# Patient Record
Sex: MALE | Race: WHITE
[De-identification: names, ages, dates, MRNs, and addresses within clinical notes are randomized per-mention and may not be internally consistent; named-entity substitution may affect disease eponyms.]

---

## 2018-10-30 ENCOUNTER — RECORD ABSTRACTING (OUTPATIENT)
Age: 68
End: 2018-10-30

## 2018-10-30 DIAGNOSIS — Z83.3 FAMILY HISTORY OF DIABETES MELLITUS: ICD-10-CM

## 2018-10-30 DIAGNOSIS — Z78.9 OTHER SPECIFIED HEALTH STATUS: ICD-10-CM

## 2018-10-30 DIAGNOSIS — Z87.19 PERSONAL HISTORY OF OTHER DISEASES OF THE DIGESTIVE SYSTEM: ICD-10-CM

## 2018-10-30 DIAGNOSIS — Z80.42 FAMILY HISTORY OF MALIGNANT NEOPLASM OF PROSTATE: ICD-10-CM

## 2018-10-30 DIAGNOSIS — Z82.3 FAMILY HISTORY OF STROKE: ICD-10-CM

## 2018-10-30 DIAGNOSIS — Z82.49 FAMILY HISTORY OF ISCHEMIC HEART DISEASE AND OTHER DISEASES OF THE CIRCULATORY SYSTEM: ICD-10-CM

## 2018-12-03 ENCOUNTER — RX RENEWAL (OUTPATIENT)
Age: 68
End: 2018-12-03

## 2018-12-03 ENCOUNTER — APPOINTMENT (OUTPATIENT)
Dept: INTERNAL MEDICINE | Facility: CLINIC | Age: 68
End: 2018-12-03
Payer: COMMERCIAL

## 2018-12-03 ENCOUNTER — APPOINTMENT (OUTPATIENT)
Dept: ENDOCRINOLOGY | Facility: CLINIC | Age: 68
End: 2018-12-03
Payer: COMMERCIAL

## 2018-12-03 VITALS
BODY MASS INDEX: 25.49 KG/M2 | DIASTOLIC BLOOD PRESSURE: 80 MMHG | HEIGHT: 61 IN | SYSTOLIC BLOOD PRESSURE: 140 MMHG | WEIGHT: 135 LBS | HEART RATE: 68 BPM

## 2018-12-03 VITALS
WEIGHT: 135 LBS | HEIGHT: 61 IN | SYSTOLIC BLOOD PRESSURE: 140 MMHG | BODY MASS INDEX: 25.49 KG/M2 | DIASTOLIC BLOOD PRESSURE: 80 MMHG

## 2018-12-03 DIAGNOSIS — Z00.00 ENCOUNTER FOR GENERAL ADULT MEDICAL EXAMINATION W/OUT ABNORMAL FINDINGS: ICD-10-CM

## 2018-12-03 DIAGNOSIS — Z86.39 PERSONAL HISTORY OF OTHER ENDOCRINE, NUTRITIONAL AND METABOLIC DISEASE: ICD-10-CM

## 2018-12-03 LAB
ALBUMIN SERPL ELPH-MCNC: 4.5 G/DL
ALP BLD-CCNC: 94 U/L
ALT SERPL-CCNC: 20 U/L
ANION GAP SERPL CALC-SCNC: 14 MMOL/L
AST SERPL-CCNC: 23 U/L
BILIRUB SERPL-MCNC: 0.3 MG/DL
BUN SERPL-MCNC: 22 MG/DL
CALCIUM SERPL-MCNC: 9.4 MG/DL
CHLORIDE SERPL-SCNC: 106 MMOL/L
CHOLEST SERPL-MCNC: 148 MG/DL
CHOLEST/HDLC SERPL: 3.7 RATIO
CO2 SERPL-SCNC: 26 MMOL/L
CREAT SERPL-MCNC: 0.89 MG/DL
GLUCOSE SERPL-MCNC: 117 MG/DL
HBA1C MFR BLD HPLC: 6 %
HDLC SERPL-MCNC: 40 MG/DL
LDLC SERPL CALC-MCNC: 88 MG/DL
POTASSIUM SERPL-SCNC: 4.5 MMOL/L
PROT SERPL-MCNC: 6.9 G/DL
SODIUM SERPL-SCNC: 146 MMOL/L
TRIGL SERPL-MCNC: 102 MG/DL

## 2018-12-03 PROCEDURE — G0009: CPT

## 2018-12-03 PROCEDURE — 36415 COLL VENOUS BLD VENIPUNCTURE: CPT

## 2018-12-03 PROCEDURE — 90670 PCV13 VACCINE IM: CPT

## 2018-12-03 PROCEDURE — 99213 OFFICE O/P EST LOW 20 MIN: CPT

## 2018-12-03 PROCEDURE — 99387 INIT PM E/M NEW PAT 65+ YRS: CPT | Mod: 25

## 2018-12-03 RX ORDER — PNV NO.95/FERROUS FUM/FOLIC AC 28MG-0.8MG
TABLET ORAL
Refills: 0 | Status: ACTIVE | COMMUNITY

## 2018-12-03 NOTE — HEALTH RISK ASSESSMENT
[Good] : ~his/her~  mood as  good [Intercurrent hospitalizations] : was admitted to the hospital  [No falls in past year] : Patient reported no falls in the past year [0] : 2) Feeling down, depressed, or hopeless: Not at all (0) [None] : None [With Family] : lives with family [Employed] : employed [] :  [Fully functional (bathing, dressing, toileting, transferring, walking, feeding)] : Fully functional (bathing, dressing, toileting, transferring, walking, feeding) [Fully functional (using the telephone, shopping, preparing meals, housekeeping, doing laundry, using] : Fully functional and needs no help or supervision to perform IADLs (using the telephone, shopping, preparing meals, housekeeping, doing laundry, using transportation, managing medications and managing finances) [] : No [de-identified] : rotator cuff repair [[id number]] : 0 [Change in mental status noted] : No change in mental status noted [Reports changes in hearing] : Reports no changes in hearing [Reports changes in vision] : Reports no changes in vision [Reports changes in dental health] : Reports no changes in dental health [ColonoscopyDate] : 03/15 [ColonoscopyComments] : Dr Laura [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

## 2018-12-03 NOTE — PHYSICAL EXAM
[Normal] : normal gait, coordination grossly intact, no focal deficits [de-identified] : Good pedal pulses [de-identified] : many moles and also psoriatic lesions on knees and left LE

## 2018-12-03 NOTE — HISTORY OF PRESENT ILLNESS
[FreeTextEntry1] : annual physical [de-identified] : Pt recently had a left rotator cuff repair by Dr Winslow on Sept 26th. He has been going to PT but still has a lot of pain. Pt says that now he has more pain than before the surgery, especially after PT. He has followed with ortho. Last visit was 1 week ago. \par Pt takes tylenol PRN for the pain. \par Also still with smallamount of urinary leakage after his prostate cancer treatment.

## 2018-12-03 NOTE — ASSESSMENT
[FreeTextEntry1] : B/p slightly high today. However pt has not taken his losartan as yet. He states that his b.p has been very well controlled overall.

## 2018-12-04 LAB — TSH SERPL-ACNC: 3.15 UIU/ML

## 2018-12-23 ENCOUNTER — RX RENEWAL (OUTPATIENT)
Age: 68
End: 2018-12-23

## 2019-03-25 ENCOUNTER — APPOINTMENT (OUTPATIENT)
Dept: UROLOGY | Facility: CLINIC | Age: 69
End: 2019-03-25
Payer: COMMERCIAL

## 2019-03-25 PROCEDURE — 36415 COLL VENOUS BLD VENIPUNCTURE: CPT

## 2019-04-01 LAB
PSA FREE FLD-MCNC: NORMAL %
PSA FREE SERPL-MCNC: <0.01 NG/ML
PSA SERPL-MCNC: <0.01 NG/ML

## 2019-04-17 ENCOUNTER — TRANSCRIPTION ENCOUNTER (OUTPATIENT)
Age: 69
End: 2019-04-17

## 2019-05-16 ENCOUNTER — TRANSCRIPTION ENCOUNTER (OUTPATIENT)
Age: 69
End: 2019-05-16

## 2019-05-28 ENCOUNTER — APPOINTMENT (OUTPATIENT)
Dept: UROLOGY | Facility: CLINIC | Age: 69
End: 2019-05-28
Payer: COMMERCIAL

## 2019-05-28 VITALS
WEIGHT: 144 LBS | HEART RATE: 77 BPM | DIASTOLIC BLOOD PRESSURE: 69 MMHG | BODY MASS INDEX: 27.19 KG/M2 | HEIGHT: 61 IN | SYSTOLIC BLOOD PRESSURE: 108 MMHG

## 2019-05-28 LAB
BILIRUB UR QL STRIP: NORMAL
CLARITY UR: CLEAR
COLLECTION METHOD: NORMAL
GLUCOSE UR-MCNC: NORMAL
HCG UR QL: 0.2 EU/DL
HGB UR QL STRIP.AUTO: NORMAL
KETONES UR-MCNC: NORMAL
LEUKOCYTE ESTERASE UR QL STRIP: NORMAL
NITRITE UR QL STRIP: NORMAL
PH UR STRIP: 5.5
PROT UR STRIP-MCNC: NORMAL
SP GR UR STRIP: 1.02

## 2019-05-28 PROCEDURE — 81000 URINALYSIS NONAUTO W/SCOPE: CPT

## 2019-05-28 PROCEDURE — 99214 OFFICE O/P EST MOD 30 MIN: CPT

## 2019-06-03 ENCOUNTER — APPOINTMENT (OUTPATIENT)
Dept: ENDOCRINOLOGY | Facility: CLINIC | Age: 69
End: 2019-06-03
Payer: COMMERCIAL

## 2019-06-03 VITALS
BODY MASS INDEX: 26.43 KG/M2 | HEIGHT: 61 IN | WEIGHT: 140 LBS | DIASTOLIC BLOOD PRESSURE: 70 MMHG | SYSTOLIC BLOOD PRESSURE: 100 MMHG | HEART RATE: 68 BPM

## 2019-06-03 PROCEDURE — 36415 COLL VENOUS BLD VENIPUNCTURE: CPT

## 2019-06-03 PROCEDURE — 99213 OFFICE O/P EST LOW 20 MIN: CPT | Mod: 25

## 2019-06-03 NOTE — PHYSICAL EXAM
[Alert] : alert [No Acute Distress] : no acute distress [Well Nourished] : well nourished [No Proptosis] : no proptosis [Well Developed] : well developed [Thyroid Not Enlarged] : the thyroid was not enlarged [Normal Outer Ear/Nose] : the ears and nose were normal in appearance [No Respiratory Distress] : no respiratory distress [Normal Rate] : heart rate was normal  [Normal Rate and Effort] : normal respiratory rhythm and effort [No Stigmata of Cushings Syndrome] : no stigmata of cushings syndrome [Oriented x3] : oriented to person, place, and time [Normal Insight/Judgement] : insight and judgment were intact [Normal Affect] : the affect was normal [Normal Mood] : the mood was normal

## 2019-06-03 NOTE — HISTORY OF PRESENT ILLNESS
[FreeTextEntry1] : Radha 3, 2019\par \par      .\par            PCP: Dr. Izzy Vital\par             Urol: Dr. Stacy Sotomayor\par             Eyes: Dr. wTan Suresh Jr (saw 3/2017 and again 1/2018)\par             GI: Dr. Oscar Laura- ? a few years ago - told of polyps \par             Derm:  Dr. Feli Husain\par            .\par            CC: Underactive thyroid (TPO and Tg Ab negative) Dx: ~12/2015  Dr. Driver\par                          3/26/18 U/S Thyroid - negative \par             (Prediabetes: 3/2016 A1c 6.4 12/12/2017 A1c 6.1, 3/2018: OGTT peak 180)\par             (B12 deficiency, B12 177)\par             (spinal stenosis - Dr. Post) \par             (3/2017 - radical prostatectomy)\par \par Lab tests from December 3, 2018 at [facility] included\par TSH 3.15\par HbA1c 6.0 %\par He reports that blood pressure has been running on the low side..\par \par His med list iincludes\par Losartan 50 mg daily\par Levothyroxine 25 mcg daily and\par Vitamin b12 1000 mcg daily.\par \par Impression: : doiing well\par TFTs today and ROV 6 months.   \par \par \par \par \par December 3, 2018\par \par            .\par            PCP: Dr. Izzy Vital\par             Urol: Dr. tSacy Sotomayor\par             Eyes: Dr. Demetrice Jr (saw 3/2017 and again 1/2018)\par             GI: Dr. Laura- ? a few years ago - told of polyps \par             Derm:  Dr. MELODIE Husain\par            .\par            CC: Underactive thyroid (TPO and Tg Ab negative)\par             3/26/18 U/S Thyroid - negative \par             (Prediabetes: 3/2016 A1c 6.4 12/12/2017 A1c 6.1, 3/2018: OGTT peak 180)\par             (B12 deficiency, B12 177)\par             (spinal stenosis - Dr. Post) \par             (3/2017 - radical prostatectomy)\par \par Has early hypothyroidism.  Initial Rx with levothyroxine did not go well:  he felt poorly and stopped.\par Now taking 25 mcg daily and tolerating it well.\par He was reluctant to take vitamin B12 for blood level of 177, but now takes it regularly.\par A1c as high as 6.4 % indicating prediabetes.\par .\par Imrpession:  He feels well and is doing well.\par Plan:  As arranged by Dr. Vital he will have A1c, TFTs checked today.\par ROV ~6 months.  \par \par \par Previous notes from [facility] appended below:\par \par \par  April 9, 2018\par            .\par            PCP: Dr. Izzy Vital\par             Urol: Dr. Stacy Sotomayor\par             Eyes: Dr. Demetrice Jr (saw 3/2017 and again 1/2018)\par             GI: Dr. Laura- ? a few years ago - told of polyps \par            .\par            CC: Underactive thyroid (TPO and Tg Ab negative)\par             3/26/18 U/S Thyroid - negative \par             (Prediabetes: 3/2016 A1c 6.4 12/12/2017 A1c 6.1, 3/2018: OGTT peak 180)\par             (B12 deficiency) \par             (spinal stenosis - Dr. Post) \par             (3/2017 - radical prostatectomy)\par            .\par            With some negotiating, patient agreed to take vitamin B12 1000 mcg daily and eventually agreed to take levothyroxine 25 mcg every other day. He is also on Losartan.\par            .\par            He recently saw Dr. Sotomayor and lab tests from 3/27/2018 include undetectable TSA.\par            Labs from 3/26/2018 show\par            TSH 3.62, down from 6.25 in December\par            December B12 level was 1119 \par            Ultrasound thryoid was very reassuring.\par            A1c had gone from 5.7 to 6.1 so he went for formal OGTT:\par            FBS wqs 105 and peak (2 hr) sugar was 180 - also consistant with\par            diagnosis of prediabetes.\par            .\par            Impression Early hypothyroidism well controlled on levothyroxine 25 mcg QOD. He is aware of diagnosis of prediabetes. As he is reluctant to do self blood glucose monitoring at this time, A1c once-twice a year and occasional fasting and random glucose levels should suffice for now\par            Encourage walking.\par            .\par            ROV here when he returns for CPX, likely in December. \par            .\par            .\par            ==\par            .\par            December 15, 2017\par            .\par            .\par            PCP: Dr. Izzy Vital\par             Urol: Dr. Stacy Sotomayor\par             Eyes: Dr. Demetrice Jr (saw 3/2017 and again 1/2018)\par             GI: Dr. Laura- ? a few years ago - told of polyps \par            .\par            CC: Underactive thyroid (TPO and Tg Ab negative)\par             (Prediabetes)\par             (B12 deficiency) \par             (spinal stenosis - Dr. Post) \par             (3/2017 - radical prostatectomy)\par            .\par            66 yo former [employer] employee.\par            .\par            He returns regarding early ("pre-") hypothyroidism and prediabetes.\par            He does not like to take medication. He has well documented B12 deficiency and it took a lot of convincing to get him to take po B12, but he does take it now.\par            He did try taking levothyroxine in the past, but did not like how it made him feel.\par            He knows he has prediabetes, but is not quite ready to start self blood glucose monitoring. \par            .\par            Lab tests (Rob) kindly provided by Dr. Vital from\par            12/12/2017 include\par            free T4 0.8\par            TSH 6.25 **\par            B12 1119\par            glucose 105 mg/dl (fasting)\par            A1c 6.1 % (5.7 in May)\par            .\par            Impression: Slowly stuttering into hypothyroidism.\par            He would also like ultrasound of thyroid. \par            .\par            Plan: He is willing to try 25 mcg levothyroxine. I will start him on it every other day. Ultrasound thyroid. \par            .\par            ROV in February. Will address the sugars then. Thank you. -\par            .\par            ==\par            .\par            May 8, 2017\par            .\par            PCP: Dr. Izzy Vital\par             Urol: Dr. Stacy Sotomayor\par            .\par            CC: Underactive thyroid. \par             (Prediabetes)\par             (B12 deficiency) \par             (spinal stenosis - Dr. Sejal) \par            .\par            Since last visit, underwent radical prostatectomy March 2017\par            Feels well.\par            .\par            November Thyroid and B12 levels in good range. \par            .\par            Plan: A1c, TSH.\par            ROV 8 months. \par            .\par            ==\par            .\par            November 21, 2016\par            .\par            PCP: Dr. Maik Driver\par            .\par            CC: Underactive thyroid. \par             (Prediabetes)\par             (B12 deficiency) \par             (spinal stenosis - Dr. Sejal) \par            .\par            On po B12, his B12 level went from 170's to 700's.\par            He stopped his thyroid pill.\par            His last A1c 6.4 % (pre-diabetes).\par            .\par            Plan: Check\par            B12\par            A1c\par            TSH\par            ROV 6-8 months.\par            .\par            .\par            ==\par            .\par            July 20, 2016\par            .\par            PCP: Dr. Maik Driver\par            .\par            CC: Underactive thyroid. \par             (Prediabetes)\par             (B12 deficiency) \par             (spinal stenosis - Dr. Sejal) \par            .\par            Because of L shoulder pain, recently saw Dr. Castro and received benefit from Voltanen 75 mg with Omeprazole \par            .\par            Labs in March included\par             mg/dl\par            TSH 2.65\par            B12 171 (before starting po B12) \par            HbA1c 6.4 % **\par            .\par            Impression: TSH is remaining within normal limits even though he chose not to take levothyroxine.\par            .\par            B12 is being treated. \par            .\par            A1c indicates pre-diabetes - counsedled today and in the future will consider monitoring glucose levels. \par            .\par            ==\par            .\par            May 19, 2016\par            .\par            PCP: Dr. Maik Driver\par            .\par            CC: Underactive thyroid. \par             (spinal stenosis - Dr. Sejal) \par            .\par            He did not like the thyroid medication, so he stopped it (!)\par            He is taking\par            Vitamin B12 1000 mcg daily and\par            Telmisartan (Micardis) 20 mg daily. \par            .\par            He went for lab tests (Rob)\par            3/17/2016\par            vitamin B12 171 *** (despite po B12 x 1 month)\par            TSH 2.65\par            .\par            .\par            Impression: "I feel pretty good...."\par            TSH has remained normal, even though he has stopped the\par            levothyroxine.\par            However, despite taking 1000 mcg of PO B12 for one month, the level is still low. \par            .\par            Plan: Advised him he may benefit from injectable B12\par            He will discuss with Dr. Driver. \par            .\par            ==\par            .\par            March 17, 2016\par            .\par            PCP: Dr. Maik Driver\par            .\par            CC: Underactive thyroid. \par             (spinal stenosis - Dr. Post) \par            .\par            Currently tolerating thyroid medication.\par            .\par            Plan: Continue to monitor TFTs on levothyroxine and monitor his symptoms. \par            .\par            ==\par            .\par            January 18, 2016\par            .\par            PCP: Dr. Maik Driver\par            .\par            CC: Underactive thyroid. \par             (spinal stenosis - Dr. Post) \par            .\par            Accompanied by his daughter, Sarah.\par            His wife, Osiris, visits here.\par            .\par            December he started on levothyroxine 50 mcg daily.\par            He noted sweating and dose changed to 75 mcg.\par            Took last levothyroxine a week ago and his leg and back pain and sweats went away. \par            He is on Telmisartan 20 mg daily for elevated blood pressure. \par            .\par            Impression: Reason for symptoms not clear.\par            .\par            Plan: Updated labs today and obtain old records (offices closed for [facility]). TOV 6-8 weeks.

## 2019-06-03 NOTE — ASSESSMENT
[FreeTextEntry1] : Hypothyroidism well controlled.\par He is keeping A1c down and will verify again today.\par ROV 6 months

## 2019-06-04 ENCOUNTER — APPOINTMENT (OUTPATIENT)
Dept: INTERNAL MEDICINE | Facility: CLINIC | Age: 69
End: 2019-06-04
Payer: COMMERCIAL

## 2019-06-04 VITALS
BODY MASS INDEX: 25.86 KG/M2 | DIASTOLIC BLOOD PRESSURE: 62 MMHG | HEIGHT: 61 IN | SYSTOLIC BLOOD PRESSURE: 124 MMHG | WEIGHT: 137 LBS

## 2019-06-04 PROCEDURE — 99213 OFFICE O/P EST LOW 20 MIN: CPT

## 2019-06-04 RX ORDER — LOSARTAN POTASSIUM 50 MG/1
50 TABLET, FILM COATED ORAL
Qty: 90 | Refills: 3 | Status: DISCONTINUED | COMMUNITY
Start: 2018-12-03 | End: 2019-06-04

## 2019-06-04 NOTE — HEALTH RISK ASSESSMENT
[No falls in past year] : Patient reported no falls in the past year [0] : 2) Feeling down, depressed, or hopeless: Not at all (0) [] : No [de-identified] : walking [de-identified] : none

## 2019-06-04 NOTE — HISTORY OF PRESENT ILLNESS
[FreeTextEntry1] : dizziness\par hypotension  [de-identified] : Pt here for visit as he has had a couple of lower b/p readings at other physician's offices. He is mostly asymptomatic.  He states that twice after bending down and getting back up he was dizzy for a few seconds. Otherwise no lightheadedness, no palpitations, etc

## 2019-06-04 NOTE — PHYSICAL EXAM
[No Acute Distress] : no acute distress [Well Nourished] : well nourished [Well Developed] : well developed [Well-Appearing] : well-appearing [Clear to Auscultation] : lungs were clear to auscultation bilaterally [No Respiratory Distress] : no respiratory distress  [No Accessory Muscle Use] : no accessory muscle use [Normal Rate] : normal rate  [Regular Rhythm] : with a regular rhythm [Normal S1, S2] : normal S1 and S2

## 2019-06-19 LAB
BACTERIA: 0
CASTS: 0
CRYSTALS: NORMAL
MUCUS: 0
RBC CASTS # UR COMP ASSIST: NORMAL
WBC: 0

## 2019-06-19 NOTE — HISTORY OF PRESENT ILLNESS
[FreeTextEntry1] :  (Last seen 09/24/18)\par        .\par        Carlin Schultz is a 69-year-old gentleman now just over 2 years S/P bilateral pelvic lymph node dissection and radical retropubic prostatectomy performed on March 22, 2017. Final pathology: Rubia's (4+3) 7 adenocarcinoma; margins negative; lymph nodes negative. Surgical cure implied. \par         Mr Schultz returns to the office today for routine surveillance. He reports he was recently found to have a small basal cell cancer on his back (completely treated).\par        .\par        PERTINENT UROLOGIC HISTORY:\par        .\par        02/09/17: PSA 8.06; free PSA 11%\par        02/09/17 PELVIC ULTRASONOGRAPHY: Prostatic volume 43.11 cc; PPSA 5.17\par        02/16/17: TRUS; bilateral hypoechogenicity\par        02/22/17: PROSTATE BIOPSY 3/4 biopsies positive on the right; 3/4 biopsies positive on the left; large volume disease; highest Rubia score (4+4) 8\par        03/22/17: RADICAL PROSTATECTOMY; RUBIA'S (4+3) 7 (CURE)\par        06/26/17: PSA <0.01\par        12/29/17: PSA <0.01\par        03/27/18: PSA <0.01\par \par 03/25/19: PSA <0.01\par \par CURRENT UROLOGIC REVIEW OF SYSTEMS:\par \par Incontinence Assessed?.  YES\par \par Nocturia:  (-) 1  x/night\par Frequency: (+)  8   x/day    \par Dysuria: (-)\par Urgency:  (-) \par Hesitancy:  (-)\par Intermittency:  (-)\par Sensation of Incomplete Voiding :  (-)\par Double voiding :  (-)\par Stress incontinence:  (+) MNOR, NOT DOING KEGELS\par Urge incontinence:  (+) OCC. DUE TO "HOLDING TOO LONG"\par Use of absorbent pads:  (+) WEARS ONE THIN LINER DAILY; "STAINED"\par Terminal dribbling:  (-)\par Status of stream: "GOOD"\par Venereal disease:  (-)\par Kidney stones:  (-)\par UTIs:  (-)\par Prior urologic surgery:  (+) SEE HPI\par Hematuria:  (-)\par Abdominal pressure:  (-)\par Back pain:  (-) CHRONIC NON  BACK PAIN\par Sexual Status: not active\par Gout:  (-)\par Renal problems:  (-)\par Family History of Urologic Problems:  (-)\par International Prostate Symptom Score (IPSS): 3  (Mild 0-7)\par Satisfaction Index: 2 (mostly satisfied)\par \par \par \par

## 2019-06-19 NOTE — REVIEW OF SYSTEMS
[see HPI] : see HPI [Joint Pain] : joint pain [Erectile Dysfunction] : erectile dysfunction [Fever] : no fever [Feeling Poorly] : not feeling poorly [Chills] : no chills [Feeling Tired] : not feeling tired [Recent Weight Gain (___ Lbs)] : no recent weight gain [Eyesight Problems] : no eyesight problems [Recent Weight Loss (___ Lbs)] : no recent weight loss [Loss Of Hearing] : no hearing loss [Nosebleeds] : no nosebleeds [Palpitations] : no palpitations [Chest Pain] : no chest pain [Shortness Of Breath] : no shortness of breath [Lower Ext Edema] : no extremity edema [Leg Claudication] : no intermittent leg claudication [Orthopnea] : no orthopnea [Cough] : no cough [SOB on Exertion] : no shortness of breath during exertion [Wheezing] : no wheezing [Abdominal Pain] : no abdominal pain [PND] : no PND [Constipation] : no constipation [Vomiting] : no vomiting [Heartburn] : no heartburn [Diarrhea] : no diarrhea [Melena] : no melena [Skin Lesions] : no skin lesions [An Unusual Growth] : no unusual growth on the skin [Suicidal] : not suicidal [Sleep Disturbances] : no sleep disturbances [Anxiety] : no anxiety [Depression] : no depression [Hot Flashes] : no hot flashes [Muscle Weakness] : no muscle weakness [Easy Bleeding] : no tendency for easy bleeding [Easy Bruising] : no tendency for easy bruising

## 2019-06-19 NOTE — PHYSICAL EXAM
[General Appearance - Well Nourished] : well nourished [Normal Appearance] : normal appearance [Well Groomed] : well groomed [General Appearance - In No Acute Distress] : no acute distress [Bowel Sounds] : normal bowel sounds [Abdomen Soft] : soft [Abdomen Tenderness] : non-tender [Abdomen Mass (___ Cm)] : no abdominal mass palpated [Abdomen Hernia] : no hernia was discovered [Costovertebral Angle Tenderness] : no ~M costovertebral angle tenderness [Absent] : was absent [Rectal Tenderness] : rectal tenderness [Edema] : no peripheral edema [] : no respiratory distress [Respiration, Rhythm And Depth] : normal respiratory rhythm and effort [Exaggerated Use Of Accessory Muscles For Inspiration] : no accessory muscle use [Oriented To Time, Place, And Person] : oriented to person, place, and time [Affect] : the affect was normal [Mood] : the mood was normal [Not Anxious] : not anxious [Normal Station and Gait] : the gait and station were normal for the patient's age [No Focal Deficits] : no focal deficits [No Palpable Adenopathy] : no palpable adenopathy [No Lesions] : no lesions [Normal] : normal [Testes] : normal [5th Left ICS - MCL] : palpated at the 5th LICS in the midclavicular line [Normal Rate] : normal [Normal S2] : normal S2 [Normal S1] : normal S1 [No Murmur] : no murmurs heard [No Pitting Edema] : no pitting edema present [Mass___cm] : no rectal masses [Occult Blood Positive] : exam was negative for occult blood [Adherent Prepuce] : no adherence of the prepuce [Phimosis] : no phimosis [Paraphimosis] : no paraphimosis [Discharge] : no discharge [Balanitis] : no balanitis [Circumcised] : the penis was uncircumcised [S3] : no S3 [S4] : no S4

## 2019-06-19 NOTE — ASSESSMENT
[FreeTextEntry1] : Carlin Schultz is a 70 yo man now just over 2 years S/P BPLND and RRP.  PSA remains unmeasurable.  Physical exam today was benign.  Minimal incontinence persists.  As in the past the patient has been encouraged to do Kegel exercises.  Follow up in 6 months is planned.

## 2019-06-24 LAB
ALBUMIN SERPL ELPH-MCNC: 4.4 G/DL
ALP BLD-CCNC: 94 U/L
ALT SERPL-CCNC: 23 U/L
ANION GAP SERPL CALC-SCNC: 16 MMOL/L
AST SERPL-CCNC: 28 U/L
BASOPHILS # BLD AUTO: 0.05 K/UL
BASOPHILS NFR BLD AUTO: 0.8 %
BILIRUB DIRECT SERPL-MCNC: 0.1 MG/DL
BILIRUB INDIRECT SERPL-MCNC: 0.2 MG/DL
BILIRUB SERPL-MCNC: 0.4 MG/DL
BUN SERPL-MCNC: 21 MG/DL
CALCIUM SERPL-MCNC: 9.6 MG/DL
CHLORIDE SERPL-SCNC: 109 MMOL/L
CO2 SERPL-SCNC: 21 MMOL/L
CORTIS SERPL-MCNC: 12.7 UG/DL
CREAT SERPL-MCNC: 0.87 MG/DL
EOSINOPHIL # BLD AUTO: 0.31 K/UL
EOSINOPHIL NFR BLD AUTO: 5.2 %
ESTIMATED AVERAGE GLUCOSE: 131 MG/DL
FRUCTOSAMINE SERPL-MCNC: 245 UMOL/L
GLUCOSE SERPL-MCNC: 116 MG/DL
HBA1C MFR BLD HPLC: 6.2 %
HCT VFR BLD CALC: 45.4 %
HGB BLD-MCNC: 13.9 G/DL
IMM GRANULOCYTES NFR BLD AUTO: 0.2 %
LYMPHOCYTES # BLD AUTO: 2.42 K/UL
LYMPHOCYTES NFR BLD AUTO: 40.3 %
MAN DIFF?: NORMAL
MCHC RBC-ENTMCNC: 30.6 GM/DL
MCHC RBC-ENTMCNC: 31 PG
MCV RBC AUTO: 101.3 FL
MONOCYTES # BLD AUTO: 0.46 K/UL
MONOCYTES NFR BLD AUTO: 7.7 %
NEUTROPHILS # BLD AUTO: 2.75 K/UL
NEUTROPHILS NFR BLD AUTO: 45.8 %
PLATELET # BLD AUTO: 192 K/UL
POTASSIUM SERPL-SCNC: 4.4 MMOL/L
PROT SERPL-MCNC: 6.9 G/DL
RBC # BLD: 4.48 M/UL
RBC # FLD: 14.3 %
SODIUM SERPL-SCNC: 146 MMOL/L
T3 SERPL-MCNC: 101 NG/DL
T4 SERPL-MCNC: 5.8 UG/DL
TSH SERPL-ACNC: 2.84 UIU/ML
URATE SERPL-MCNC: 5.2 MG/DL
WBC # FLD AUTO: 6 K/UL

## 2019-07-03 ENCOUNTER — APPOINTMENT (OUTPATIENT)
Dept: INTERNAL MEDICINE | Facility: CLINIC | Age: 69
End: 2019-07-03
Payer: MEDICARE

## 2019-07-03 VITALS
BODY MASS INDEX: 26.06 KG/M2 | WEIGHT: 138 LBS | SYSTOLIC BLOOD PRESSURE: 122 MMHG | HEIGHT: 61 IN | DIASTOLIC BLOOD PRESSURE: 68 MMHG

## 2019-07-03 PROCEDURE — 99213 OFFICE O/P EST LOW 20 MIN: CPT

## 2019-07-03 NOTE — PHYSICAL EXAM
[No Acute Distress] : no acute distress [Well Nourished] : well nourished [Well Developed] : well developed [Well-Appearing] : well-appearing [Normal] : normal rate, regular rhythm, normal S1 and S2 and no murmur heard

## 2019-07-03 NOTE — HEALTH RISK ASSESSMENT
[No] : In the past 12 months have you used drugs other than those required for medical reasons? No [No falls in past year] : Patient reported no falls in the past year [0] : 2) Feeling down, depressed, or hopeless: Not at all (0) [] : No [de-identified] : none [de-identified] : none

## 2019-10-17 ENCOUNTER — APPOINTMENT (OUTPATIENT)
Dept: INTERNAL MEDICINE | Facility: CLINIC | Age: 69
End: 2019-10-17
Payer: MEDICARE

## 2019-10-17 VITALS
SYSTOLIC BLOOD PRESSURE: 122 MMHG | WEIGHT: 133 LBS | BODY MASS INDEX: 25.11 KG/M2 | HEIGHT: 61 IN | DIASTOLIC BLOOD PRESSURE: 60 MMHG

## 2019-10-17 PROCEDURE — 99213 OFFICE O/P EST LOW 20 MIN: CPT

## 2019-10-17 NOTE — HEALTH RISK ASSESSMENT
[No] : In the past 12 months have you used drugs other than those required for medical reasons? No [No falls in past year] : Patient reported no falls in the past year [0] : 2) Feeling down, depressed, or hopeless: Not at all (0) [Patient reported colonoscopy was normal] : Patient reported colonoscopy was normal [None] : None [With Family] : lives with family [Employed] : employed [Fully functional (bathing, dressing, toileting, transferring, walking, feeding)] : Fully functional (bathing, dressing, toileting, transferring, walking, feeding) [] :  [Fully functional (using the telephone, shopping, preparing meals, housekeeping, doing laundry, using] : Fully functional and needs no help or supervision to perform IADLs (using the telephone, shopping, preparing meals, housekeeping, doing laundry, using transportation, managing medications and managing finances) [Reports changes in hearing] : Reports no changes in hearing [Change in mental status noted] : No change in mental status noted [ColonoscopyDate] : 03/15 [Reports changes in vision] : Reports no changes in vision [Reports changes in dental health] : Reports no changes in dental health [ColonoscopyComments] : Dr Laura [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

## 2019-10-17 NOTE — HISTORY OF PRESENT ILLNESS
[FreeTextEntry1] : follow-up blood pressure  [de-identified] : Pt here for f/u b/p. He feels well overall. No complaints.

## 2019-10-17 NOTE — PHYSICAL EXAM
[Normal] : normal rate, regular rhythm, normal S1 and S2 and no murmur heard [Normal Affect] : the affect was normal [Normal Insight/Judgement] : insight and judgment were intact

## 2019-11-15 ENCOUNTER — APPOINTMENT (OUTPATIENT)
Dept: INTERNAL MEDICINE | Facility: CLINIC | Age: 69
End: 2019-11-15
Payer: MEDICARE

## 2019-11-15 PROCEDURE — G0008: CPT

## 2019-11-15 PROCEDURE — 90662 IIV NO PRSV INCREASED AG IM: CPT

## 2019-11-17 ENCOUNTER — INBOUND DOCUMENT (OUTPATIENT)
Age: 69
End: 2019-11-17

## 2019-11-29 ENCOUNTER — APPOINTMENT (OUTPATIENT)
Dept: UROLOGY | Facility: CLINIC | Age: 69
End: 2019-11-29

## 2019-12-20 ENCOUNTER — NON-APPOINTMENT (OUTPATIENT)
Age: 69
End: 2019-12-20

## 2019-12-20 ENCOUNTER — APPOINTMENT (OUTPATIENT)
Dept: INTERNAL MEDICINE | Facility: CLINIC | Age: 69
End: 2019-12-20
Payer: COMMERCIAL

## 2019-12-20 ENCOUNTER — APPOINTMENT (OUTPATIENT)
Dept: ENDOCRINOLOGY | Facility: CLINIC | Age: 69
End: 2019-12-20
Payer: MEDICARE

## 2019-12-20 VITALS
WEIGHT: 129 LBS | HEART RATE: 68 BPM | BODY MASS INDEX: 24.35 KG/M2 | DIASTOLIC BLOOD PRESSURE: 70 MMHG | SYSTOLIC BLOOD PRESSURE: 120 MMHG | HEIGHT: 61 IN

## 2019-12-20 VITALS
SYSTOLIC BLOOD PRESSURE: 120 MMHG | HEIGHT: 61 IN | BODY MASS INDEX: 24.35 KG/M2 | WEIGHT: 129 LBS | DIASTOLIC BLOOD PRESSURE: 70 MMHG

## 2019-12-20 DIAGNOSIS — M75.102 UNSPECIFIED ROTATOR CUFF TEAR OR RUPTURE OF LEFT SHOULDER, NOT SPECIFIED AS TRAUMATIC: ICD-10-CM

## 2019-12-20 PROCEDURE — 99214 OFFICE O/P EST MOD 30 MIN: CPT

## 2019-12-20 PROCEDURE — 36415 COLL VENOUS BLD VENIPUNCTURE: CPT

## 2019-12-20 PROCEDURE — 93000 ELECTROCARDIOGRAM COMPLETE: CPT

## 2019-12-20 PROCEDURE — G0439: CPT

## 2019-12-20 NOTE — PHYSICAL EXAM
[Well Nourished] : well nourished [No Acute Distress] : no acute distress [Well Developed] : well developed [Well-Appearing] : well-appearing [Normal Sclera/Conjunctiva] : normal sclera/conjunctiva [PERRL] : pupils equal round and reactive to light [EOMI] : extraocular movements intact [Normal Outer Ear/Nose] : the outer ears and nose were normal in appearance [Normal Oropharynx] : the oropharynx was normal [Normal TMs] : both tympanic membranes were normal [No JVD] : no jugular venous distention [No Lymphadenopathy] : no lymphadenopathy [Thyroid Normal, No Nodules] : the thyroid was normal and there were no nodules present [Supple] : supple [No Respiratory Distress] : no respiratory distress  [No Accessory Muscle Use] : no accessory muscle use [Clear to Auscultation] : lungs were clear to auscultation bilaterally [Normal Rate] : normal rate  [Regular Rhythm] : with a regular rhythm [Normal S1, S2] : normal S1 and S2 [No Murmur] : no murmur heard [No Carotid Bruits] : no carotid bruits [Pedal Pulses Present] : the pedal pulses are present [No Edema] : there was no peripheral edema [No Palpable Aorta] : no palpable aorta [No Extremity Clubbing/Cyanosis] : no extremity clubbing/cyanosis [Soft] : abdomen soft [Non Tender] : non-tender [Non-distended] : non-distended [No Masses] : no abdominal mass palpated [No HSM] : no HSM [Normal Bowel Sounds] : normal bowel sounds [Normal Posterior Cervical Nodes] : no posterior cervical lymphadenopathy [Normal Anterior Cervical Nodes] : no anterior cervical lymphadenopathy [No CVA Tenderness] : no CVA  tenderness [No Spinal Tenderness] : no spinal tenderness [No Joint Swelling] : no joint swelling [Grossly Normal Strength/Tone] : grossly normal strength/tone [No Rash] : no rash [Coordination Grossly Intact] : coordination grossly intact [No Focal Deficits] : no focal deficits [Normal Gait] : normal gait [Normal Affect] : the affect was normal [Alert and Oriented x3] : oriented to person, place, and time [Normal Mood] : the mood was normal [Normal Insight/Judgement] : insight and judgment were intact

## 2019-12-20 NOTE — COUNSELING
[Fall prevention counseling provided] : Fall prevention counseling provided [FreeTextEntry2] : Pt counseled on proper diet and exercise. We discussed the importance of exercise in maintaining a healthy life style.\par

## 2019-12-20 NOTE — HISTORY OF PRESENT ILLNESS
[PMH Reviewed and Updated] : past medical history reviewed and updated [PSH Reviewed and Updated] : past surgical history reviewed and updated [Family History Reviewed and Updated] : family history reviewed and updated [Medication and Allergies Reconciled] : medication and allergies reconciled [0] : 0 [None] : The patient has no concerns about alcohol abuse [Retired] : retired from work [Compliant with medications] : compliant with medications [Spouse] : spouse [Walking] : walking [Extended Family] : extended family [Children] : children [Fully Independent] : fully independent [No history of falls] : no history of falls [Drives without concerns] : drives without concerns [Seatbelts] : seatbelts [Safe Driving Habits] : safe driving habits [Smoke Detectors] : smoke detectors [Hot Water Temp <120F] : hot water temp <120F [Sunscreen] : sunscreen [Patient Has Full Capacity] : this patient has full decision making capacity for discussion of advance care planning [Patient Has Designated Health Care Proxy/Advanced Directive] : patient has designated Health Care Proxy/Advanced Directive [Patient Has Documented Advanced Directive/Health Proxy but did not bring paperwork to Office Visit] : Patient has documented Advanced Directive/Health Proxy but did not bring paperwork to office visit [de-identified] : Pt here for annual physical. He feels well overall. No complaints at present. He walks almost every day for 1 hour and even goes up steep hill without any chest pains or SOB. [Over the Past 2 Weeks, Have You Felt Down, Depressed, or Hopeless?] : 1.) Over the past 2 weeks, have you felt down, depressed, or hopeless? No [Over the Past 2 Weeks, Have You Felt Little Interest or Pleasure Doing Things?] : 2.) Over the past 2 weeks, have you felt little interest or pleasure doing things? No [Bicycle Helmet] : not using bicycle helmet [Motorcycle Helmet] : not using motorcycle helmet [Carbon Monoxide Detector] : not using carbon monoxide detector [Bathroom Grab Bars] : not using bathroom grab bars [Fall Prevention Measures] : not using fall prevention measures [Gun Safe] : not using a gun safe [Gun Trigger Locks] : not using gun trigger locks [CPR Training for Household] : did not receive CPR training for patient [CPR Training for Patient] : did not receive CPR training for patient [FreeTextEntry2] : none [de-identified] : none [de-identified] : none [FreeTextEntry1] : none

## 2019-12-20 NOTE — HEALTH RISK ASSESSMENT
[Intercurrent hospitalizations] : was admitted to the hospital  [No falls in past year] : Patient reported no falls in the past year [0] : 1) Little interest or pleasure doing things: Not at all (0) [Patient reported colonoscopy was normal] : Patient reported colonoscopy was normal [Hepatitis C test declined] : Hepatitis C test declined [HIV test declined] : HIV test declined [With Family] : lives with family [None] : None [] :  [Fully functional (bathing, dressing, toileting, transferring, walking, feeding)] : Fully functional (bathing, dressing, toileting, transferring, walking, feeding) [Fully functional (using the telephone, shopping, preparing meals, housekeeping, doing laundry, using] : Fully functional and needs no help or supervision to perform IADLs (using the telephone, shopping, preparing meals, housekeeping, doing laundry, using transportation, managing medications and managing finances) [No] : No [Retired] : retired [de-identified] : rotator cuff repair [] : No [de-identified] : walks briskly [de-identified] : good [[id number]] : 0 [Change in mental status noted] : No change in mental status noted [Reports changes in hearing] : Reports no changes in hearing [Reports changes in vision] : Reports no changes in vision [Reports changes in dental health] : Reports no changes in dental health [ColonoscopyDate] : 03/15 [ColonoscopyComments] : Dr Laura [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

## 2019-12-22 NOTE — HISTORY OF PRESENT ILLNESS
[FreeTextEntry1] : Dec 20, 2019\par \par   PCP: Dr. Izzy Vital\par             Urol: Dr. Hurd p  in [place] \par             Eyes: Dr. Twan Suresh Jr (saw 3/2017 and again 1/2018)\par             GI: Dr. Oscar Laura- ? a few years ago - told of polyps \par             Derm:  Dr. Feli Husain\par            .\par            CC: Underactive thyroid (TPO and Tg Ab negative) Dx: ~12/2015  Dr. Driver\par                          3/26/18 U/S Thyroid - negative \par             (Prediabetes: 3/2016 A1c 6.4 12/12/2017 A1c 6.1, 3/2018: OGTT peak 180)\par             (B12 deficiency, B12 177, MCV elevated) \par             (spinal stenosis - Dr. Post) \par             (3/2017 - radical prostatectomy)\par \par 70 yo visits for hypothyroidism, prediabetes.  Saw Dr. Vital today.\par On B12 for B12 deficiency.\par Levothyroxine 25 mcg for very early hypothyroidism.  \par \par Lab test in June 2019 iincluded\par TSH 2.84 (on 25 mcg of LT4)\par A1c 6.2 %\par \par \par Impression:  Doing well.\par Plan:  Review results of updated thyroid function tests, A1c, B12, folate.\par ROV several months.  \par \par \par \par \par \par \par Radha 3, 2019\par \par      .\par            PCP: Dr. Izzy Vital\par             Urol: Dr. Stacy Sotomayor\par             Eyes: Dr. Twan Suresh Jr (saw 3/2017 and again 1/2018)\par             GI: Dr. Oscar Laura- ? a few years ago - told of polyps \par             Derm:  Dr. Feli Husain\par            .\par            CC: Underactive thyroid (TPO and Tg Ab negative) Dx: ~12/2015  Dr. Drievr\par                          3/26/18 U/S Thyroid - negative \par             (Prediabetes: 3/2016 A1c 6.4 12/12/2017 A1c 6.1, 3/2018: OGTT peak 180)\par             (B12 deficiency, B12 177)\par             (spinal stenosis - Dr. Post) \par             (3/2017 - radical prostatectomy)\par \par Lab tests from December 3, 2018 at [facility] included\par TSH 3.15\par HbA1c 6.0 %\par He reports that blood pressure has been running on the low side..\par \par His med list iincludes\par Losartan 50 mg daily\par Levothyroxine 25 mcg daily and\par Vitamin b12 1000 mcg daily.\par \par Impression: : doiing well\par TFTs today and ROV 6 months.   \par \par \par \par \par December 3, 2018\par \par            .\par            PCP: Dr. Izzy Vital\par             Urol: Dr. Stacy Sotomayor\par             Eyes: Dr. Demetrice Jr (saw 3/2017 and again 1/2018)\par             GI: Dr. Laura- ? a few years ago - told of polyps \par             Derm:  Dr. MELODIE Husain\par            .\par            CC: Underactive thyroid (TPO and Tg Ab negative)\par             3/26/18 U/S Thyroid - negative \par             (Prediabetes: 3/2016 A1c 6.4 12/12/2017 A1c 6.1, 3/2018: OGTT peak 180)\par             (B12 deficiency, B12 177)\par             (spinal stenosis - Dr. Post) \par             (3/2017 - radical prostatectomy)\par \par Has early hypothyroidism.  Initial Rx with levothyroxine did not go well:  he felt poorly and stopped.\par Now taking 25 mcg daily and tolerating it well.\par He was reluctant to take vitamin B12 for blood level of 177, but now takes it regularly.\par A1c as high as 6.4 % indicating prediabetes.\par .\par Imrpession:  He feels well and is doing well.\par Plan:  As arranged by Dr. Vital he will have A1c, TFTs checked today.\par ROV ~6 months.  \par \par \par Previous notes from [facility] appended below:\par \par \par  April 9, 2018\par            .\par            PCP: Dr. Izzy Vital\par             Urol: Dr. Stacy Sotomayor\par             Eyes: Dr. Demetrice Jr (saw 3/2017 and again 1/2018)\par             GI: Dr. Laura- ? a few years ago - told of polyps \par            .\par            CC: Underactive thyroid (TPO and Tg Ab negative)\par             3/26/18 U/S Thyroid - negative \par             (Prediabetes: 3/2016 A1c 6.4 12/12/2017 A1c 6.1, 3/2018: OGTT peak 180)\par             (B12 deficiency) \par             (spinal stenosis - Dr. Post) \par             (3/2017 - radical prostatectomy)\par            .\par            With some negotiating, patient agreed to take vitamin B12 1000 mcg daily and eventually agreed to take levothyroxine 25 mcg every other day. He is also on Losartan.\par            .\par            He recently saw Dr. Sotomayor and lab tests from 3/27/2018 include undetectable TSA.\par            Labs from 3/26/2018 show\par            TSH 3.62, down from 6.25 in December\par            December B12 level was 1119 \par            Ultrasound thryoid was very reassuring.\par            A1c had gone from 5.7 to 6.1 so he went for formal OGTT:\par            FBS wqs 105 and peak (2 hr) sugar was 180 - also consistant with\par            diagnosis of prediabetes.\par            .\par            Impression Early hypothyroidism well controlled on levothyroxine 25 mcg QOD. He is aware of diagnosis of prediabetes. As he is reluctant to do self blood glucose monitoring at this time, A1c once-twice a year and occasional fasting and random glucose levels should suffice for now\par            Encourage walking.\par            .\par            ROV here when he returns for CPX, likely in December. \par            .\par            .\par            ==\par            .\par            December 15, 2017\par            .\par            .\par            PCP: Dr. Izzy Vital\par             Urol: Dr. Stacy Sotomayor\par             Eyes: Dr. Demetrice Jr (saw 3/2017 and again 1/2018)\par             GI: Dr. Laura- ? a few years ago - told of polyps \par            .\par            CC: Underactive thyroid (TPO and Tg Ab negative)\par             (Prediabetes)\par             (B12 deficiency) \par             (spinal stenosis - Dr. Post) \par             (3/2017 - radical prostatectomy)\par            .\par            68 yo former [employer] employee.\par            .\par            He returns regarding early ("pre-") hypothyroidism and prediabetes.\par            He does not like to take medication. He has well documented B12 deficiency and it took a lot of convincing to get him to take po B12, but he does take it now.\par            He did try taking levothyroxine in the past, but did not like how it made him feel.\par            He knows he has prediabetes, but is not quite ready to start self blood glucose monitoring. \par            .\par            Lab tests (Rob) kindly provided by Dr. Vital from\par            12/12/2017 include\par            free T4 0.8\par            TSH 6.25 **\par            B12 1119\par            glucose 105 mg/dl (fasting)\par            A1c 6.1 % (5.7 in May)\par            .\par            Impression: Slowly stuttering into hypothyroidism.\par            He would also like ultrasound of thyroid. \par            .\par            Plan: He is willing to try 25 mcg levothyroxine. I will start him on it every other day. Ultrasound thyroid. \par            .\par            ROV in February. Will address the sugars then. Thank you. -\par            .\par            ==\par            .\par            May 8, 2017\par            .\par            PCP: Dr. Izzy Vital\par             Urol: Dr. Stacy Sotomayor\par            .\par            CC: Underactive thyroid. \par             (Prediabetes)\par             (B12 deficiency) \par             (spinal stenosis - Dr. Post) \par            .\par            Since last visit, underwent radical prostatectomy March 2017\par            Feels well.\par            .\par            November Thyroid and B12 levels in good range. \par            .\par            Plan: A1c, TSH.\par            ROV 8 months. \par            .\par            ==\par            .\par            November 21, 2016\par            .\par            PCP: Dr. Maik Driver\par            .\par            CC: Underactive thyroid. \par             (Prediabetes)\par             (B12 deficiency) \par             (spinal stenosis - Dr. Post) \par            .\par            On po B12, his B12 level went from 170's to 700's.\par            He stopped his thyroid pill.\par            His last A1c 6.4 % (pre-diabetes).\par            .\par            Plan: Check\par            B12\par            A1c\par            TSH\par            ROV 6-8 months.\par            .\par            .\par            ==\par            .\par            July 20, 2016\par            .\par            PCP: Dr. Maik Driver\par            .\par            CC: Underactive thyroid. \par             (Prediabetes)\par             (B12 deficiency) \par             (spinal stenosis - Dr. Post) \par            .\par            Because of L shoulder pain, recently saw Dr. Castro and received benefit from Voltanen 75 mg with Omeprazole \par            .\par            Labs in March included\par             mg/dl\par            TSH 2.65\par            B12 171 (before starting po B12) \par            HbA1c 6.4 % **\par            .\par            Impression: TSH is remaining within normal limits even though he chose not to take levothyroxine.\par            .\par            B12 is being treated. \par            .\par            A1c indicates pre-diabetes - counsedled today and in the future will consider monitoring glucose levels. \par            .\par            ==\par            .\par            May 19, 2016\par            .\par            PCP: Dr. Maik Driver\par            .\par            CC: Underactive thyroid. \par             (spinal stenosis - Dr. Post) \par            .\par            He did not like the thyroid medication, so he stopped it (!)\par            He is taking\par            Vitamin B12 1000 mcg daily and\par            Telmisartan (Micardis) 20 mg daily. \par            .\par            He went for lab tests (Rob)\par            3/17/2016\par            vitamin B12 171 *** (despite po B12 x 1 month)\par            TSH 2.65\par            .\par            .\par            Impression: "I feel pretty good...."\par            TSH has remained normal, even though he has stopped the\par            levothyroxine.\par            However, despite taking 1000 mcg of PO B12 for one month, the level is still low. \par            .\par            Plan: Advised him he may benefit from injectable B12\par            He will discuss with Dr. Driver. \par            .\par            ==\par            .\par            March 17, 2016\par            .\par            PCP: Dr. Maik Driver\par            .\par            CC: Underactive thyroid. \par             (spinal stenosis - Dr. Post) \par            .\par            Currently tolerating thyroid medication.\par            .\par            Plan: Continue to monitor TFTs on levothyroxine and monitor his symptoms. \par            .\par            ==\par            .\par            January 18, 2016\par            .\par            PCP: Dr. Maik Driver\par            .\par            CC: Underactive thyroid. \par             (spinal stenosis - Dr. Post) \par            .\par            Accompanied by his daughter, Sarah.\par            His wife, Osiris, visits here.\par            .\par            December he started on levothyroxine 50 mcg daily.\par            He noted sweating and dose changed to 75 mcg.\par            Took last levothyroxine a week ago and his leg and back pain and sweats went away. \par            He is on Telmisartan 20 mg daily for elevated blood pressure. \par            .\par            Impression: Reason for symptoms not clear.\par            .\par            Plan: Updated labs today and obtain old records (offices closed for [facility]). TOV 6-8 weeks.

## 2019-12-22 NOTE — PHYSICAL EXAM
[Well Nourished] : well nourished [Alert] : alert [No Acute Distress] : no acute distress [Well Developed] : well developed [Normal Outer Ear/Nose] : the ears and nose were normal in appearance [No Proptosis] : no proptosis [No Respiratory Distress] : no respiratory distress [Thyroid Not Enlarged] : the thyroid was not enlarged [Normal Rate] : heart rate was normal  [Normal Rate and Effort] : normal respiratory rhythm and effort [No Stigmata of Cushings Syndrome] : no stigmata of cushings syndrome [Oriented x3] : oriented to person, place, and time [Normal Affect] : the affect was normal [Normal Mood] : the mood was normal [Normal Insight/Judgement] : insight and judgment were intact

## 2019-12-23 LAB
ALBUMIN SERPL ELPH-MCNC: 4.6 G/DL
ALP BLD-CCNC: 94 U/L
ALT SERPL-CCNC: 22 U/L
ANION GAP SERPL CALC-SCNC: 14 MMOL/L
AST SERPL-CCNC: 27 U/L
BASOPHILS # BLD AUTO: 0.05 K/UL
BASOPHILS NFR BLD AUTO: 0.8 %
BILIRUB SERPL-MCNC: 0.4 MG/DL
BUN SERPL-MCNC: 19 MG/DL
CALCIUM SERPL-MCNC: 9.8 MG/DL
CHLORIDE SERPL-SCNC: 103 MMOL/L
CO2 SERPL-SCNC: 23 MMOL/L
CREAT SERPL-MCNC: 0.92 MG/DL
EOSINOPHIL # BLD AUTO: 0.26 K/UL
EOSINOPHIL NFR BLD AUTO: 3.9 %
ESTIMATED AVERAGE GLUCOSE: 131 MG/DL
GLUCOSE SERPL-MCNC: 106 MG/DL
HBA1C MFR BLD HPLC: 6.2 %
HCT VFR BLD CALC: 44.2 %
HGB BLD-MCNC: 14 G/DL
IMM GRANULOCYTES NFR BLD AUTO: 0.3 %
LYMPHOCYTES # BLD AUTO: 2.37 K/UL
LYMPHOCYTES NFR BLD AUTO: 35.7 %
MAN DIFF?: NORMAL
MCHC RBC-ENTMCNC: 31.7 GM/DL
MCHC RBC-ENTMCNC: 31.8 PG
MCV RBC AUTO: 100.5 FL
MONOCYTES # BLD AUTO: 0.51 K/UL
MONOCYTES NFR BLD AUTO: 7.7 %
NEUTROPHILS # BLD AUTO: 3.42 K/UL
NEUTROPHILS NFR BLD AUTO: 51.6 %
PLATELET # BLD AUTO: 195 K/UL
POTASSIUM SERPL-SCNC: 4.8 MMOL/L
PROT SERPL-MCNC: 7.1 G/DL
PSA SERPL-MCNC: <0.01 NG/ML
RBC # BLD: 4.4 M/UL
RBC # FLD: 13.4 %
SODIUM SERPL-SCNC: 140 MMOL/L
T3 SERPL-MCNC: 120 NG/DL
T4 FREE SERPL-MCNC: 1.3 NG/DL
TSH SERPL-ACNC: 3.91 UIU/ML
WBC # FLD AUTO: 6.63 K/UL

## 2019-12-25 LAB
FOLATE SERPL-MCNC: 11.7 NG/ML
VIT B12 SERPL-MCNC: 806 PG/ML

## 2020-01-27 ENCOUNTER — APPOINTMENT (OUTPATIENT)
Dept: INTERNAL MEDICINE | Facility: CLINIC | Age: 70
End: 2020-01-27
Payer: MEDICARE

## 2020-01-27 ENCOUNTER — LABORATORY RESULT (OUTPATIENT)
Age: 70
End: 2020-01-27

## 2020-01-27 VITALS
SYSTOLIC BLOOD PRESSURE: 156 MMHG | BODY MASS INDEX: 24.35 KG/M2 | HEIGHT: 61 IN | TEMPERATURE: 97.7 F | DIASTOLIC BLOOD PRESSURE: 76 MMHG | WEIGHT: 129 LBS

## 2020-01-27 LAB
BILIRUB UR QL STRIP: NORMAL
CLARITY UR: CLEAR
COLLECTION METHOD: NORMAL
GLUCOSE UR-MCNC: NORMAL
HCG UR QL: 0.2 EU/DL
HGB UR QL STRIP.AUTO: NORMAL
KETONES UR-MCNC: NORMAL
LEUKOCYTE ESTERASE UR QL STRIP: NORMAL
NITRITE UR QL STRIP: NORMAL
PH UR STRIP: 5.5
PROT UR STRIP-MCNC: NORMAL
SP GR UR STRIP: 1.03

## 2020-01-27 PROCEDURE — 81002 URINALYSIS NONAUTO W/O SCOPE: CPT

## 2020-01-27 PROCEDURE — 99214 OFFICE O/P EST MOD 30 MIN: CPT | Mod: 25

## 2020-01-27 NOTE — PHYSICAL EXAM
[Normal] : normal rate, regular rhythm, normal S1 and S2 and no murmur heard [No Edema] : there was no peripheral edema [No CVA Tenderness] : no CVA  tenderness [No Spinal Tenderness] : no spinal tenderness [Normal Affect] : the affect was normal [Normal Mood] : the mood was normal [Normal Insight/Judgement] : insight and judgment were intact [Soft] : abdomen soft [Non Tender] : non-tender [Non-distended] : non-distended [No Masses] : no abdominal mass palpated [Coordination Grossly Intact] : coordination grossly intact [No Focal Deficits] : no focal deficits [Normal Gait] : normal gait

## 2020-01-27 NOTE — HEALTH RISK ASSESSMENT
[No] : In the past 12 months have you used drugs other than those required for medical reasons? No [No falls in past year] : Patient reported no falls in the past year [0] : 2) Feeling down, depressed, or hopeless: Not at all (0) [] : No [de-identified] : none [de-identified] : none

## 2020-01-28 ENCOUNTER — RESULT REVIEW (OUTPATIENT)
Age: 70
End: 2020-01-28

## 2020-01-28 LAB
APPEARANCE: ABNORMAL
BILIRUBIN URINE: NEGATIVE
BLOOD URINE: ABNORMAL
COLOR: YELLOW
GLUCOSE QUALITATIVE U: NEGATIVE
KETONES URINE: NEGATIVE
LEUKOCYTE ESTERASE URINE: NEGATIVE
NITRITE URINE: NEGATIVE
PH URINE: 6
PROTEIN URINE: NORMAL
SPECIFIC GRAVITY URINE: >=1.03
UROBILINOGEN URINE: NORMAL

## 2020-01-29 LAB — BACTERIA UR CULT: NORMAL

## 2020-06-15 ENCOUNTER — APPOINTMENT (OUTPATIENT)
Dept: INTERNAL MEDICINE | Facility: CLINIC | Age: 70
End: 2020-06-15
Payer: MEDICARE

## 2020-06-15 ENCOUNTER — APPOINTMENT (OUTPATIENT)
Dept: ENDOCRINOLOGY | Facility: CLINIC | Age: 70
End: 2020-06-15
Payer: MEDICARE

## 2020-06-15 VITALS
BODY MASS INDEX: 24.92 KG/M2 | HEIGHT: 61 IN | SYSTOLIC BLOOD PRESSURE: 134 MMHG | WEIGHT: 132 LBS | TEMPERATURE: 99.5 F | DIASTOLIC BLOOD PRESSURE: 70 MMHG

## 2020-06-15 VITALS
WEIGHT: 132 LBS | SYSTOLIC BLOOD PRESSURE: 134 MMHG | BODY MASS INDEX: 24.92 KG/M2 | HEART RATE: 68 BPM | DIASTOLIC BLOOD PRESSURE: 70 MMHG | HEIGHT: 61 IN

## 2020-06-15 PROCEDURE — 99214 OFFICE O/P EST MOD 30 MIN: CPT | Mod: 25

## 2020-06-15 PROCEDURE — 36415 COLL VENOUS BLD VENIPUNCTURE: CPT

## 2020-06-15 PROCEDURE — 90715 TDAP VACCINE 7 YRS/> IM: CPT

## 2020-06-15 PROCEDURE — 99214 OFFICE O/P EST MOD 30 MIN: CPT

## 2020-06-15 PROCEDURE — 90471 IMMUNIZATION ADMIN: CPT

## 2020-06-15 RX ORDER — PANTOPRAZOLE SODIUM 40 MG/10ML
40 INJECTION, POWDER, FOR SOLUTION INTRAVENOUS
Refills: 0 | Status: DISCONTINUED | COMMUNITY
End: 2020-06-15

## 2020-06-15 RX ORDER — FAMOTIDINE 40 MG/1
40 TABLET, FILM COATED ORAL
Refills: 0 | Status: DISCONTINUED | COMMUNITY
End: 2020-06-15

## 2020-06-15 NOTE — PHYSICAL EXAM
[Alert] : alert [Well Nourished] : well nourished [Healthy Appearance] : healthy appearance [No Acute Distress] : no acute distress [Well Developed] : well developed [No Lid Lag] : no lid lag [Normal Rate] : heart rate was normal [Thyroid Not Enlarged] : the thyroid was not enlarged [No Involuntary Movements] : no involuntary movements were seen [Spine Straight] : spine straight [Oriented x3] : oriented to person, place, and time [No Tremors] : no tremors [Normal Affect] : the affect was normal [Normal Insight/Judgement] : insight and judgment were intact [Normal Mood] : the mood was normal

## 2020-06-15 NOTE — PHYSICAL EXAM
[No Edema] : there was no peripheral edema [Normal] : normal rate, regular rhythm, normal S1 and S2 and no murmur heard [Soft] : abdomen soft [No Masses] : no abdominal mass palpated [Non Tender] : non-tender [No CVA Tenderness] : no CVA  tenderness [No Spinal Tenderness] : no spinal tenderness [Normal Bowel Sounds] : normal bowel sounds [Coordination Grossly Intact] : coordination grossly intact [No Focal Deficits] : no focal deficits [Normal Affect] : the affect was normal [Normal Gait] : normal gait [Normal Insight/Judgement] : insight and judgment were intact [Alert and Oriented x3] : oriented to person, place, and time [Normal Mood] : the mood was normal

## 2020-06-15 NOTE — COUNSELING
[FreeTextEntry2] : Pt counseled on proper diet and exercise. We discussed the importance of exercise in maintaining a healthy life style.\par Pt walks every day for exercise.

## 2020-06-15 NOTE — HISTORY OF PRESENT ILLNESS
[FreeTextEntry1] : Pravin 15, 2020     in person\par \par   PCP: Dr. Izzy Vital\par             Urol: Dr. Hurd p  in [place] \par             Eyes: Dr. Twan Suresh Jr (saw 3/2017 and again 1/2018)\par             GI: Dr. Oscar Laura- ? a few years ago - told of polyps \par             Derm:  Dr. Feli Husain - psoriasis\par            .\par            CC: Underactive thyroid (TPO and Tg Ab negative) Dx: ~12/2015  Dr. Driver\par                          3/26/18 U/S Thyroid - negative \par             (Prediabetes: 3/2016 A1c 6.4 12/12/2017 A1c 6.1, 3/2018: OGTT peak 180)\par             (B12 deficiency, B12 177, MCV elevated) \par             (spinal stenosis - Dr. Post) \par             (3/2017 - radical prostatectomy)\par \par Visits for hypothyroidism and history of A1c up to 6.4 %\par Feels well.\par Walks almost every day, down to [place].\par Taking levothyroxine 25 mcg daily - initial Dx by Dr. Driver\par \par Impression:  Dong well.\par \par Plan:  TSH today, follow up A1c.\par ROV six months.  \par \par \par Dec 20, 2019\par \par   PCP: Dr. Izzy Vital\par             Urol: Dr. Hurd p  in [place] \par             Eyes: Dr. Twan Suresh Jr (saw 3/2017 and again 1/2018)\par             GI: Dr. Oscar Laura- ? a few years ago - told of polyps \par             Derm:  Dr. Feli Husain\par            .\par            CC: Underactive thyroid (TPO and Tg Ab negative) Dx: ~12/2015  Dr. Driver\par                          3/26/18 U/S Thyroid - negative \par             (Prediabetes: 3/2016 A1c 6.4 12/12/2017 A1c 6.1, 3/2018: OGTT peak 180)\par             (B12 deficiency, B12 177, MCV elevated) \par             (spinal stenosis - Dr. Post) \par             (3/2017 - radical prostatectomy)\par \par 68 yo visits for hypothyroidism, prediabetes.  Saw Dr. Vital today.\par On B12 for B12 deficiency.\par Levothyroxine 25 mcg for very early hypothyroidism.  \par \par Lab test in June 2019 iincluded\par TSH 2.84 (on 25 mcg of LT4)\par A1c 6.2 %\par \par \par Impression:  Doing well.\par Plan:  Review results of updated thyroid function tests, A1c, B12, folate.\par ROV several months.  \par \par \par \par \par \par \par Radha 3, 2019\par \par      .\par            PCP: Dr. Izzy Vital\par             Urol: Dr. Stacy Sotomayor\par             Eyes: Dr. Twan Suresh Jr (saw 3/2017 and again 1/2018)\par             GI: Dr. Oscar Laura- ? a few years ago - told of polyps \par             Derm:  Dr. Feli Husain\par            .\par            CC: Underactive thyroid (TPO and Tg Ab negative) Dx: ~12/2015  Dr. Driver\par                          3/26/18 U/S Thyroid - negative \par             (Prediabetes: 3/2016 A1c 6.4 12/12/2017 A1c 6.1, 3/2018: OGTT peak 180)\par             (B12 deficiency, B12 177)\par             (spinal stenosis - Dr. Post) \par             (3/2017 - radical prostatectomy)\par \par Lab tests from December 3, 2018 at [facility] included\par TSH 3.15\par HbA1c 6.0 %\par He reports that blood pressure has been running on the low side..\par \par His med list iincludes\par Losartan 50 mg daily\par Levothyroxine 25 mcg daily and\par Vitamin b12 1000 mcg daily.\par \par Impression: : doiing well\par TFTs today and ROV 6 months.   \par \par \par \par \par December 3, 2018\par \par            .\par            PCP: Dr. Izzy Vital\par             Urol: Dr. Stacy Sotomayor\par             Eyes: Dr. Demetrice Jr (saw 3/2017 and again 1/2018)\par             GI: Dr. Laura- ? a few years ago - told of polyps \par             Derm:  Dr. MELODIE Husain\par            .\par            CC: Underactive thyroid (TPO and Tg Ab negative)\par             3/26/18 U/S Thyroid - negative \par             (Prediabetes: 3/2016 A1c 6.4 12/12/2017 A1c 6.1, 3/2018: OGTT peak 180)\par             (B12 deficiency, B12 177)\par             (spinal stenosis - Dr. Post) \par             (3/2017 - radical prostatectomy)\par \par Has early hypothyroidism.  Initial Rx with levothyroxine did not go well:  he felt poorly and stopped.\par Now taking 25 mcg daily and tolerating it well.\par He was reluctant to take vitamin B12 for blood level of 177, but now takes it regularly.\par A1c as high as 6.4 % indicating prediabetes.\par .\par Imrpession:  He feels well and is doing well.\par Plan:  As arranged by Dr. Vital he will have A1c, TFTs checked today.\par ROV ~6 months.  \par \par \par Previous notes from [facility] appended below:\par \par \par  April 9, 2018\par            .\par            PCP: Dr. Izzy Vital\par             Urol: Dr. Stacy Sotomayor\par             Eyes: Dr. Demetrice Jr (saw 3/2017 and again 1/2018)\par             GI: Dr. Laura- ? a few years ago - told of polyps \par            .\par            CC: Underactive thyroid (TPO and Tg Ab negative)\par             3/26/18 U/S Thyroid - negative \par             (Prediabetes: 3/2016 A1c 6.4 12/12/2017 A1c 6.1, 3/2018: OGTT peak 180)\par             (B12 deficiency) \par             (spinal stenosis - Dr. Post) \par             (3/2017 - radical prostatectomy)\par            .\par            With some negotiating, patient agreed to take vitamin B12 1000 mcg daily and eventually agreed to take levothyroxine 25 mcg every other day. He is also on Losartan.\par            .\par            He recently saw Dr. Sotomayor and lab tests from 3/27/2018 include undetectable TSA.\par            Labs from 3/26/2018 show\par            TSH 3.62, down from 6.25 in December\par            December B12 level was 1119 \par            Ultrasound thryoid was very reassuring.\par            A1c had gone from 5.7 to 6.1 so he went for formal OGTT:\par            FBS wqs 105 and peak (2 hr) sugar was 180 - also consistant with\par            diagnosis of prediabetes.\par            .\par            Impression Early hypothyroidism well controlled on levothyroxine 25 mcg QOD. He is aware of diagnosis of prediabetes. As he is reluctant to do self blood glucose monitoring at this time, A1c once-twice a year and occasional fasting and random glucose levels should suffice for now\par            Encourage walking.\par            .\par            ROV here when he returns for CPX, likely in December. \par            .\par            .\par            ==\par            .\par            December 15, 2017\par            .\par            .\par            PCP: Dr. Izzy Vital\par             Urol: Dr. Stacy Sotomayor\par             Eyes: Dr. Demetrice Jr (saw 3/2017 and again 1/2018)\par             GI: Dr. Laura- ? a few years ago - told of polyps \par            .\par            CC: Underactive thyroid (TPO and Tg Ab negative)\par             (Prediabetes)\par             (B12 deficiency) \par             (spinal stenosis - Dr. Post) \par             (3/2017 - radical prostatectomy)\par            .\par            68 yo former [employer] employee.\par            .\par            He returns regarding early ("pre-") hypothyroidism and prediabetes.\par            He does not like to take medication. He has well documented B12 deficiency and it took a lot of convincing to get him to take po B12, but he does take it now.\par            He did try taking levothyroxine in the past, but did not like how it made him feel.\par            He knows he has prediabetes, but is not quite ready to start self blood glucose monitoring. \par            .\par            Lab tests (Rob) kindly provided by Dr. Vital from\par            12/12/2017 include\par            free T4 0.8\par            TSH 6.25 **\par            B12 1119\par            glucose 105 mg/dl (fasting)\par            A1c 6.1 % (5.7 in May)\par            .\par            Impression: Slowly stuttering into hypothyroidism.\par            He would also like ultrasound of thyroid. \par            .\par            Plan: He is willing to try 25 mcg levothyroxine. I will start him on it every other day. Ultrasound thyroid. \par            .\par            ROV in February. Will address the sugars then. Thank you. -\par            .\par            ==\par            .\par            May 8, 2017\par            .\par            PCP: Dr. Izzy Vital\par             Urol: Dr. Stacy Sotomayor\par            .\par            CC: Underactive thyroid. \par             (Prediabetes)\par             (B12 deficiency) \par             (spinal stenosis - Dr. Post) \par            .\par            Since last visit, underwent radical prostatectomy March 2017\par            Feels well.\par            .\par            November Thyroid and B12 levels in good range. \par            .\par            Plan: A1c, TSH.\par            ROV 8 months. \par            .\par            ==\par            .\par            November 21, 2016\par            .\par            PCP: Dr. Maik Driver\par            .\par            CC: Underactive thyroid. \par             (Prediabetes)\par             (B12 deficiency) \par             (spinal stenosis - Dr. Post) \par            .\par            On po B12, his B12 level went from 170's to 700's.\par            He stopped his thyroid pill.\par            His last A1c 6.4 % (pre-diabetes).\par            .\par            Plan: Check\par            B12\par            A1c\par            TSH\par            ROV 6-8 months.\par            .\par            .\par            ==\par            .\par            July 20, 2016\par            .\par            PCP: Dr. Maik Driver\par            .\par            CC: Underactive thyroid. \par             (Prediabetes)\par             (B12 deficiency) \par             (spinal stenosis - Dr. Post) \par            .\par            Because of L shoulder pain, recently saw Dr. Castro and received benefit from Voltanen 75 mg with Omeprazole \par            .\par            Labs in March included\par             mg/dl\par            TSH 2.65\par            B12 171 (before starting po B12) \par            HbA1c 6.4 % **\par            .\par            Impression: TSH is remaining within normal limits even though he chose not to take levothyroxine.\par            .\par            B12 is being treated. \par            .\par            A1c indicates pre-diabetes - counsedled today and in the future will consider monitoring glucose levels. \par            .\par            ==\par            .\par            May 19, 2016\par            .\par            PCP: Dr. Maik Driver\par            .\par            CC: Underactive thyroid. \par             (spinal stenosis - Dr. Post) \par            .\par            He did not like the thyroid medication, so he stopped it (!)\par            He is taking\par            Vitamin B12 1000 mcg daily and\par            Telmisartan (Micardis) 20 mg daily. \par            .\par            He went for lab tests (Rob)\par            3/17/2016\par            vitamin B12 171 *** (despite po B12 x 1 month)\par            TSH 2.65\par            .\par            .\par            Impression: "I feel pretty good...."\par            TSH has remained normal, even though he has stopped the\par            levothyroxine.\par            However, despite taking 1000 mcg of PO B12 for one month, the level is still low. \par            .\par            Plan: Advised him he may benefit from injectable B12\par            He will discuss with Dr. Driver. \par            .\par            ==\par            .\par            March 17, 2016\par            .\par            PCP: Dr. Maik Driver\par            .\par            CC: Underactive thyroid. \par             (spinal stenosis - Dr. Post) \par            .\par            Currently tolerating thyroid medication.\par            .\par            Plan: Continue to monitor TFTs on levothyroxine and monitor his symptoms. \par            .\par            ==\par            .\par            January 18, 2016\par            .\par            PCP: Dr. Maik Driver\par            .\par            CC: Underactive thyroid. \par             (spinal stenosis - Dr. Post) \par            .\par            Accompanied by his daughter, Sarah.\par            His wife, Osiris, visits here.\par            .\par            December he started on levothyroxine 50 mcg daily.\par            He noted sweating and dose changed to 75 mcg.\par            Took last levothyroxine a week ago and his leg and back pain and sweats went away. \par            He is on Telmisartan 20 mg daily for elevated blood pressure. \par            .\par            Impression: Reason for symptoms not clear.\par            .\par            Plan: Updated labs today and obtain old records (offices closed for [facility] Day). TOV 6-8 weeks.

## 2020-06-15 NOTE — ASSESSMENT
[FreeTextEntry1] : Hypothyroid by history - diagnosis by Dr. Driver.\par Doing weel on levothyroxine 25 mcg daily. \par \par A1c in December:  6.2 %

## 2020-06-15 NOTE — HEALTH RISK ASSESSMENT
[No falls in past year] : Patient reported no falls in the past year [No] : In the past 12 months have you used drugs other than those required for medical reasons? No [0] : 2) Feeling down, depressed, or hopeless: Not at all (0) [Patient reported colonoscopy was normal] : Patient reported colonoscopy was normal [HIV test declined] : HIV test declined [Hepatitis C test declined] : Hepatitis C test declined [None] : None [Retired] : retired [With Family] : lives with family [] :  [Fully functional (using the telephone, shopping, preparing meals, housekeeping, doing laundry, using] : Fully functional and needs no help or supervision to perform IADLs (using the telephone, shopping, preparing meals, housekeeping, doing laundry, using transportation, managing medications and managing finances) [Fully functional (bathing, dressing, toileting, transferring, walking, feeding)] : Fully functional (bathing, dressing, toileting, transferring, walking, feeding) [Change in mental status noted] : No change in mental status noted [Reports changes in dental health] : Reports no changes in dental health [Reports changes in hearing] : Reports no changes in hearing [Reports changes in vision] : Reports no changes in vision [ColonoscopyDate] : 03/15 [ColonoscopyComments] : Dr Laura [] : No [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

## 2020-06-15 NOTE — HISTORY OF PRESENT ILLNESS
[FreeTextEntry1] : follow-up blood pressure  [de-identified] : Pt here for routine follow up visit. He feels well overall. He followed with urology for hematuria and had both MRI and cystoscopy and was told all was wnl. \par He walks a long distance daily for exercise. No cardiac complaints.\par Pt has incontinence which is improving overall. He still uses pads but usually only one per day which is sometimes not wet.

## 2020-06-17 LAB
ALBUMIN SERPL ELPH-MCNC: 4.2 G/DL
ALP BLD-CCNC: 97 U/L
ALT SERPL-CCNC: 22 U/L
ANION GAP SERPL CALC-SCNC: 14 MMOL/L
AST SERPL-CCNC: 27 U/L
BILIRUB SERPL-MCNC: 0.4 MG/DL
BUN SERPL-MCNC: 25 MG/DL
CALCIUM SERPL-MCNC: 9.3 MG/DL
CHLORIDE SERPL-SCNC: 106 MMOL/L
CO2 SERPL-SCNC: 23 MMOL/L
CREAT SERPL-MCNC: 0.93 MG/DL
ESTIMATED AVERAGE GLUCOSE: 128 MG/DL
GLUCOSE SERPL-MCNC: 114 MG/DL
HBA1C MFR BLD HPLC: 6.1 %
POTASSIUM SERPL-SCNC: 4.1 MMOL/L
PROT SERPL-MCNC: 6.5 G/DL
SARS-COV-2 IGG SERPL IA-ACNC: 1.9 INDEX
SARS-COV-2 IGG SERPL QL IA: POSITIVE
SODIUM SERPL-SCNC: 143 MMOL/L
TSH SERPL-ACNC: 2.29 UIU/ML

## 2020-08-18 ENCOUNTER — RX RENEWAL (OUTPATIENT)
Age: 70
End: 2020-08-18

## 2020-09-25 ENCOUNTER — APPOINTMENT (OUTPATIENT)
Dept: INTERNAL MEDICINE | Facility: CLINIC | Age: 70
End: 2020-09-25
Payer: MEDICARE

## 2020-09-25 PROCEDURE — 90662 IIV NO PRSV INCREASED AG IM: CPT

## 2020-09-25 PROCEDURE — G0008: CPT

## 2020-12-18 ENCOUNTER — APPOINTMENT (OUTPATIENT)
Dept: ENDOCRINOLOGY | Facility: CLINIC | Age: 70
End: 2020-12-18
Payer: MEDICARE

## 2020-12-18 ENCOUNTER — RX RENEWAL (OUTPATIENT)
Age: 70
End: 2020-12-18

## 2020-12-18 VITALS
HEART RATE: 62 BPM | DIASTOLIC BLOOD PRESSURE: 80 MMHG | OXYGEN SATURATION: 96 % | WEIGHT: 141 LBS | HEIGHT: 61 IN | SYSTOLIC BLOOD PRESSURE: 138 MMHG | BODY MASS INDEX: 26.62 KG/M2

## 2020-12-18 PROCEDURE — 36415 COLL VENOUS BLD VENIPUNCTURE: CPT

## 2020-12-18 PROCEDURE — 99214 OFFICE O/P EST MOD 30 MIN: CPT | Mod: 25

## 2020-12-18 NOTE — HISTORY OF PRESENT ILLNESS
[FreeTextEntry1] : Dec 18, 2020    in person\par \par   PCP: Dr. Izzy Vital\par             Urol: Dr. Hurd p  in [place] \par             Eyes: Dr. Twan Suresh Jr (saw 3/2017 and again 1/2018)\par             GI: Dr. Oscar Laura- ? a few years ago - told of polyps \par             Derm:  Dr. Feli Husain - psoriasis\par            .\par            CC: Underactive thyroid (TPO and Tg Ab negative) Dx: ~12/2015  Dr. Driver\par                          3/26/18 U/S Thyroid - negative \par             (Prediabetes: 3/2016 A1c 6.4 12/12/2017 A1c 6.1, 3/2018: OGTT peak 180)\par             (B12 deficiency, B12 177, MCV elevated) \par             (spinal stenosis - Dr. Post) \par             (3/2017 - radical prostatectomy)\par             (6/2020 - asymptomatic Covid 19 positive ab) \par \par Visits for hypothyroidism and history of A1c up to 6.4 %\par June 2020 A1c was 6.1% and  mg/dl\par TSH 2.29 on LT4 25 mcg daily \par \par : :\par Constitutional:  Alert, well nourished, healthy appearance, no acute distress \par Eyes:  No proptosis, no stare\par Thyroid:\par Pulmonary:  No respiratory distress, no accessory muscle use; normal rate and effort\par Cardiac:  Normal rate\par Vascular: \par Endocrine:  No stigmata of Cushing’s Syndrome\par Musculoskeletal:  Normal gait, no involuntary movements\par Neurology:  No tremors\par Affect/Mood/Psych:  Oriented x 3; normal affect, normal insight/judgement, normal mood \par .\par  Impression:  Hypothyrodism well controlled.\par Diabetes:  diet controled.\par \par \par \par \par \par Pravin 15, 2020     in person\par \par   PCP: Dr. Izzy Vital\par             Urol: Dr. Hurd p  in [place] \par             Eyes: Dr. Twan Suresh Jr (saw 3/2017 and again 1/2018)\par             GI: Dr. Oscar Laura- ? a few years ago - told of polyps \par             Derm:  Dr. Feli Husain - psoriasis\par            .\par            CC: Underactive thyroid (TPO and Tg Ab negative) Dx: ~12/2015  Dr. Driver\par                          3/26/18 U/S Thyroid - negative \par             (Prediabetes: 3/2016 A1c 6.4 12/12/2017 A1c 6.1, 3/2018: OGTT peak 180)\par             (B12 deficiency, B12 177, MCV elevated) \par             (spinal stenosis - Dr. Post) \par             (3/2017 - radical prostatectomy)\par \par Visits for hypothyroidism and history of A1c up to 6.4 %\par Feels well.\par Walks almost every day, down to [place].\par Taking levothyroxine 25 mcg daily - initial Dx by Dr. Driver\par \par Impression:  Dong well.\par \par Plan:  TSH today, follow up A1c.\par ROV six months.  \par \par \par Dec 20, 2019\par \par   PCP: Dr. Izzy Vital\par             Urol: Dr. Hurd p  in [place] \par             Eyes: Dr. Twan Suresh Jr (saw 3/2017 and again 1/2018)\par             GI: Dr. Oscar Laura- ? a few years ago - told of polyps \par             Derm:  Dr. Feli Husain\par            .\par            CC: Underactive thyroid (TPO and Tg Ab negative) Dx: ~12/2015  Dr. Driver\par                          3/26/18 U/S Thyroid - negative \par             (Prediabetes: 3/2016 A1c 6.4 12/12/2017 A1c 6.1, 3/2018: OGTT peak 180)\par             (B12 deficiency, B12 177, MCV elevated) \par             (spinal stenosis - Dr. Post) \par             (3/2017 - radical prostatectomy)\par \par 68 yo visits for hypothyroidism, prediabetes.  Saw Dr. Vital today.\par On B12 for B12 deficiency.\par Levothyroxine 25 mcg for very early hypothyroidism.  \par \par Lab test in June 2019 iincluded\par TSH 2.84 (on 25 mcg of LT4)\par A1c 6.2 %\par \par \par Impression:  Doing well.\par Plan:  Review results of updated thyroid function tests, A1c, B12, folate.\par ROV several months.  \par \par \par \par \par \par \par Radha 3, 2019\par \par      .\par            PCP: Dr. Izzy Vital\par             Urol: Dr. Stacy Sotomayor\par             Eyes: Dr. Twan Suresh Jr (saw 3/2017 and again 1/2018)\par             GI: Dr. Oscar Laura- ? a few years ago - told of polyps \par             Derm:  Dr. Feli Husain\par            .\par            CC: Underactive thyroid (TPO and Tg Ab negative) Dx: ~12/2015  Dr. Driver\par                          3/26/18 U/S Thyroid - negative \par             (Prediabetes: 3/2016 A1c 6.4 12/12/2017 A1c 6.1, 3/2018: OGTT peak 180)\par             (B12 deficiency, B12 177)\par             (spinal stenosis - Dr. Post) \par             (3/2017 - radical prostatectomy)\par \par Lab tests from December 3, 2018 at [facility] included\par TSH 3.15\par HbA1c 6.0 %\par He reports that blood pressure has been running on the low side..\par \par His med list iincludes\par Losartan 50 mg daily\par Levothyroxine 25 mcg daily and\par Vitamin b12 1000 mcg daily.\par \par Impression: : doiing well\par TFTs today and ROV 6 months.   \par \par \par \par \par December 3, 2018\par \par            .\par            PCP: Dr. Izzy Vital\par             Urol: Dr. Stacy Sotomayor\par             Eyes: Dr. Demetrice Jr (saw 3/2017 and again 1/2018)\par             GI: Dr. Laura- ? a few years ago - told of polyps \par             Derm:  Dr. MELODIE Husain\par            .\par            CC: Underactive thyroid (TPO and Tg Ab negative)\par             3/26/18 U/S Thyroid - negative \par             (Prediabetes: 3/2016 A1c 6.4 12/12/2017 A1c 6.1, 3/2018: OGTT peak 180)\par             (B12 deficiency, B12 177)\par             (spinal stenosis - Dr. Post) \par             (3/2017 - radical prostatectomy)\par \par Has early hypothyroidism.  Initial Rx with levothyroxine did not go well:  he felt poorly and stopped.\par Now taking 25 mcg daily and tolerating it well.\par He was reluctant to take vitamin B12 for blood level of 177, but now takes it regularly.\par A1c as high as 6.4 % indicating prediabetes.\par .\par Imrpession:  He feels well and is doing well.\par Plan:  As arranged by Dr. Vital he will have A1c, TFTs checked today.\par ROV ~6 months.  \par \par \par Previous notes from [facility] appended below:\par \par \par  April 9, 2018\par            .\par            PCP: Dr. Izzy Vital\par             Urol: Dr. Stacy Sotomayor\par             Eyes: Dr. Demetrice Jr (saw 3/2017 and again 1/2018)\par             GI: Dr. Laura- ? a few years ago - told of polyps \par            .\par            CC: Underactive thyroid (TPO and Tg Ab negative)\par             3/26/18 U/S Thyroid - negative \par             (Prediabetes: 3/2016 A1c 6.4 12/12/2017 A1c 6.1, 3/2018: OGTT peak 180)\par             (B12 deficiency) \par             (spinal stenosis - Dr. Post) \par             (3/2017 - radical prostatectomy)\par            .\par            With some negotiating, patient agreed to take vitamin B12 1000 mcg daily and eventually agreed to take levothyroxine 25 mcg every other day. He is also on Losartan.\par            .\par            He recently saw Dr. Sotomayor and lab tests from 3/27/2018 include undetectable TSA.\par            Labs from 3/26/2018 show\par            TSH 3.62, down from 6.25 in December\par            December B12 level was 1119 \par            Ultrasound thryoid was very reassuring.\par            A1c had gone from 5.7 to 6.1 so he went for formal OGTT:\par            FBS wqs 105 and peak (2 hr) sugar was 180 - also consistant with\par            diagnosis of prediabetes.\par            .\par            Impression Early hypothyroidism well controlled on levothyroxine 25 mcg QOD. He is aware of diagnosis of prediabetes. As he is reluctant to do self blood glucose monitoring at this time, A1c once-twice a year and occasional fasting and random glucose levels should suffice for now\par            Encourage walking.\par            .\par            ROV here when he returns for CPX, likely in December. \par            .\par            .\par            ==\par            .\par            December 15, 2017\par            .\par            .\par            PCP: Dr. Izzy Vital\par             Urol: Dr. Stacy Sotomayor\par             Eyes: Dr. Demetrice Jr (saw 3/2017 and again 1/2018)\par             GI: Dr. Laura- ? a few years ago - told of polyps \par            .\par            CC: Underactive thyroid (TPO and Tg Ab negative)\par             (Prediabetes)\par             (B12 deficiency) \par             (spinal stenosis - Dr. Post) \par             (3/2017 - radical prostatectomy)\par            .\par            68 yo former [employer] employee.\par            .\par            He returns regarding early ("pre-") hypothyroidism and prediabetes.\par            He does not like to take medication. He has well documented B12 deficiency and it took a lot of convincing to get him to take po B12, but he does take it now.\par            He did try taking levothyroxine in the past, but did not like how it made him feel.\par            He knows he has prediabetes, but is not quite ready to start self blood glucose monitoring. \par            .\par            Lab tests (Rob) kindly provided by Dr. Vital from\par            12/12/2017 include\par            free T4 0.8\par            TSH 6.25 **\par            B12 1119\par            glucose 105 mg/dl (fasting)\par            A1c 6.1 % (5.7 in May)\par            .\par            Impression: Slowly stuttering into hypothyroidism.\par            He would also like ultrasound of thyroid. \par            .\par            Plan: He is willing to try 25 mcg levothyroxine. I will start him on it every other day. Ultrasound thyroid. \par            .\par            ROV in February. Will address the sugars then. Thank you. -\par            .\par            ==\par            .\par            May 8, 2017\par            .\par            PCP: Dr. Izzy Vital\par             Urol: Dr. Stacy Sotomayor\par            .\par            CC: Underactive thyroid. \par             (Prediabetes)\par             (B12 deficiency) \par             (spinal stenosis - DrEdis Sejal) \par            .\par            Since last visit, underwent radical prostatectomy March 2017\par            Feels well.\par            .\par            November Thyroid and B12 levels in good range. \par            .\par            Plan: A1c, TSH.\par            ROV 8 months. \par            .\par            ==\par            .\par            November 21, 2016\par            .\par            PCP: Dr. Maik Driver\par            .\par            CC: Underactive thyroid. \par             (Prediabetes)\par             (B12 deficiency) \par             (spinal stenosis - DrEdis Sejal) \par            .\par            On po B12, his B12 level went from 170's to 700's.\par            He stopped his thyroid pill.\par            His last A1c 6.4 % (pre-diabetes).\par            .\par            Plan: Check\par            B12\par            A1c\par            TSH\par            ROV 6-8 months.\par            .\par            .\par            ==\par            .\par            July 20, 2016\par            .\par            PCP: Dr. Maik Driver\par            .\par            CC: Underactive thyroid. \par             (Prediabetes)\par             (B12 deficiency) \par             (spinal stenosis - DrEdis Sejal) \par            .\par            Because of L shoulder pain, recently saw Dr. Castro and received benefit from Voltanen 75 mg with Omeprazole \par            .\par            Labs in March included\par             mg/dl\par            TSH 2.65\par            B12 171 (before starting po B12) \par            HbA1c 6.4 % **\par            .\par            Impression: TSH is remaining within normal limits even though he chose not to take levothyroxine.\par            .\par            B12 is being treated. \par            .\par            A1c indicates pre-diabetes - counsedled today and in the future will consider monitoring glucose levels. \par            .\par            ==\par            .\par            May 19, 2016\par            .\par            PCP: Dr. Maik Driver\par            .\par            CC: Underactive thyroid. \par             (spinal stenosis - DrEdis Sejal) \par            .\par            He did not like the thyroid medication, so he stopped it (!)\par            He is taking\par            Vitamin B12 1000 mcg daily and\par            Telmisartan (Micardis) 20 mg daily. \par            .\par            He went for lab tests (Rob)\par            3/17/2016\par            vitamin B12 171 *** (despite po B12 x 1 month)\par            TSH 2.65\par            .\par            .\par            Impression: "I feel pretty good...."\par            TSH has remained normal, even though he has stopped the\par            levothyroxine.\par            However, despite taking 1000 mcg of PO B12 for one month, the level is still low. \par            .\par            Plan: Advised him he may benefit from injectable B12\par            He will discuss with Dr. Driver. \par            .\par            ==\par            .\par            March 17, 2016\par            .\par            PCP: Dr. Maik Driver\par            .\par            CC: Underactive thyroid. \par             (spinal stenosis - Dr. Post) \par            .\par            Currently tolerating thyroid medication.\par            .\par            Plan: Continue to monitor TFTs on levothyroxine and monitor his symptoms. \par            .\par            ==\par            .\par            January 18, 2016\par            .\par            PCP: Dr. Maik Driver\par            .\par            CC: Underactive thyroid. \par             (spinal stenosis - Dr. Post) \par            .\par            Accompanied by his daughter, Sarah.\par            His wife, Osiris, visits here.\par            .\par            December he started on levothyroxine 50 mcg daily.\par            He noted sweating and dose changed to 75 mcg.\par            Took last levothyroxine a week ago and his leg and back pain and sweats went away. \par            He is on Telmisartan 20 mg daily for elevated blood pressure. \par            .\par            Impression: Reason for symptoms not clear.\par            .\par            Plan: Updated labs today and obtain old records (offices closed for [facility] Day). TOV 6-8 weeks.

## 2020-12-28 ENCOUNTER — APPOINTMENT (OUTPATIENT)
Dept: INTERNAL MEDICINE | Facility: CLINIC | Age: 70
End: 2020-12-28
Payer: MEDICARE

## 2020-12-28 ENCOUNTER — NON-APPOINTMENT (OUTPATIENT)
Age: 70
End: 2020-12-28

## 2020-12-28 VITALS
DIASTOLIC BLOOD PRESSURE: 70 MMHG | TEMPERATURE: 98.2 F | SYSTOLIC BLOOD PRESSURE: 156 MMHG | HEIGHT: 61 IN | WEIGHT: 136 LBS | BODY MASS INDEX: 25.68 KG/M2

## 2020-12-28 DIAGNOSIS — E53.8 DEFICIENCY OF OTHER SPECIFIED B GROUP VITAMINS: ICD-10-CM

## 2020-12-28 DIAGNOSIS — Z87.448 PERSONAL HISTORY OF OTHER DISEASES OF URINARY SYSTEM: ICD-10-CM

## 2020-12-28 DIAGNOSIS — Z20.828 CONTACT WITH AND (SUSPECTED) EXPOSURE TO OTHER VIRAL COMMUNICABLE DISEASES: ICD-10-CM

## 2020-12-28 PROCEDURE — G0444 DEPRESSION SCREEN ANNUAL: CPT | Mod: 59

## 2020-12-28 PROCEDURE — G0439: CPT

## 2020-12-28 PROCEDURE — 93000 ELECTROCARDIOGRAM COMPLETE: CPT | Mod: 59

## 2020-12-28 NOTE — HISTORY OF PRESENT ILLNESS
[PMH Reviewed and Updated] : past medical history reviewed and updated [PSH Reviewed and Updated] : past surgical history reviewed and updated [Family History Reviewed and Updated] : family history reviewed and updated [Medication and Allergies Reconciled] : medication and allergies reconciled [0] : 0 [Retired] : retired from work [None] : The patient has no concerns about alcohol abuse [Walking] : walking [Compliant with medications] : compliant with medications [Spouse] : spouse [Friends] : friends [Children] : children [Fully Independent] : fully independent [Drives without concerns] : drives without concerns [No history of falls] : no history of falls [Seatbelts] : seatbelts [Bicycle Helmet] : bicycle helmet [Motorcycle Helmet] : motorcycle helmet [Safe Driving Habits] : safe driving habits [Smoke Detectors] : smoke detectors [Carbon Monoxide Detector] : carbon monoxide detector [Hot Water Temp <120F] : hot water temp <120F [Bathroom Grab Bars] : bathroom grab bars [Fall Prevention Measures] : fall prevention measures [Patient Has Full Capacity] : this patient has full decision making capacity for discussion of advance care planning [de-identified] : Pt here for annual wellness exam. Feels well overall. \par He recently had injections in knee of hyaluronic acid in the knees as well as steroid injections. \par Pt is active overall. Walks every day for exercise. No chest pains or SOB. [Over the Past 2 Weeks, Have You Felt Down, Depressed, or Hopeless?] : 1.) Over the past 2 weeks, have you felt down, depressed, or hopeless? No [Over the Past 2 Weeks, Have You Felt Little Interest or Pleasure Doing Things?] : 2.) Over the past 2 weeks, have you felt little interest or pleasure doing things? No [Gun Trigger Locks] : not using gun trigger locks [Gun Safe] : not using a gun safe [CPR Training for Household] : did not receive CPR training for patient [Sunscreen] : not using sunscreen [CPR Training for Patient] : did not receive CPR training for patient [FreeTextEntry2] : none [de-identified] : none [de-identified] : none [FreeTextEntry1] : regular diet

## 2020-12-28 NOTE — PHYSICAL EXAM
[Normal] : normal rate, regular rhythm, normal S1 and S2 and no murmur heard [No Edema] : there was no peripheral edema [Soft] : abdomen soft [Non Tender] : non-tender [No Masses] : no abdominal mass palpated [Normal Bowel Sounds] : normal bowel sounds [No CVA Tenderness] : no CVA  tenderness [No Spinal Tenderness] : no spinal tenderness [Coordination Grossly Intact] : coordination grossly intact [No Focal Deficits] : no focal deficits [Normal Gait] : normal gait [Normal Affect] : the affect was normal [Alert and Oriented x3] : oriented to person, place, and time [Normal Mood] : the mood was normal [Normal Insight/Judgement] : insight and judgment were intact

## 2020-12-28 NOTE — HEALTH RISK ASSESSMENT
[Good] : ~his/her~  mood as  good [No] : No [No falls in past year] : Patient reported no falls in the past year [0] : 2) Feeling down, depressed, or hopeless: Not at all (0) [Patient reported colonoscopy was normal] : Patient reported colonoscopy was normal [HIV test declined] : HIV test declined [Hepatitis C test declined] : Hepatitis C test declined [None] : None [With Family] : lives with family [Retired] : retired [] :  [Fully functional (bathing, dressing, toileting, transferring, walking, feeding)] : Fully functional (bathing, dressing, toileting, transferring, walking, feeding) [Fully functional (using the telephone, shopping, preparing meals, housekeeping, doing laundry, using] : Fully functional and needs no help or supervision to perform IADLs (using the telephone, shopping, preparing meals, housekeeping, doing laundry, using transportation, managing medications and managing finances) [] : No [Change in mental status noted] : No change in mental status noted [Reports changes in hearing] : Reports no changes in hearing [Reports changes in vision] : Reports no changes in vision [Reports changes in dental health] : Reports no changes in dental health [ColonoscopyDate] : 03/15 [ColonoscopyComments] : Dr Laura [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

## 2021-01-19 ENCOUNTER — APPOINTMENT (OUTPATIENT)
Dept: INTERNAL MEDICINE | Facility: CLINIC | Age: 71
End: 2021-01-19
Payer: MEDICARE

## 2021-01-19 VITALS
DIASTOLIC BLOOD PRESSURE: 72 MMHG | SYSTOLIC BLOOD PRESSURE: 144 MMHG | WEIGHT: 138 LBS | TEMPERATURE: 97.8 F | HEIGHT: 61 IN | BODY MASS INDEX: 26.06 KG/M2

## 2021-01-19 DIAGNOSIS — Z90.79 ACQUIRED ABSENCE OF OTHER GENITAL ORGAN(S): ICD-10-CM

## 2021-01-19 PROCEDURE — 99214 OFFICE O/P EST MOD 30 MIN: CPT | Mod: 25

## 2021-01-19 PROCEDURE — 36415 COLL VENOUS BLD VENIPUNCTURE: CPT

## 2021-01-19 NOTE — PHYSICAL EXAM
[No JVD] : no jugular venous distention [No Edema] : there was no peripheral edema [Normal] : normal gait, coordination grossly intact, no focal deficits and deep tendon reflexes were 2+ and symmetric [Normal Affect] : the affect was normal [Normal Insight/Judgement] : insight and judgment were intact

## 2021-01-20 LAB
ALBUMIN SERPL ELPH-MCNC: 4.5 G/DL
ALP BLD-CCNC: 92 U/L
ALT SERPL-CCNC: 17 U/L
ANION GAP SERPL CALC-SCNC: 17 MMOL/L
AST SERPL-CCNC: 26 U/L
BASOPHILS # BLD AUTO: 0.06 K/UL
BASOPHILS NFR BLD AUTO: 0.9 %
BILIRUB SERPL-MCNC: 0.5 MG/DL
BUN SERPL-MCNC: 18 MG/DL
CALCIUM SERPL-MCNC: 9.5 MG/DL
CHLORIDE SERPL-SCNC: 105 MMOL/L
CO2 SERPL-SCNC: 21 MMOL/L
CREAT SERPL-MCNC: 1.11 MG/DL
EOSINOPHIL # BLD AUTO: 0.4 K/UL
EOSINOPHIL NFR BLD AUTO: 5.7 %
GLUCOSE SERPL-MCNC: 102 MG/DL
HCT VFR BLD CALC: 43.4 %
HGB BLD-MCNC: 13.8 G/DL
IMM GRANULOCYTES NFR BLD AUTO: 0.1 %
LYMPHOCYTES # BLD AUTO: 3.11 K/UL
LYMPHOCYTES NFR BLD AUTO: 44.5 %
MAN DIFF?: NORMAL
MCHC RBC-ENTMCNC: 31.3 PG
MCHC RBC-ENTMCNC: 31.8 GM/DL
MCV RBC AUTO: 98.4 FL
MONOCYTES # BLD AUTO: 0.5 K/UL
MONOCYTES NFR BLD AUTO: 7.2 %
NEUTROPHILS # BLD AUTO: 2.91 K/UL
NEUTROPHILS NFR BLD AUTO: 41.6 %
PLATELET # BLD AUTO: 211 K/UL
POTASSIUM SERPL-SCNC: 4.4 MMOL/L
PROT SERPL-MCNC: 7 G/DL
RBC # BLD: 4.41 M/UL
RBC # FLD: 12.9 %
SODIUM SERPL-SCNC: 143 MMOL/L
WBC # FLD AUTO: 6.99 K/UL

## 2021-01-20 NOTE — HEALTH RISK ASSESSMENT
[No] : In the past 12 months have you used drugs other than those required for medical reasons? No [No falls in past year] : Patient reported no falls in the past year [0] : 2) Feeling down, depressed, or hopeless: Not at all (0) [] : No [de-identified] : yes  [de-identified] : regular diet

## 2021-01-20 NOTE — HISTORY OF PRESENT ILLNESS
[FreeTextEntry1] : pre op [de-identified] : Pt here for pre op for cataract surgery. He has no known CAD. Treated for HTN with losartan. He will be having both eyes done two weeks apart in Feb. No complaints at present. No cardiac complaints. He can climb a flight of stairs without SOB or chest pains.

## 2021-02-09 ENCOUNTER — HOSPITAL ENCOUNTER (OUTPATIENT)
Dept: HOSPITAL 74 - FASU | Age: 71
Discharge: HOME | End: 2021-02-09
Attending: OPHTHALMOLOGY
Payer: COMMERCIAL

## 2021-02-09 VITALS — BODY MASS INDEX: 26.4 KG/M2

## 2021-02-09 VITALS — HEART RATE: 72 BPM | DIASTOLIC BLOOD PRESSURE: 84 MMHG | SYSTOLIC BLOOD PRESSURE: 141 MMHG

## 2021-02-09 VITALS — TEMPERATURE: 97.8 F

## 2021-02-09 DIAGNOSIS — H26.9: Primary | ICD-10-CM

## 2021-02-09 PROCEDURE — 08RK3JZ REPLACEMENT OF LEFT LENS WITH SYNTHETIC SUBSTITUTE, PERCUTANEOUS APPROACH: ICD-10-PCS | Performed by: OPHTHALMOLOGY

## 2021-02-09 RX ADMIN — PHENYLEPHRINE HYDROCHLORIDE SCH DROP: 0.25 SPRAY NASAL at 09:35

## 2021-02-09 RX ADMIN — PHENYLEPHRINE HYDROCHLORIDE SCH DROP: 0.25 SPRAY NASAL at 09:45

## 2021-02-09 RX ADMIN — PHENYLEPHRINE HYDROCHLORIDE SCH DROP: 0.25 SPRAY NASAL at 09:55

## 2021-02-09 RX ADMIN — KETOROLAC TROMETHAMINE SCH DROP: 5 SOLUTION OPHTHALMIC at 09:45

## 2021-02-09 RX ADMIN — OFLOXACIN SCH DROP: 3 SOLUTION/ DROPS OPHTHALMIC at 09:55

## 2021-02-09 RX ADMIN — CYCLOPENTOLATE HYDROCHLORIDE SCH DROP: 10 SOLUTION/ DROPS OPHTHALMIC at 09:50

## 2021-02-09 RX ADMIN — KETOROLAC TROMETHAMINE SCH DROP: 5 SOLUTION OPHTHALMIC at 09:35

## 2021-02-09 RX ADMIN — TROPICAMIDE SCH DROP: 10 SOLUTION/ DROPS OPHTHALMIC at 09:55

## 2021-02-09 RX ADMIN — TROPICAMIDE SCH DROP: 10 SOLUTION/ DROPS OPHTHALMIC at 09:50

## 2021-02-09 RX ADMIN — PHENYLEPHRINE HYDROCHLORIDE SCH DROP: 0.25 SPRAY NASAL at 09:40

## 2021-02-09 RX ADMIN — TROPICAMIDE SCH DROP: 10 SOLUTION/ DROPS OPHTHALMIC at 09:35

## 2021-02-09 RX ADMIN — OFLOXACIN SCH DROP: 3 SOLUTION/ DROPS OPHTHALMIC at 09:40

## 2021-02-09 RX ADMIN — TROPICAMIDE SCH DROP: 10 SOLUTION/ DROPS OPHTHALMIC at 09:40

## 2021-02-09 RX ADMIN — KETOROLAC TROMETHAMINE SCH DROP: 5 SOLUTION OPHTHALMIC at 09:55

## 2021-02-09 RX ADMIN — KETOROLAC TROMETHAMINE SCH DROP: 5 SOLUTION OPHTHALMIC at 09:40

## 2021-02-09 RX ADMIN — PHENYLEPHRINE HYDROCHLORIDE SCH DROP: 0.25 SPRAY NASAL at 09:50

## 2021-02-09 RX ADMIN — CYCLOPENTOLATE HYDROCHLORIDE SCH DROP: 10 SOLUTION/ DROPS OPHTHALMIC at 09:35

## 2021-02-09 RX ADMIN — KETOROLAC TROMETHAMINE SCH DROP: 5 SOLUTION OPHTHALMIC at 09:50

## 2021-02-09 RX ADMIN — CYCLOPENTOLATE HYDROCHLORIDE SCH DROP: 10 SOLUTION/ DROPS OPHTHALMIC at 09:45

## 2021-02-09 RX ADMIN — CYCLOPENTOLATE HYDROCHLORIDE SCH DROP: 10 SOLUTION/ DROPS OPHTHALMIC at 09:40

## 2021-02-09 RX ADMIN — OFLOXACIN SCH DROP: 3 SOLUTION/ DROPS OPHTHALMIC at 09:35

## 2021-02-09 RX ADMIN — OFLOXACIN SCH DROP: 3 SOLUTION/ DROPS OPHTHALMIC at 09:45

## 2021-02-09 RX ADMIN — CYCLOPENTOLATE HYDROCHLORIDE SCH DROP: 10 SOLUTION/ DROPS OPHTHALMIC at 09:55

## 2021-02-09 RX ADMIN — OFLOXACIN SCH DROP: 3 SOLUTION/ DROPS OPHTHALMIC at 09:50

## 2021-02-09 RX ADMIN — TROPICAMIDE SCH DROP: 10 SOLUTION/ DROPS OPHTHALMIC at 09:45

## 2021-02-25 ENCOUNTER — HOSPITAL ENCOUNTER (OUTPATIENT)
Dept: HOSPITAL 74 - FASU | Age: 71
Discharge: HOME | End: 2021-02-25
Attending: OPHTHALMOLOGY
Payer: COMMERCIAL

## 2021-02-25 VITALS — SYSTOLIC BLOOD PRESSURE: 134 MMHG | DIASTOLIC BLOOD PRESSURE: 65 MMHG

## 2021-02-25 VITALS — HEART RATE: 78 BPM | TEMPERATURE: 97.8 F

## 2021-02-25 VITALS — BODY MASS INDEX: 26.4 KG/M2

## 2021-02-25 DIAGNOSIS — H26.9: Primary | ICD-10-CM

## 2021-02-25 PROCEDURE — 08RJ3JZ REPLACEMENT OF RIGHT LENS WITH SYNTHETIC SUBSTITUTE, PERCUTANEOUS APPROACH: ICD-10-PCS | Performed by: OPHTHALMOLOGY

## 2021-03-26 ENCOUNTER — APPOINTMENT (OUTPATIENT)
Dept: RHEUMATOLOGY | Facility: CLINIC | Age: 71
End: 2021-03-26
Payer: MEDICARE

## 2021-03-26 ENCOUNTER — NON-APPOINTMENT (OUTPATIENT)
Age: 71
End: 2021-03-26

## 2021-03-26 VITALS
WEIGHT: 143 LBS | DIASTOLIC BLOOD PRESSURE: 78 MMHG | SYSTOLIC BLOOD PRESSURE: 118 MMHG | BODY MASS INDEX: 27 KG/M2 | HEIGHT: 61 IN

## 2021-03-26 PROCEDURE — 36415 COLL VENOUS BLD VENIPUNCTURE: CPT

## 2021-03-26 PROCEDURE — 99204 OFFICE O/P NEW MOD 45 MIN: CPT | Mod: 25

## 2021-03-29 LAB
ALBUMIN SERPL ELPH-MCNC: 4.4 G/DL
ALP BLD-CCNC: 95 U/L
ALT SERPL-CCNC: 22 U/L
ANION GAP SERPL CALC-SCNC: 11 MMOL/L
AST SERPL-CCNC: 34 U/L
BASOPHILS # BLD AUTO: 0.07 K/UL
BASOPHILS NFR BLD AUTO: 0.9 %
BILIRUB SERPL-MCNC: 0.3 MG/DL
BUN SERPL-MCNC: 16 MG/DL
CALCIUM SERPL-MCNC: 9.6 MG/DL
CHLORIDE SERPL-SCNC: 103 MMOL/L
CO2 SERPL-SCNC: 27 MMOL/L
CREAT SERPL-MCNC: 0.86 MG/DL
CRP SERPL-MCNC: <3 MG/L
EOSINOPHIL # BLD AUTO: 0.41 K/UL
EOSINOPHIL NFR BLD AUTO: 5.5 %
ERYTHROCYTE [SEDIMENTATION RATE] IN BLOOD BY WESTERGREN METHOD: 17 MM/HR
GLUCOSE SERPL-MCNC: 120 MG/DL
HCT VFR BLD CALC: 44.6 %
HGB BLD-MCNC: 13.8 G/DL
IMM GRANULOCYTES NFR BLD AUTO: 0.3 %
LYMPHOCYTES # BLD AUTO: 2.96 K/UL
LYMPHOCYTES NFR BLD AUTO: 39.5 %
MAN DIFF?: NORMAL
MCHC RBC-ENTMCNC: 30.9 GM/DL
MCHC RBC-ENTMCNC: 31.5 PG
MCV RBC AUTO: 101.8 FL
MONOCYTES # BLD AUTO: 0.51 K/UL
MONOCYTES NFR BLD AUTO: 6.8 %
NEUTROPHILS # BLD AUTO: 3.52 K/UL
NEUTROPHILS NFR BLD AUTO: 47 %
PLATELET # BLD AUTO: 209 K/UL
POTASSIUM SERPL-SCNC: 4.2 MMOL/L
PROT SERPL-MCNC: 7.1 G/DL
RBC # BLD: 4.38 M/UL
RBC # FLD: 13.6 %
SODIUM SERPL-SCNC: 141 MMOL/L
WBC # FLD AUTO: 7.49 K/UL

## 2021-04-01 LAB — HLA-B27 RELATED AG QL: NEGATIVE

## 2021-04-01 NOTE — HISTORY OF PRESENT ILLNESS
[FreeTextEntry1] : 72 yo male here for evaluation of joint pain.  He has left knee pain.  Dr. Castro gave him DAHL injections before Christmas.  The injections relieved the pain completely.  His knees were swelling before he got the injection.\par \par in 2019 he had left rotator cuff surgery.  Dr. Swift told him that he had arthritis in his joints also.\par His shoulder hurts more when the weather changes.  Tylenol doesn't really help.\par He has psoriasis on his legs and arms.  He sees Dr. Husain, who gives him an ointment (Betamethasone 0.05%) which he applies every day. Hi smother and twin brother has psoriasis also.  it itches but the steroid cream resolves the itch.  His fingers swell and hurt a little.  + morning stiffness, which improves with moving.\par \par He has pain in his lower back when bending over in the garden.\par \par No Raynauds.\par No oral ulcers.\par No dry eyes or mouth.\par \par mother also had OA

## 2021-04-16 ENCOUNTER — APPOINTMENT (OUTPATIENT)
Dept: RHEUMATOLOGY | Facility: CLINIC | Age: 71
End: 2021-04-16
Payer: MEDICARE

## 2021-04-16 VITALS
HEIGHT: 61 IN | WEIGHT: 143 LBS | SYSTOLIC BLOOD PRESSURE: 110 MMHG | BODY MASS INDEX: 27 KG/M2 | DIASTOLIC BLOOD PRESSURE: 76 MMHG

## 2021-04-16 PROCEDURE — 99215 OFFICE O/P EST HI 40 MIN: CPT

## 2021-04-24 NOTE — HISTORY OF PRESENT ILLNESS
[FreeTextEntry1] : He had Depomedrol 40 mg injection to his left knee, followed by Visco 3, about a year ago.  It helped for 2-3 months, and then wore off.\par He has psoriasis on his legs and he applies steroid cream for 3 days on, then 3 days off.  No new lesion.\par He gets pain in his fingers and right elbow.\par He is stiff in the morning and get better as he moves around.\par His knee hurts with going down hills.

## 2021-05-27 ENCOUNTER — RESULT REVIEW (OUTPATIENT)
Age: 71
End: 2021-05-27

## 2021-06-16 ENCOUNTER — LABORATORY RESULT (OUTPATIENT)
Age: 71
End: 2021-06-16

## 2021-06-16 ENCOUNTER — APPOINTMENT (OUTPATIENT)
Dept: INTERNAL MEDICINE | Facility: CLINIC | Age: 71
End: 2021-06-16
Payer: MEDICARE

## 2021-06-16 ENCOUNTER — APPOINTMENT (OUTPATIENT)
Dept: RHEUMATOLOGY | Facility: CLINIC | Age: 71
End: 2021-06-16
Payer: MEDICARE

## 2021-06-16 ENCOUNTER — APPOINTMENT (OUTPATIENT)
Dept: ENDOCRINOLOGY | Facility: CLINIC | Age: 71
End: 2021-06-16
Payer: MEDICARE

## 2021-06-16 VITALS
HEIGHT: 61 IN | WEIGHT: 136 LBS | TEMPERATURE: 98.1 F | DIASTOLIC BLOOD PRESSURE: 62 MMHG | BODY MASS INDEX: 25.68 KG/M2 | SYSTOLIC BLOOD PRESSURE: 122 MMHG

## 2021-06-16 VITALS
HEART RATE: 71 BPM | BODY MASS INDEX: 25.68 KG/M2 | SYSTOLIC BLOOD PRESSURE: 100 MMHG | HEIGHT: 61 IN | WEIGHT: 136 LBS | OXYGEN SATURATION: 95 % | DIASTOLIC BLOOD PRESSURE: 78 MMHG

## 2021-06-16 VITALS
DIASTOLIC BLOOD PRESSURE: 66 MMHG | TEMPERATURE: 98.5 F | SYSTOLIC BLOOD PRESSURE: 126 MMHG | WEIGHT: 136 LBS | BODY MASS INDEX: 25.68 KG/M2 | HEIGHT: 61 IN

## 2021-06-16 DIAGNOSIS — Z87.898 PERSONAL HISTORY OF OTHER SPECIFIED CONDITIONS: ICD-10-CM

## 2021-06-16 DIAGNOSIS — Z01.818 ENCOUNTER FOR OTHER PREPROCEDURAL EXAMINATION: ICD-10-CM

## 2021-06-16 LAB
ALBUMIN SERPL ELPH-MCNC: 4.5 G/DL
ALP BLD-CCNC: 97 U/L
ALT SERPL-CCNC: 14 U/L
ANION GAP SERPL CALC-SCNC: 16 MMOL/L
AST SERPL-CCNC: 23 U/L
BASOPHILS # BLD AUTO: 0.05 K/UL
BASOPHILS NFR BLD AUTO: 0.8 %
BILIRUB DIRECT SERPL-MCNC: 0.1 MG/DL
BILIRUB INDIRECT SERPL-MCNC: 0.3 MG/DL
BILIRUB SERPL-MCNC: 0.4 MG/DL
BUN SERPL-MCNC: 19 MG/DL
CALCIUM SERPL-MCNC: 9.5 MG/DL
CHLORIDE SERPL-SCNC: 106 MMOL/L
CO2 SERPL-SCNC: 22 MMOL/L
CREAT SERPL-MCNC: 1.01 MG/DL
EOSINOPHIL # BLD AUTO: 0.37 K/UL
EOSINOPHIL NFR BLD AUTO: 5.9 %
ESTIMATED AVERAGE GLUCOSE: 126 MG/DL
FOLATE SERPL-MCNC: 11.9 NG/ML
GLUCOSE SERPL-MCNC: 113 MG/DL
HBA1C MFR BLD HPLC: 6 %
HCT VFR BLD CALC: 42.6 %
HGB BLD-MCNC: 13.5 G/DL
IMM GRANULOCYTES NFR BLD AUTO: 0.2 %
LYMPHOCYTES # BLD AUTO: 2.36 K/UL
LYMPHOCYTES NFR BLD AUTO: 37.6 %
MAN DIFF?: NORMAL
MCHC RBC-ENTMCNC: 31.5 PG
MCHC RBC-ENTMCNC: 31.7 GM/DL
MCV RBC AUTO: 99.3 FL
MONOCYTES # BLD AUTO: 0.51 K/UL
MONOCYTES NFR BLD AUTO: 8.1 %
NEUTROPHILS # BLD AUTO: 2.97 K/UL
NEUTROPHILS NFR BLD AUTO: 47.4 %
PLATELET # BLD AUTO: 191 K/UL
POTASSIUM SERPL-SCNC: 4.4 MMOL/L
PROT SERPL-MCNC: 7 G/DL
PSA FREE FLD-MCNC: NORMAL %
PSA FREE SERPL-MCNC: <0.01 NG/ML
PSA SERPL-MCNC: <0.01 NG/ML
RBC # BLD: 4.29 M/UL
RBC # FLD: 13.4 %
SODIUM SERPL-SCNC: 144 MMOL/L
T3 SERPL-MCNC: 103 NG/DL
T4 SERPL-MCNC: 7.2 UG/DL
TSH SERPL-ACNC: 3.05 UIU/ML
VIT B12 SERPL-MCNC: 1231 PG/ML
WBC # FLD AUTO: 6.27 K/UL

## 2021-06-16 PROCEDURE — 99214 OFFICE O/P EST MOD 30 MIN: CPT | Mod: 25

## 2021-06-16 PROCEDURE — 36415 COLL VENOUS BLD VENIPUNCTURE: CPT

## 2021-06-16 PROCEDURE — 99214 OFFICE O/P EST MOD 30 MIN: CPT

## 2021-06-16 NOTE — HISTORY OF PRESENT ILLNESS
[FreeTextEntry1] : possible tick bite right eyelid [de-identified] : Pt was out in the yard on Saturday doing yard work. He felt a bug by his right eye and thought he brushed it off. He did not see anything in the mirror, but yesterday his daughter was looking at his eye and said there was something in the region under it. She says she removed something that seemed like a tick. Pt has no fever, no joint pains, no fatigue and no rash in the region, but came in for eval as he is worried it may have been a tick.

## 2021-06-16 NOTE — REVIEW OF SYSTEMS
[Negative] : Gastrointestinal [Fever] : no fever [Fatigue] : no fatigue [Joint Pain] : no joint pain [Skin Rash] : no skin rash [Swollen Glands] : no swollen glands

## 2021-06-16 NOTE — PHYSICAL EXAM
[No JVD] : no jugular venous distention [No Respiratory Distress] : no respiratory distress  [No Accessory Muscle Use] : no accessory muscle use [Clear to Auscultation] : lungs were clear to auscultation bilaterally [Normal Rate] : normal rate  [Regular Rhythm] : with a regular rhythm [Normal S1, S2] : normal S1 and S2 [No Edema] : there was no peripheral edema [Alert and Oriented x3] : oriented to person, place, and time [Normal Mood] : the mood was normal [Normal] : affect was normal and insight and judgment were intact [de-identified] : many skinlesions, recently saw derma nd had biopsy done on right calf, no rash on face in region where tick was removed, under right eyelid

## 2021-06-16 NOTE — HEALTH RISK ASSESSMENT
[No] : In the past 12 months have you used drugs other than those required for medical reasons? No [No falls in past year] : Patient reported no falls in the past year [0] : 2) Feeling down, depressed, or hopeless: Not at all (0) [] : No [de-identified] : none [de-identified] : regular diet

## 2021-06-17 LAB
ALBUMIN SERPL ELPH-MCNC: 4.4 G/DL
ALP BLD-CCNC: 95 U/L
ALT SERPL-CCNC: 20 U/L
ANION GAP SERPL CALC-SCNC: 13 MMOL/L
AST SERPL-CCNC: 27 U/L
B BURGDOR IGG+IGM SER QL IB: NORMAL
BASOPHILS # BLD AUTO: 0.04 K/UL
BASOPHILS NFR BLD AUTO: 0.7 %
BILIRUB SERPL-MCNC: 0.6 MG/DL
BUN SERPL-MCNC: 17 MG/DL
CALCIUM SERPL-MCNC: 9.5 MG/DL
CHLORIDE SERPL-SCNC: 103 MMOL/L
CO2 SERPL-SCNC: 23 MMOL/L
CREAT SERPL-MCNC: 0.92 MG/DL
CRP SERPL-MCNC: <3 MG/L
EOSINOPHIL # BLD AUTO: 0.22 K/UL
EOSINOPHIL NFR BLD AUTO: 3.8 %
GLUCOSE SERPL-MCNC: 102 MG/DL
HCT VFR BLD CALC: 44.7 %
HGB BLD-MCNC: 13.9 G/DL
IMM GRANULOCYTES NFR BLD AUTO: 0.2 %
LYMPHOCYTES # BLD AUTO: 2.18 K/UL
LYMPHOCYTES NFR BLD AUTO: 37.7 %
MAN DIFF?: NORMAL
MCHC RBC-ENTMCNC: 31.1 GM/DL
MCHC RBC-ENTMCNC: 31.4 PG
MCV RBC AUTO: 101.1 FL
MONOCYTES # BLD AUTO: 0.42 K/UL
MONOCYTES NFR BLD AUTO: 7.3 %
NEUTROPHILS # BLD AUTO: 2.91 K/UL
NEUTROPHILS NFR BLD AUTO: 50.3 %
PLATELET # BLD AUTO: 203 K/UL
POTASSIUM SERPL-SCNC: 4.3 MMOL/L
PROT SERPL-MCNC: 6.9 G/DL
RBC # BLD: 4.42 M/UL
RBC # FLD: 13.6 %
SODIUM SERPL-SCNC: 140 MMOL/L
WBC # FLD AUTO: 5.78 K/UL

## 2021-06-23 LAB
FRUCTOSAMINE SERPL-MCNC: 248 UMOL/L
TSH SERPL-ACNC: 2.91 UIU/ML

## 2021-06-23 NOTE — HISTORY OF PRESENT ILLNESS
[FreeTextEntry1] : Jun 16, 2021    in person \par \par   PCP: Dr. Izzy Vital\par             Urol: Dr. Hurd p  in [place] \par             Eyes: Dr. Twan Suresh Jr - cataract - 2/2021\par             GI: Dr. Oscar Laura- ? a few years ago - told of polyps \par             Derm:  Dr. Feli Husain and Carmen - psoriasis - recent leg Bx \par             Rheum:  Dr. Nicki Velázquez \par            .\par            CC: Underactive thyroid (TPO and Tg Ab negative) Dx: ~12/2015  Dr. Driver\par                          3/26/18 U/S Thyroid - negative \par             (Prediabetes: 3/2016 A1c 6.4 12/12/2017 A1c 6.1, 3/2018: OGTT peak 180) 1/2/2020 A1c 6.0 %\par             (B12 deficiency, B12 177, MCV elevated) \par             (spinal stenosis - Dr. Post) \par             (3/2017 - radical prostatectomy)\par             (6/2020 - asymptomatic Covid 19 positive ab) \par \par Visits for hypothyroidism and history of A1c up to 6.4 %\par \par : :\par Constitutional:  Alert, well nourished, healthy appearance, no acute distress \par Eyes:  No proptosis, no stare\par Thyroid:\par Pulmonary:  No respiratory distress, no accessory muscle use; normal rate and effort\par Cardiac:  Normal rate\par Vascular: \par Endocrine:  No stigmata of Cushing’s Syndrome\par Musculoskeletal:  Normal gait, no involuntary movements\par Neurology:  No tremors\par Affect/Mood/Psych:  Oriented x 3; normal affect, normal insight/judgement, normal mood \par .\par \par Impression:  Remains on levothyroxine 25 mcg daily.  December TSH 3.05\par B12 normal on OTC B12\par December A1c 6.0 %\par \par Plan:  Same Rx and ROV about 6 months.\par LT4 renewed. \par A1c, TSH today\par \par Dec 18, 2020    in person\par \par   PCP: Dr. Izzy Vital\par             Urol: Dr. Hurd p  in [place] \par             Eyes: Dr. Twan Suresh Jr (saw 3/2017 and again 1/2018)\par             GI: Dr. Oscar Laura- ? a few years ago - told of polyps \par             Derm:  Dr. Feli Husain - psoriasis\par             Rheum:  Dr. Nicki Velázquez \par            .\par            CC: Underactive thyroid (TPO and Tg Ab negative) Dx: ~12/2015  Dr. Driver\par                          3/26/18 U/S Thyroid - negative \par             (Prediabetes: 3/2016 A1c 6.4 12/12/2017 A1c 6.1, 3/2018: OGTT peak 180)\par             (B12 deficiency, B12 177, MCV elevated) \par             (spinal stenosis - Dr. Post) \par             (3/2017 - radical prostatectomy)\par             (6/2020 - asymptomatic Covid 19 positive ab) \par \par Visits for hypothyroidism and history of A1c up to 6.4 %\par June 2020 A1c was 6.1% and  mg/dl\par TSH 2.29 on LT4 25 mcg daily \par \par : :\par Constitutional:  Alert, well nourished, healthy appearance, no acute distress \par Eyes:  No proptosis, no stare\par Thyroid:\par Pulmonary:  No respiratory distress, no accessory muscle use; normal rate and effort\par Cardiac:  Normal rate\par Vascular: \par Endocrine:  No stigmata of Cushing’s Syndrome\par Musculoskeletal:  Normal gait, no involuntary movements\par Neurology:  No tremors\par Affect/Mood/Psych:  Oriented x 3; normal affect, normal insight/judgement, normal mood \par .\par  Impression:  Hypothyrodism well controlled.\par Diabetes:  diet controled.\par \par \par \par \par \par Pravin 15, 2020     in person\par \par   PCP: Dr. Izzy Vital\par             Urol: Dr. Hurd p  in [place] \par             Eyes: Dr. Twan Suresh Jr (saw 3/2017 and again 1/2018)\par             GI: Dr. Oscar Laura- ? a few years ago - told of polyps \par             Derm:  Dr. Feli Husain - psoriasis\par            .\par            CC: Underactive thyroid (TPO and Tg Ab negative) Dx: ~12/2015  Dr. Driver\par                          3/26/18 U/S Thyroid - negative \par             (Prediabetes: 3/2016 A1c 6.4 12/12/2017 A1c 6.1, 3/2018: OGTT peak 180)\par             (B12 deficiency, B12 177, MCV elevated) \par             (spinal stenosis - Dr. Post) \par             (3/2017 - radical prostatectomy)\par \par Visits for hypothyroidism and history of A1c up to 6.4 %\par Feels well.\par Walks almost every day, down to [place].\par Taking levothyroxine 25 mcg daily - initial Dx by Dr. Driver\par \par Impression:  Dong well.\par \par Plan:  TSH today, follow up A1c.\par ROV six months.  \par \par \par Dec 20, 2019\par \par   PCP: Dr. Izzy Vital\par             Urol: Dr. Hurd p  in [place] \par             Eyes: Dr. Twan Suresh Jr (saw 3/2017 and again 1/2018)\par             GI: Dr. Oscar Laura- ? a few years ago - told of polyps \par             Derm:  Dr. Feli Husain\par            .\par            CC: Underactive thyroid (TPO and Tg Ab negative) Dx: ~12/2015  Dr. Driver\par                          3/26/18 U/S Thyroid - negative \par             (Prediabetes: 3/2016 A1c 6.4 12/12/2017 A1c 6.1, 3/2018: OGTT peak 180)\par             (B12 deficiency, B12 177, MCV elevated) \par             (spinal stenosis - Dr. Post) \par             (3/2017 - radical prostatectomy)\par \par 68 yo visits for hypothyroidism, prediabetes.  Saw Dr. Vital today.\par On B12 for B12 deficiency.\par Levothyroxine 25 mcg for very early hypothyroidism.  \par \par Lab test in June 2019 iincluded\par TSH 2.84 (on 25 mcg of LT4)\par A1c 6.2 %\par \par \par Impression:  Doing well.\par Plan:  Review results of updated thyroid function tests, A1c, B12, folate.\par ROV several months.  \par \par \par \par \par \par \par Radha 3, 2019\par \par      .\par            PCP: Dr. Izzy Vital\par             Urol: Dr. Stacy Sotomayor\par             Eyes: Dr. Twan Suresh Jr (saw 3/2017 and again 1/2018)\par             GI: Dr. Oscar Laura- ? a few years ago - told of polyps \par             Derm:  Dr. Feli Husain\par            .\par            CC: Underactive thyroid (TPO and Tg Ab negative) Dx: ~12/2015  Dr. Driver\par                          3/26/18 U/S Thyroid - negative \par             (Prediabetes: 3/2016 A1c 6.4 12/12/2017 A1c 6.1, 3/2018: OGTT peak 180)\par             (B12 deficiency, B12 177)\par             (spinal stenosis - Dr. Post) \par             (3/2017 - radical prostatectomy)\par \par Lab tests from December 3, 2018 at [facility] included\par TSH 3.15\par HbA1c 6.0 %\par He reports that blood pressure has been running on the low side..\par \par His med list iincludes\par Losartan 50 mg daily\par Levothyroxine 25 mcg daily and\par Vitamin b12 1000 mcg daily.\par \par Impression: : doiing well\par TFTs today and ROV 6 months.   \par \par \par \par \par December 3, 2018\par \par            .\par            PCP: Dr. Izzy Vital\par             Urol: Dr. Stacy Sotomayor\par             Eyes: Dr. Demetrice Jr (saw 3/2017 and again 1/2018)\par             GI: Dr. Laura- ? a few years ago - told of polyps \par             Derm:  Dr. MELODIE Husain\par            .\par            CC: Underactive thyroid (TPO and Tg Ab negative)\par             3/26/18 U/S Thyroid - negative \par             (Prediabetes: 3/2016 A1c 6.4 12/12/2017 A1c 6.1, 3/2018: OGTT peak 180)\par             (B12 deficiency, B12 177)\par             (spinal stenosis - Dr. Post) \par             (3/2017 - radical prostatectomy)\par \par Has early hypothyroidism.  Initial Rx with levothyroxine did not go well:  he felt poorly and stopped.\par Now taking 25 mcg daily and tolerating it well.\par He was reluctant to take vitamin B12 for blood level of 177, but now takes it regularly.\par A1c as high as 6.4 % indicating prediabetes.\par .\par Imrpession:  He feels well and is doing well.\par Plan:  As arranged by Dr. Vital he will have A1c, TFTs checked today.\par ROV ~6 months.  \par \par \par Previous notes from [facility] appended below:\par \par \par  April 9, 2018\par            .\par            PCP: Dr. Izzy Vital\par             Urol: Dr. Stacy Sotomayor\par             Eyes: Dr. Demetrice Jr (saw 3/2017 and again 1/2018)\par             GI: Dr. Laura- ? a few years ago - told of polyps \par            .\par            CC: Underactive thyroid (TPO and Tg Ab negative)\par             3/26/18 U/S Thyroid - negative \par             (Prediabetes: 3/2016 A1c 6.4 12/12/2017 A1c 6.1, 3/2018: OGTT peak 180)\par             (B12 deficiency) \par             (spinal stenosis - Dr. Post) \par             (3/2017 - radical prostatectomy)\par            .\par            With some negotiating, patient agreed to take vitamin B12 1000 mcg daily and eventually agreed to take levothyroxine 25 mcg every other day. He is also on Losartan.\par            .\par            He recently saw Dr. Sotomayor and lab tests from 3/27/2018 include undetectable TSA.\par            Labs from 3/26/2018 show\par            TSH 3.62, down from 6.25 in December\par            December B12 level was 1119 \par            Ultrasound thryoid was very reassuring.\par            A1c had gone from 5.7 to 6.1 so he went for formal OGTT:\par            FBS wqs 105 and peak (2 hr) sugar was 180 - also consistant with\par            diagnosis of prediabetes.\par            .\par            Impression Early hypothyroidism well controlled on levothyroxine 25 mcg QOD. He is aware of diagnosis of prediabetes. As he is reluctant to do self blood glucose monitoring at this time, A1c once-twice a year and occasional fasting and random glucose levels should suffice for now\par            Encourage walking.\par            .\par            ROV here when he returns for CPX, likely in December. \par            .\par            .\par            ==\par            .\par            December 15, 2017\par            .\par            .\par            PCP: Dr. Izzy Vital\par             Urol: Dr. Stacy Sotomayor\par             Eyes: Dr. Demetrice Jr (saw 3/2017 and again 1/2018)\par             GI: Dr. Laura- ? a few years ago - told of polyps \par            .\par            CC: Underactive thyroid (TPO and Tg Ab negative)\par             (Prediabetes)\par             (B12 deficiency) \par             (spinal stenosis - Dr. Post) \par             (3/2017 - radical prostatectomy)\par            .\par            68 yo former [employer] employee.\par            .\par            He returns regarding early ("pre-") hypothyroidism and prediabetes.\par            He does not like to take medication. He has well documented B12 deficiency and it took a lot of convincing to get him to take po B12, but he does take it now.\par            He did try taking levothyroxine in the past, but did not like how it made him feel.\par            He knows he has prediabetes, but is not quite ready to start self blood glucose monitoring. \par            .\par            Lab tests (Rob) kindly provided by Dr. Vital from\par            12/12/2017 include\par            free T4 0.8\par            TSH 6.25 **\par            B12 1119\par            glucose 105 mg/dl (fasting)\par            A1c 6.1 % (5.7 in May)\par            .\par            Impression: Slowly stuttering into hypothyroidism.\par            He would also like ultrasound of thyroid. \par            .\par            Plan: He is willing to try 25 mcg levothyroxine. I will start him on it every other day. Ultrasound thyroid. \par            .\par            ROV in February. Will address the sugars then. Thank you. -\par            .\par            ==\par            .\par            May 8, 2017\par            .\par            PCP: Dr. Izzy Vital\par             Urol: Dr. Stacy Sotomayor\par            .\par            CC: Underactive thyroid. \par             (Prediabetes)\par             (B12 deficiency) \par             (spinal stenosis - Dr. Sejal) \par            .\par            Since last visit, underwent radical prostatectomy March 2017\par            Feels well.\par            .\par            November Thyroid and B12 levels in good range. \par            .\par            Plan: A1c, TSH.\par            ROV 8 months. \par            .\par            ==\par            .\par            November 21, 2016\par            .\par            PCP: Dr. Maik Driver\par            .\par            CC: Underactive thyroid. \par             (Prediabetes)\par             (B12 deficiency) \par             (spinal stenosis - Dr. Sejal) \par            .\par            On po B12, his B12 level went from 170's to 700's.\par            He stopped his thyroid pill.\par            His last A1c 6.4 % (pre-diabetes).\par            .\par            Plan: Check\par            B12\par            A1c\par            TSH\par            ROV 6-8 months.\par            .\par            .\par            ==\par            .\par            July 20, 2016\par            .\par            PCP: Dr. Maik Driver\par            .\par            CC: Underactive thyroid. \par             (Prediabetes)\par             (B12 deficiency) \par             (spinal stenosis - Dr. Sejal) \par            .\par            Because of L shoulder pain, recently saw Dr. Castro and received benefit from Voltanen 75 mg with Omeprazole \par            .\par            Labs in March included\par             mg/dl\par            TSH 2.65\par            B12 171 (before starting po B12) \par            HbA1c 6.4 % **\par            .\par            Impression: TSH is remaining within normal limits even though he chose not to take levothyroxine.\par            .\par            B12 is being treated. \par            .\par            A1c indicates pre-diabetes - counsedled today and in the future will consider monitoring glucose levels. \par            .\par            ==\par            .\par            May 19, 2016\par            .\par            PCP: Dr. Maik Driver\par            .\par            CC: Underactive thyroid. \par             (spinal stenosis - Dr. Sejal) \par            .\par            He did not like the thyroid medication, so he stopped it (!)\par            He is taking\par            Vitamin B12 1000 mcg daily and\par            Telmisartan (Micardis) 20 mg daily. \par            .\par            He went for lab tests (Rob)\par            3/17/2016\par            vitamin B12 171 *** (despite po B12 x 1 month)\par            TSH 2.65\par            .\par            .\par            Impression: "I feel pretty good...."\par            TSH has remained normal, even though he has stopped the\par            levothyroxine.\par            However, despite taking 1000 mcg of PO B12 for one month, the level is still low. \par            .\par            Plan: Advised him he may benefit from injectable B12\par            He will discuss with Dr. Driver. \par            .\par            ==\par            .\par            March 17, 2016\par            .\par            PCP: Dr. Maik Driver\par            .\par            CC: Underactive thyroid. \par             (spinal stenosis - Dr. Post) \par            .\par            Currently tolerating thyroid medication.\par            .\par            Plan: Continue to monitor TFTs on levothyroxine and monitor his symptoms. \par            .\par            ==\par            .\par            January 18, 2016\par            .\par            PCP: Dr. Maik Driver\par            .\par            CC: Underactive thyroid. \par             (spinal stenosis - Dr. Post) \par            .\par            Accompanied by his daughter, Sarah.\par            His wife, Osiris, visits here.\par            .\par            December he started on levothyroxine 50 mcg daily.\par            He noted sweating and dose changed to 75 mcg.\par            Took last levothyroxine a week ago and his leg and back pain and sweats went away. \par            He is on Telmisartan 20 mg daily for elevated blood pressure. \par            .\par            Impression: Reason for symptoms not clear.\par            .\par            Plan: Updated labs today and obtain old records (offices closed for [facility] Day). TOV 6-8 weeks.

## 2021-07-01 LAB — ERYTHROCYTE [SEDIMENTATION RATE] IN BLOOD BY WESTERGREN METHOD: 16 MM/HR

## 2021-07-01 NOTE — HISTORY OF PRESENT ILLNESS
[FreeTextEntry1] : He saw Dr. Vital this morning bc he was in the ER 2 days ago after his daughter removed a tik from his right eye.  She prescribed Doxy for prophylaxis.  He flushed it with water and it is no longer bothering him.  No blurry vision.\par \par He saw Dr. 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  these symptoms then resolved.\par He does note that his joints felt better while he was on it.\par \par He has some psoriasis on his right anterior shin and chest but they don’t itch.  Dr. Morales prescribed Halcinonide which he applied at night for a few days, but the lesions still linger.

## 2021-08-06 NOTE — HISTORY OF PRESENT ILLNESS
rolling walker
[FreeTextEntry8] : Pt with blood in the urine x 1 episode on Friday afternoon. He did not have any further episodes. He has a h/o prostate cancer. PSA last month was <0.01. Pt did have some back pain last week and then followed by groin pain. No h/o stone and does not feel like he passed a stone. No fever. When he had blood in the urine there was no pain.\par No other complaints. Not on any meds that would cause bleeding.

## 2021-08-10 ENCOUNTER — RX RENEWAL (OUTPATIENT)
Age: 71
End: 2021-08-10

## 2021-08-18 ENCOUNTER — APPOINTMENT (OUTPATIENT)
Dept: RHEUMATOLOGY | Facility: CLINIC | Age: 71
End: 2021-08-18
Payer: MEDICARE

## 2021-08-18 VITALS
HEIGHT: 61 IN | BODY MASS INDEX: 25.49 KG/M2 | SYSTOLIC BLOOD PRESSURE: 126 MMHG | DIASTOLIC BLOOD PRESSURE: 66 MMHG | WEIGHT: 135 LBS

## 2021-08-18 PROCEDURE — 99214 OFFICE O/P EST MOD 30 MIN: CPT | Mod: 25

## 2021-08-18 PROCEDURE — 36415 COLL VENOUS BLD VENIPUNCTURE: CPT

## 2021-08-25 NOTE — ASSESSMENT
[FreeTextEntry1] : Tolerating MTX, but still with active synovitis.  Recommend increase MTX to 6 tabs weekly.\par \par Booster COVID vaccine recommended due to immunosuppressed status.\par

## 2021-08-25 NOTE — HISTORY OF PRESENT ILLNESS
[FreeTextEntry1] : He has some pain in his right elbow and left shoulder.\par He started MTX 4 tabs without side effects.\par No new patches of psoriasis\par He has a small resolving lesion on his right leg.  No new lesions.  He no longer needs to use topical steroids.\par

## 2021-10-01 LAB
ALBUMIN SERPL ELPH-MCNC: 4.4 G/DL
ALP BLD-CCNC: 91 U/L
ALT SERPL-CCNC: 21 U/L
ANION GAP SERPL CALC-SCNC: 9 MMOL/L
AST SERPL-CCNC: 29 U/L
BASOPHILS # BLD AUTO: 0.05 K/UL
BASOPHILS NFR BLD AUTO: 0.7 %
BILIRUB SERPL-MCNC: 0.4 MG/DL
BUN SERPL-MCNC: 19 MG/DL
CALCIUM SERPL-MCNC: 9.4 MG/DL
CHLORIDE SERPL-SCNC: 105 MMOL/L
CO2 SERPL-SCNC: 26 MMOL/L
CREAT SERPL-MCNC: 0.87 MG/DL
CRP SERPL-MCNC: <3 MG/L
EOSINOPHIL # BLD AUTO: 0.29 K/UL
EOSINOPHIL NFR BLD AUTO: 4.2 %
ERYTHROCYTE [SEDIMENTATION RATE] IN BLOOD BY WESTERGREN METHOD: 8 MM/HR
GLUCOSE SERPL-MCNC: 97 MG/DL
HCT VFR BLD CALC: 41.9 %
HGB BLD-MCNC: 13.2 G/DL
IMM GRANULOCYTES NFR BLD AUTO: 0.3 %
LYMPHOCYTES # BLD AUTO: 2.78 K/UL
LYMPHOCYTES NFR BLD AUTO: 40.6 %
MAN DIFF?: NORMAL
MCHC RBC-ENTMCNC: 31.5 GM/DL
MCHC RBC-ENTMCNC: 31.9 PG
MCV RBC AUTO: 101.2 FL
MONOCYTES # BLD AUTO: 0.55 K/UL
MONOCYTES NFR BLD AUTO: 8 %
NEUTROPHILS # BLD AUTO: 3.15 K/UL
NEUTROPHILS NFR BLD AUTO: 46.2 %
PLATELET # BLD AUTO: 192 K/UL
POTASSIUM SERPL-SCNC: 4.5 MMOL/L
PROT SERPL-MCNC: 6.8 G/DL
RBC # BLD: 4.14 M/UL
RBC # FLD: 14.6 %
SODIUM SERPL-SCNC: 141 MMOL/L
WBC # FLD AUTO: 6.84 K/UL

## 2021-10-10 ENCOUNTER — RX RENEWAL (OUTPATIENT)
Age: 71
End: 2021-10-10

## 2021-11-04 ENCOUNTER — APPOINTMENT (OUTPATIENT)
Dept: GASTROENTEROLOGY | Facility: CLINIC | Age: 71
End: 2021-11-04
Payer: MEDICARE

## 2021-11-04 VITALS
WEIGHT: 135 LBS | SYSTOLIC BLOOD PRESSURE: 140 MMHG | BODY MASS INDEX: 25.49 KG/M2 | DIASTOLIC BLOOD PRESSURE: 70 MMHG | HEART RATE: 67 BPM | OXYGEN SATURATION: 96 % | HEIGHT: 61 IN | TEMPERATURE: 97 F

## 2021-11-04 PROCEDURE — 99203 OFFICE O/P NEW LOW 30 MIN: CPT

## 2021-11-04 RX ORDER — DOXYCYCLINE HYCLATE 100 MG/1
100 TABLET ORAL
Qty: 2 | Refills: 0 | Status: DISCONTINUED | COMMUNITY
Start: 2021-06-16 | End: 2021-11-04

## 2021-11-04 NOTE — REASON FOR VISIT
[Consultation] : a consultation visit [FreeTextEntry1] : Kindly asked by Dr. Vital  to consult and evaluate patient for  colon screening, GERD                  \par A copy of this note is being sent to physician requesting consultation.

## 2021-11-04 NOTE — CONSULT LETTER
[FreeTextEntry1] : Dear Dr. AMIE ENRIQUEZ ,\par \par I had the pleasure of evaluating your patient,  LATISHA WEBSTER.\par \par Please refer to my note below.\par \par Thank you very much for allowing me to participate in the care of this patient.  If you have any questions, please do not hesitate to contact me.\par \par Sincerely, \par \par Brooks Bauer MD\par

## 2021-11-04 NOTE — HISTORY OF PRESENT ILLNESS
[FreeTextEntry1] : 71m psoriatic arthritis/MTX/otezla, DM, GERD (LPR), presenting for colon cancer screening. Pt denies abdominal pain, rectal bleeding, change in bowel habits, or unexplained weight loss.  \par \par He has no heartburn.  He has GERD dx from ENT for LPR.  No dysphagia.  No wt loss.  No abd pain.\par \par Last colonoscopy:\par '15 w/ Dr. Laura - colon polyp, diverticulosis (also prior hx polyps)\par '15 EGD gastritis, no Roque's\par \par Soc:  no tobacco or significant EtOH\par FHx: no FHx GI malignancy or IBD\par \par ROS:\par Constitutional:: no weight loss, fevers\par ENT: no deafness\par Eyes: not blind\par Neck: no LN\par Chest: no dyspnea/cough\par Cardiac: no chest pain\par Vascular: no leg swelling\par GI: no abdominal pain, nausea, vomiting, diarrhea, constipation, rectal bleeding, dysphagia, melena unless otherwise noted in HPI\par : no dysuria, dark urine\par Skin: no rashes, jaundice\par Heme: no bleeding\par Endocrine: no DM unless otherwise stated in HPI\par \par Px: (VS noted below)\par General: NAD\par Eyes: anicteric\par Oropharynx:  clear\par Neck: no LN\par Chest: normal respiratory effort\par CVS: regular\par Abd: soft, NT, ND, +BS, no HSM\par Ext: no atrophy\par Neuro: grossly nonfocal\par \par Labs/imaging/prior endoscopic results reviewed to the extent available and noted in HPI\par \par

## 2021-11-04 NOTE — ASSESSMENT
[FreeTextEntry1] : - Colon cancer screening - colonoscopy due hx polyps - Colonoscopy scheduled - Risks, benefits, alternatives were discussed, including but not limited to bleeding, infection, perforation and sedation risks. Additionally, the possibility of missed lesions was conveyed.\par \par - GERD - LPR only -  Reviewed dietary and lifestyle modifications, including weight loss, smaller/frequent/earlier (>3h prior to lying down) meals, trials of cutting down/out caffeine, chocolate, tomatoes, fatty foods, alcohol.\par \par PMD/consultation/hospital notes and Labs/imaging/prior endoscopic results reviewed to extent noted in HPI; and, if procedure code billed on this visit for lab draw, this serves to signify that labs were drawn here in this office.\par

## 2021-11-07 ENCOUNTER — RESULT REVIEW (OUTPATIENT)
Age: 71
End: 2021-11-07

## 2021-11-08 ENCOUNTER — APPOINTMENT (OUTPATIENT)
Dept: GASTROENTEROLOGY | Facility: CLINIC | Age: 71
End: 2021-11-08

## 2021-11-10 ENCOUNTER — APPOINTMENT (OUTPATIENT)
Dept: GASTROENTEROLOGY | Facility: HOSPITAL | Age: 71
End: 2021-11-10

## 2021-11-16 ENCOUNTER — LABORATORY RESULT (OUTPATIENT)
Age: 71
End: 2021-11-16

## 2021-11-16 ENCOUNTER — APPOINTMENT (OUTPATIENT)
Dept: RHEUMATOLOGY | Facility: CLINIC | Age: 71
End: 2021-11-16
Payer: MEDICARE

## 2021-11-16 VITALS
HEART RATE: 68 BPM | WEIGHT: 134 LBS | BODY MASS INDEX: 25.3 KG/M2 | HEIGHT: 61 IN | OXYGEN SATURATION: 96 % | DIASTOLIC BLOOD PRESSURE: 80 MMHG | SYSTOLIC BLOOD PRESSURE: 122 MMHG

## 2021-11-16 PROCEDURE — 36415 COLL VENOUS BLD VENIPUNCTURE: CPT

## 2021-11-16 PROCEDURE — 99213 OFFICE O/P EST LOW 20 MIN: CPT | Mod: 25

## 2021-11-17 LAB
ALBUMIN SERPL ELPH-MCNC: 4.3 G/DL
ALP BLD-CCNC: 80 U/L
ALT SERPL-CCNC: 28 U/L
ANION GAP SERPL CALC-SCNC: 15 MMOL/L
AST SERPL-CCNC: 29 U/L
BASOPHILS # BLD AUTO: 0.05 K/UL
BASOPHILS NFR BLD AUTO: 0.8 %
BILIRUB SERPL-MCNC: 0.3 MG/DL
BUN SERPL-MCNC: 14 MG/DL
CALCIUM SERPL-MCNC: 9.5 MG/DL
CHLORIDE SERPL-SCNC: 102 MMOL/L
CO2 SERPL-SCNC: 22 MMOL/L
CREAT SERPL-MCNC: 0.84 MG/DL
CRP SERPL-MCNC: <3 MG/L
EOSINOPHIL # BLD AUTO: 0.26 K/UL
EOSINOPHIL NFR BLD AUTO: 4.1 %
ERYTHROCYTE [SEDIMENTATION RATE] IN BLOOD BY WESTERGREN METHOD: 13 MM/HR
GLUCOSE SERPL-MCNC: 101 MG/DL
HCT VFR BLD CALC: 41.6 %
HGB BLD-MCNC: 13.3 G/DL
IMM GRANULOCYTES NFR BLD AUTO: 0.3 %
LYMPHOCYTES # BLD AUTO: 2.26 K/UL
LYMPHOCYTES NFR BLD AUTO: 35.6 %
MAN DIFF?: NORMAL
MCHC RBC-ENTMCNC: 32 GM/DL
MCHC RBC-ENTMCNC: 33.7 PG
MCV RBC AUTO: 105.3 FL
MONOCYTES # BLD AUTO: 0.46 K/UL
MONOCYTES NFR BLD AUTO: 7.2 %
NEUTROPHILS # BLD AUTO: 3.3 K/UL
NEUTROPHILS NFR BLD AUTO: 52 %
PLATELET # BLD AUTO: 197 K/UL
POTASSIUM SERPL-SCNC: 4.1 MMOL/L
PROT SERPL-MCNC: 6.6 G/DL
RBC # BLD: 3.95 M/UL
RBC # FLD: 14.4 %
SODIUM SERPL-SCNC: 140 MMOL/L
WBC # FLD AUTO: 6.35 K/UL

## 2021-11-18 NOTE — HISTORY OF PRESENT ILLNESS
[FreeTextEntry1] : He increased MTX to 6 tabs weekly.  He notes improvement in his joints with the increased dose.\par Minimal morning stiffness.\par \par He had a routine screening colonoscopy last week.\par \par He had a Pfizer booster, flu and PNA vaccine.\par After the vaccine he got a dry patch on his right leg.  He is moisturizing twice a day.

## 2021-12-17 ENCOUNTER — APPOINTMENT (OUTPATIENT)
Dept: FAMILY MEDICINE | Facility: CLINIC | Age: 71
End: 2021-12-17
Payer: MEDICARE

## 2021-12-17 VITALS
HEIGHT: 61 IN | WEIGHT: 134 LBS | BODY MASS INDEX: 25.3 KG/M2 | HEART RATE: 72 BPM | TEMPERATURE: 97.9 F | RESPIRATION RATE: 16 BRPM | OXYGEN SATURATION: 96 % | DIASTOLIC BLOOD PRESSURE: 80 MMHG | SYSTOLIC BLOOD PRESSURE: 132 MMHG
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
## 2021-12-17 LAB
BILIRUB UR QL STRIP: NORMAL
CLARITY UR: CLEAR
COLLECTION METHOD: NORMAL
GLUCOSE UR-MCNC: NORMAL
HCG UR QL: 0.2 EU/DL
HGB UR QL STRIP.AUTO: NORMAL
KETONES UR-MCNC: NORMAL
LEUKOCYTE ESTERASE UR QL STRIP: NORMAL
NITRITE UR QL STRIP: NORMAL
PH UR STRIP: 5
PROT UR STRIP-MCNC: 100
SP GR UR STRIP: 1.03

## 2021-12-17 PROCEDURE — 81003 URINALYSIS AUTO W/O SCOPE: CPT | Mod: QW

## 2021-12-17 PROCEDURE — 99214 OFFICE O/P EST MOD 30 MIN: CPT | Mod: 25
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
## 2021-12-20 LAB
BACTERIA UR CULT: NORMAL
PSA FREE FLD-MCNC: NORMAL %
PSA FREE SERPL-MCNC: <0.01 NG/ML
PSA SERPL-MCNC: <0.01 NG/ML

## 2021-12-20 NOTE — PHYSICAL EXAM
[Normal] : no respiratory distress, lungs were clear to auscultation bilaterally and no accessory muscle use [No CVA Tenderness] : no CVA  tenderness

## 2021-12-20 NOTE — HISTORY OF PRESENT ILLNESS
[FreeTextEntry8] : Pt is a 72 y/o M that presents to clinic c/o burning with urination for 1 day. No fever. Mild back pain on the right side. \par He had his prostate removed 5 years ago and has a yearly f/u. Is due to go 3/2022. Would like to have PSA checked.

## 2022-01-04 ENCOUNTER — NON-APPOINTMENT (OUTPATIENT)
Age: 72
End: 2022-01-04

## 2022-01-04 ENCOUNTER — APPOINTMENT (OUTPATIENT)
Dept: ENDOCRINOLOGY | Facility: CLINIC | Age: 72
End: 2022-01-04
Payer: MEDICARE

## 2022-01-04 ENCOUNTER — APPOINTMENT (OUTPATIENT)
Dept: INTERNAL MEDICINE | Facility: CLINIC | Age: 72
End: 2022-01-04
Payer: MEDICARE

## 2022-01-04 ENCOUNTER — LABORATORY RESULT (OUTPATIENT)
Age: 72
End: 2022-01-04

## 2022-01-04 VITALS
DIASTOLIC BLOOD PRESSURE: 78 MMHG | OXYGEN SATURATION: 98 % | WEIGHT: 134 LBS | HEART RATE: 65 BPM | HEIGHT: 61 IN | BODY MASS INDEX: 25.3 KG/M2 | SYSTOLIC BLOOD PRESSURE: 135 MMHG

## 2022-01-04 VITALS
TEMPERATURE: 97.9 F | BODY MASS INDEX: 25.3 KG/M2 | DIASTOLIC BLOOD PRESSURE: 64 MMHG | WEIGHT: 134 LBS | HEIGHT: 61 IN | SYSTOLIC BLOOD PRESSURE: 126 MMHG

## 2022-01-04 DIAGNOSIS — E11.9 TYPE 2 DIABETES MELLITUS W/OUT COMPLICATIONS: ICD-10-CM

## 2022-01-04 PROCEDURE — G0444 DEPRESSION SCREEN ANNUAL: CPT | Mod: 59

## 2022-01-04 PROCEDURE — G0439: CPT

## 2022-01-04 PROCEDURE — 99214 OFFICE O/P EST MOD 30 MIN: CPT

## 2022-01-04 PROCEDURE — 93000 ELECTROCARDIOGRAM COMPLETE: CPT | Mod: 59

## 2022-01-04 PROCEDURE — 36415 COLL VENOUS BLD VENIPUNCTURE: CPT

## 2022-01-04 PROCEDURE — 99213 OFFICE O/P EST LOW 20 MIN: CPT | Mod: 25

## 2022-01-04 RX ORDER — NITROFURANTOIN (MONOHYDRATE/MACROCRYSTALS) 25; 75 MG/1; MG/1
100 CAPSULE ORAL
Qty: 10 | Refills: 0 | Status: DISCONTINUED | COMMUNITY
Start: 2021-12-17 | End: 2022-01-04

## 2022-01-04 RX ORDER — APREMILAST 30 MG/1
30 TABLET, FILM COATED ORAL
Qty: 180 | Refills: 4 | Status: DISCONTINUED | COMMUNITY
Start: 2021-04-24 | End: 2022-01-04

## 2022-01-04 NOTE — DATA REVIEWED
[FreeTextEntry1] : reviewed last labs and endo and rheum visits as well as sick visit with Dr Elizondo

## 2022-01-04 NOTE — PHYSICAL EXAM
[Normal Sclera/Conjunctiva] : normal sclera/conjunctiva [EOMI] : extraocular movements intact [Normal Outer Ear/Nose] : the outer ears and nose were normal in appearance [Normal TMs] : both tympanic membranes were normal [No JVD] : no jugular venous distention [No Lymphadenopathy] : no lymphadenopathy [Supple] : supple [No Carotid Bruits] : no carotid bruits [Pedal Pulses Present] : the pedal pulses are present [No Edema] : there was no peripheral edema [Normal Posterior Cervical Nodes] : no posterior cervical lymphadenopathy [Normal Anterior Cervical Nodes] : no anterior cervical lymphadenopathy [No Rash] : no rash [Normal] : affect was normal and insight and judgment were intact [de-identified] : numerous moles and skin lesions

## 2022-01-04 NOTE — HISTORY OF PRESENT ILLNESS
[FreeTextEntry1] : Jan 04, 2022    in person\par \par   PCP: Dr. Izzy Vital\par             Urol: Dr. Hurd p  in [facility]\par             Eyes: Dr. Twan Suresh Jr - cataract - 2/2021\par             GI: Dr. Oscar Laura- ? a few years ago - told of polyps \par             Derm:  Dr. Feli Husain and Carmen - psoriasis - recent leg Bx \par             Rheum:  Dr. Nicki Velázquez \par            .\par            CC: Underactive thyroid (TPO and Tg Ab negative) Dx: ~12/2015  Dr. Driver\par                          3/26/18 U/S Thyroid - negative \par             (Prediabetes: 3/2016 A1c 6.4 12/12/2017 A1c 6.1, 3/2018: OGTT peak 180) 1/2/2020 A1c 6.0 %\par             (B12 deficiency, B12 177, MCV elevated) \par             (spinal stenosis - Dr. Post) \par             (3/2017 - radical prostatectomy)\par             (6/2020 - asymptomatic Covid 19 positive ab) \par \par Visits for underactive thyroid, pre-diabetes.\par Medication list he brings shows:\par levothyroxine 25 mcg daily\par B12 1000 mg daily\par folic acid 1 mg daily\par Losartan 25 mg daily\par famotidine 40 mg HS\par methotrexate 2.5 mg - 6 tabs once a week \par \par In June 2021, fructosamine within normal range.  \par TSH 2.91\par \par Impression:  Prediabetes and early hypothyroidism well controlled.\par Plan:  Diet Rx of prediabetes; updated A1c, BS today.\par TSH today.\par ROV in about six months.\par \par \par \par Jun 16, 2021    in person \par \par   PCP: Dr. Izzy Vital\par             Urol: Dr. Hurd p  in [place] \par             Eyes: Dr. Twan Suresh,  - cataract - 2/2021\par             GI: Dr. Oscar Laura- ? a few years ago - told of polyps \par             Derm:  Dr. Feli Husain and Carmen - psoriasis - recent leg Bx \par             Rheum:  Dr. Nicki Velázquez \par            .\par            CC: Underactive thyroid (TPO and Tg Ab negative) Dx: ~12/2015  Dr. Driver\par                          3/26/18 U/S Thyroid - negative \par             (Prediabetes: 3/2016 A1c 6.4 12/12/2017 A1c 6.1, 3/2018: OGTT peak 180) 1/2/2020 A1c 6.0 %\par             (B12 deficiency, B12 177, MCV elevated) \par             (spinal stenosis - Dr. Post) \par             (3/2017 - radical prostatectomy)\par             (6/2020 - asymptomatic Covid 19 positive ab) \par \par Visits for hypothyroidism and history of A1c up to 6.4 %\par \par : :\par Constitutional:  Alert, well nourished, healthy appearance, no acute distress \par Eyes:  No proptosis, no stare\par Thyroid:\par Pulmonary:  No respiratory distress, no accessory muscle use; normal rate and effort\par Cardiac:  Normal rate\par Vascular: \par Endocrine:  No stigmata of Cushing’s Syndrome\par Musculoskeletal:  Normal gait, no involuntary movements\par Neurology:  No tremors\par Affect/Mood/Psych:  Oriented x 3; normal affect, normal insight/judgement, normal mood \par .\par \par Impression:  Remains on levothyroxine 25 mcg daily.  December TSH 3.05\par B12 normal on OTC B12\par December A1c 6.0 %\par \par Plan:  Same Rx and ROV about 6 months.\par LT4 renewed. \par A1c, TSH today\par \par Dec 18, 2020    in person\par \par   PCP: Dr. Izzy Vital\par             Urol: Dr. Hurd p  in [place] \par             Eyes: Dr. Twan Suresh Jr (saw 3/2017 and again 1/2018)\par             GI: Dr. Oscar Laura- ? a few years ago - told of polyps \par             Derm:  Dr. Feli Husain - psoriasis\par             Rheum:  Dr. Nicki Velázquez \par            .\par            CC: Underactive thyroid (TPO and Tg Ab negative) Dx: ~12/2015  Dr. Driver\par                          3/26/18 U/S Thyroid - negative \par             (Prediabetes: 3/2016 A1c 6.4 12/12/2017 A1c 6.1, 3/2018: OGTT peak 180)\par             (B12 deficiency, B12 177, MCV elevated) \par             (spinal stenosis - Dr. Post) \par             (3/2017 - radical prostatectomy)\par             (6/2020 - asymptomatic Covid 19 positive ab) \par \par Visits for hypothyroidism and history of A1c up to 6.4 %\par June 2020 A1c was 6.1% and  mg/dl\par TSH 2.29 on LT4 25 mcg daily \par \par : :\par Constitutional:  Alert, well nourished, healthy appearance, no acute distress \par Eyes:  No proptosis, no stare\par Thyroid:\par Pulmonary:  No respiratory distress, no accessory muscle use; normal rate and effort\par Cardiac:  Normal rate\par Vascular: \par Endocrine:  No stigmata of Cushing’s Syndrome\par Musculoskeletal:  Normal gait, no involuntary movements\par Neurology:  No tremors\par Affect/Mood/Psych:  Oriented x 3; normal affect, normal insight/judgement, normal mood \par .\par  Impression:  Hypothyrodism well controlled.\par Diabetes:  diet controled.\par \par \par \par \par \par Pravin 15, 2020     in person\par \par   PCP: Dr. Izzy Vital\par             Urol: Dr. Hurd p  in [place] \par             Eyes: Dr. Twan Suresh Jr (saw 3/2017 and again 1/2018)\par             GI: Dr. Oscar Laura- ? a few years ago - told of polyps \par             Derm:  Dr. Feli Husain - psoriasis\par            .\par            CC: Underactive thyroid (TPO and Tg Ab negative) Dx: ~12/2015  Dr. Driver\par                          3/26/18 U/S Thyroid - negative \par             (Prediabetes: 3/2016 A1c 6.4 12/12/2017 A1c 6.1, 3/2018: OGTT peak 180)\par             (B12 deficiency, B12 177, MCV elevated) \par             (spinal stenosis - Dr. Post) \par             (3/2017 - radical prostatectomy)\par \par Visits for hypothyroidism and history of A1c up to 6.4 %\par Feels well.\par Walks almost every day, down to [place].\par Taking levothyroxine 25 mcg daily - initial Dx by Dr. Driver\par \par Impression:  Dong well.\par \par Plan:  TSH today, follow up A1c.\par ROV six months.  \par \par \par Dec 20, 2019\par \par   PCP: Dr. Izzy Vital\par             Urol: Dr. Hurd p  in [place] \par             Eyes: Dr. Twan Suresh Jr (saw 3/2017 and again 1/2018)\par             GI: Dr. Oscar Laura- ? a few years ago - told of polyps \par             Derm:  Dr. Feli Husain\par            .\par            CC: Underactive thyroid (TPO and Tg Ab negative) Dx: ~12/2015  Dr. Driver\par                          3/26/18 U/S Thyroid - negative \par             (Prediabetes: 3/2016 A1c 6.4 12/12/2017 A1c 6.1, 3/2018: OGTT peak 180)\par             (B12 deficiency, B12 177, MCV elevated) \par             (spinal stenosis - Dr. Post) \par             (3/2017 - radical prostatectomy)\par \par 68 yo visits for hypothyroidism, prediabetes.  Saw Dr. Vital today.\par On B12 for B12 deficiency.\par Levothyroxine 25 mcg for very early hypothyroidism.  \par \par Lab test in June 2019 iincluded\par TSH 2.84 (on 25 mcg of LT4)\par A1c 6.2 %\par \par \par Impression:  Doing well.\par Plan:  Review results of updated thyroid function tests, A1c, B12, folate.\par ROV several months.  \par \par \par \par \par \par \par Radha 3, 2019\par \par      .\par            PCP: Dr. Izzy Vital\par             Urol: Dr. Stacy Sotomayor\par             Eyes: Dr. Twan Suresh Jr (saw 3/2017 and again 1/2018)\par             GI: Dr. Oscar Laura- ? a few years ago - told of polyps \par             Derm:  Dr. Feli Husain\par            .\par            CC: Underactive thyroid (TPO and Tg Ab negative) Dx: ~12/2015  Dr. Driver\par                          3/26/18 U/S Thyroid - negative \par             (Prediabetes: 3/2016 A1c 6.4 12/12/2017 A1c 6.1, 3/2018: OGTT peak 180)\par             (B12 deficiency, B12 177)\par             (spinal stenosis - Dr. Post) \par             (3/2017 - radical prostatectomy)\par \par Lab tests from December 3, 2018 at [facility] included\par TSH 3.15\par HbA1c 6.0 %\par He reports that blood pressure has been running on the low side..\par \par His med list iincludes\par Losartan 50 mg daily\par Levothyroxine 25 mcg daily and\par Vitamin b12 1000 mcg daily.\par \par Impression: : doiing well\par TFTs today and ROV 6 months.   \par \par \par \par \par December 3, 2018\par \par            .\par            PCP: Dr. Izzy Vital\par             Urol: Dr. Stacy Sotomayor\par             Eyes: Dr. Demetrice Jr (saw 3/2017 and again 1/2018)\par             GI: Dr. Laura- ? a few years ago - told of polyps \par             Derm:  Dr. MELODIE Husain\par            .\par            CC: Underactive thyroid (TPO and Tg Ab negative)\par             3/26/18 U/S Thyroid - negative \par             (Prediabetes: 3/2016 A1c 6.4 12/12/2017 A1c 6.1, 3/2018: OGTT peak 180)\par             (B12 deficiency, B12 177)\par             (spinal stenosis - Dr. Post) \par             (3/2017 - radical prostatectomy)\par \par Has early hypothyroidism.  Initial Rx with levothyroxine did not go well:  he felt poorly and stopped.\par Now taking 25 mcg daily and tolerating it well.\par He was reluctant to take vitamin B12 for blood level of 177, but now takes it regularly.\par A1c as high as 6.4 % indicating prediabetes.\par .\par Imrpession:  He feels well and is doing well.\par Plan:  As arranged by Dr. Vital he will have A1c, TFTs checked today.\par ROV ~6 months.  \par \par \par Previous notes from [facility] appended below:\par \par \par  April 9, 2018\par            .\par            PCP: Dr. Izzy Vital\par             Urol: Dr. Stacy Sotomayor\par             Eyes: Dr. Demetrice Jr (saw 3/2017 and again 1/2018)\par             GI: Dr. Laura- ? a few years ago - told of polyps \par            .\par            CC: Underactive thyroid (TPO and Tg Ab negative)\par             3/26/18 U/S Thyroid - negative \par             (Prediabetes: 3/2016 A1c 6.4 12/12/2017 A1c 6.1, 3/2018: OGTT peak 180)\par             (B12 deficiency) \par             (spinal stenosis - Dr. Post) \par             (3/2017 - radical prostatectomy)\par            .\par            With some negotiating, patient agreed to take vitamin B12 1000 mcg daily and eventually agreed to take levothyroxine 25 mcg every other day. He is also on Losartan.\par            .\par            He recently saw Dr. Sotomayor and lab tests from 3/27/2018 include undetectable TSA.\par            Labs from 3/26/2018 show\par            TSH 3.62, down from 6.25 in December\par            December B12 level was 1119 \par            Ultrasound thryoid was very reassuring.\par            A1c had gone from 5.7 to 6.1 so he went for formal OGTT:\par            FBS wqs 105 and peak (2 hr) sugar was 180 - also consistant with\par            diagnosis of prediabetes.\par            .\par            Impression Early hypothyroidism well controlled on levothyroxine 25 mcg QOD. He is aware of diagnosis of prediabetes. As he is reluctant to do self blood glucose monitoring at this time, A1c once-twice a year and occasional fasting and random glucose levels should suffice for now\par            Encourage walking.\par            .\par            ROV here when he returns for CPX, likely in December. \par            .\par            .\par            ==\par            .\par            December 15, 2017\par            .\par            .\par            PCP: Dr. Izzy Vital\par             Urol: Dr. Stacy Sotomayor\par             Eyes: Dr. Demetrice Jr (saw 3/2017 and again 1/2018)\par             GI: Dr. Laura- ? a few years ago - told of polyps \par            .\par            CC: Underactive thyroid (TPO and Tg Ab negative)\par             (Prediabetes)\par             (B12 deficiency) \par             (spinal stenosis - Dr. Post) \par             (3/2017 - radical prostatectomy)\par            .\par            68 yo former [employer] employee.\par            .\par            He returns regarding early ("pre-") hypothyroidism and prediabetes.\par            He does not like to take medication. He has well documented B12 deficiency and it took a lot of convincing to get him to take po B12, but he does take it now.\par            He did try taking levothyroxine in the past, but did not like how it made him feel.\par            He knows he has prediabetes, but is not quite ready to start self blood glucose monitoring. \par            .\par            Lab tests (Rob) kindly provided by Dr. Vital from\par            12/12/2017 include\par            free T4 0.8\par            TSH 6.25 **\par            B12 1119\par            glucose 105 mg/dl (fasting)\par            A1c 6.1 % (5.7 in May)\par            .\par            Impression: Slowly stuttering into hypothyroidism.\par            He would also like ultrasound of thyroid. \par            .\par            Plan: He is willing to try 25 mcg levothyroxine. I will start him on it every other day. Ultrasound thyroid. \par            .\par            ROV in February. Will address the sugars then. Thank you. -\par            .\par            ==\par            .\par            May 8, 2017\par            .\par            PCP: Dr. Izzy Vital\par             Urol: Dr. Stacy Sotomayor\par            .\par            CC: Underactive thyroid. \par             (Prediabetes)\par             (B12 deficiency) \par             (spinal stenosis - Dr. Post) \par            .\par            Since last visit, underwent radical prostatectomy March 2017\par            Feels well.\par            .\par            November Thyroid and B12 levels in good range. \par            .\par            Plan: A1c, TSH.\par            ROV 8 months. \par            .\par            ==\par            .\par            November 21, 2016\par            .\par            PCP: Dr. Maik Driver\par            .\par            CC: Underactive thyroid. \par             (Prediabetes)\par             (B12 deficiency) \par             (spinal stenosis - Dr. Post) \par            .\par            On po B12, his B12 level went from 170's to 700's.\par            He stopped his thyroid pill.\par            His last A1c 6.4 % (pre-diabetes).\par            .\par            Plan: Check\par            B12\par            A1c\par            TSH\par            ROV 6-8 months.\par            .\par            .\par            ==\par            .\par            July 20, 2016\par            .\par            PCP: Dr. Maik Driver\par            .\par            CC: Underactive thyroid. \par             (Prediabetes)\par             (B12 deficiency) \par             (spinal stenosis - Dr. Rileyso) \par            .\par            Because of L shoulder pain, recently saw Dr. Castro and received benefit from Voltanen 75 mg with Omeprazole \par            .\par            Labs in March included\par             mg/dl\par            TSH 2.65\par            B12 171 (before starting po B12) \par            HbA1c 6.4 % **\par            .\par            Impression: TSH is remaining within normal limits even though he chose not to take levothyroxine.\par            .\par            B12 is being treated. \par            .\par            A1c indicates pre-diabetes - counsedled today and in the future will consider monitoring glucose levels. \par            .\par            ==\par            .\par            May 19, 2016\par            .\par            PCP: Dr. Maik Driver\par            .\par            CC: Underactive thyroid. \par             (spinal stenosis - Dr. Post) \par            .\par            He did not like the thyroid medication, so he stopped it (!)\par            He is taking\par            Vitamin B12 1000 mcg daily and\par            Telmisartan (Micardis) 20 mg daily. \par            .\par            He went for lab tests (Rob)\par            3/17/2016\par            vitamin B12 171 *** (despite po B12 x 1 month)\par            TSH 2.65\par            .\par            .\par            Impression: "I feel pretty good...."\par            TSH has remained normal, even though he has stopped the\par            levothyroxine.\par            However, despite taking 1000 mcg of PO B12 for one month, the level is still low. \par            .\par            Plan: Advised him he may benefit from injectable B12\par            He will discuss with Dr. Driver. \par            .\par            ==\par            .\par            March 17, 2016\par            .\par            PCP: Dr. Maik Driver\par            .\par            CC: Underactive thyroid. \par             (spinal stenosis - Dr. Post) \par            .\par            Currently tolerating thyroid medication.\par            .\par            Plan: Continue to monitor TFTs on levothyroxine and monitor his symptoms. \par            .\par            ==\par            .\par            January 18, 2016\par            .\par            PCP: Dr. Maik Driver\par            .\par            CC: Underactive thyroid. \par             (spinal stenosis - Dr. Post) \par            .\par            Accompanied by his daughter, Sarah.\par            His wife, Osiris, visits here.\par            .\par            December he started on levothyroxine 50 mcg daily.\par            He noted sweating and dose changed to 75 mcg.\par            Took last levothyroxine a week ago and his leg and back pain and sweats went away. \par            He is on Telmisartan 20 mg daily for elevated blood pressure. \par            .\par            Impression: Reason for symptoms not clear.\par            .\par            Plan: Updated labs today and obtain old records (offices closed for [facility] Day). TOV 6-8 weeks.

## 2022-01-04 NOTE — HISTORY OF PRESENT ILLNESS
[PMH Reviewed and Updated] : past medical history reviewed and updated [PSH Reviewed and Updated] : past surgical history reviewed and updated [Family History Reviewed and Updated] : family history reviewed and updated [Medication and Allergies Reconciled] : medication and allergies reconciled [0] : 0 [Retired] : retired from work [None] : The patient has no concerns about alcohol abuse [Never] : has never used illicit drugs [Walking] : walking [Stretching/Yoga/Pilates] : stretching/Yoga/Pilates [Compliant with medications] : compliant with medications [Spouse] : spouse [Children] : children [Extended Family] : extended family [Fully Independent] : fully independent [Drives without concerns] : drives without concerns [No history of falls] : no history of falls [Seatbelts] : seatbelts [Bicycle Helmet] : bicycle helmet [Motorcycle Helmet] : motorcycle helmet [Safe Driving Habits] : safe driving habits [Smoke Detectors] : smoke detectors [Carbon Monoxide Detector] : carbon monoxide detector [Hot Water Temp <120F] : hot water temp <120F [Fall Prevention Measures] : fall prevention measures [Sunscreen] : sunscreen [Patient Has Full Capacity] : this patient has full decision making capacity for discussion of advance care planning [Patient Has Designated Health Care Proxy/Advanced Directive] : patient has designated Health Care Proxy/Advanced Directive [de-identified] : Pt here for medicare annual visit. He is doing well thus far. No specific complaints. LTwo weeks ago he had some pain on urination. He saw Dr Elizondo and was treated for UTI. Currently asymptomatic but he is worried as there was microscopic blood on the urine dip.He would like to be rechecked.\par Pt exercises regularly. No chest pain or unusual SOB.\par  [Over the Past 2 Weeks, Have You Felt Down, Depressed, or Hopeless?] : 1.) Over the past 2 weeks, have you felt down, depressed, or hopeless? No [Over the Past 2 Weeks, Have You Felt Little Interest or Pleasure Doing Things?] : 2.) Over the past 2 weeks, have you felt little interest or pleasure doing things? No [Bathroom Grab Bars] : not using bathroom grab bars [Gun Trigger Locks] : not using gun trigger locks [Gun Safe] : not using a gun safe [CPR Training for Household] : did not receive CPR training for patient [CPR Training for Patient] : did not receive CPR training for patient [FreeTextEntry2] : none [de-identified] : none [de-identified] : none [FreeTextEntry1] : regular diet

## 2022-01-04 NOTE — HEALTH RISK ASSESSMENT
[Good] : ~his/her~  mood as  good [Never] : Never [No] : No [No falls in past year] : Patient reported no falls in the past year [0] : 2) Feeling down, depressed, or hopeless: Not at all (0) [PHQ-2 Negative - No further assessment needed] : PHQ-2 Negative - No further assessment needed [Patient reported colonoscopy was normal] : Patient reported colonoscopy was normal [HIV test declined] : HIV test declined [Hepatitis C test declined] : Hepatitis C test declined [None] : None [With Significant Other] : lives with significant other [With Family] : lives with family [Retired] : retired [] :  [Fully functional (bathing, dressing, toileting, transferring, walking, feeding)] : Fully functional (bathing, dressing, toileting, transferring, walking, feeding) [Fully functional (using the telephone, shopping, preparing meals, housekeeping, doing laundry, using] : Fully functional and needs no help or supervision to perform IADLs (using the telephone, shopping, preparing meals, housekeeping, doing laundry, using transportation, managing medications and managing finances) [[id number]] : 0 [Change in mental status noted] : No change in mental status noted [Reports changes in hearing] : Reports no changes in hearing [Reports changes in vision] : Reports no changes in vision [Reports changes in dental health] : Reports no changes in dental health [ColonoscopyDate] : 11/21 [ColonoscopyComments] : Dr aviles [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

## 2022-01-05 LAB
ALBUMIN SERPL ELPH-MCNC: 4.4 G/DL
ALP BLD-CCNC: 92 U/L
ALT SERPL-CCNC: 31 U/L
ANION GAP SERPL CALC-SCNC: 12 MMOL/L
APPEARANCE: ABNORMAL
AST SERPL-CCNC: 28 U/L
BASOPHILS # BLD AUTO: 0.05 K/UL
BASOPHILS NFR BLD AUTO: 0.9 %
BILIRUB SERPL-MCNC: 0.3 MG/DL
BILIRUBIN URINE: NEGATIVE
BLOOD URINE: NORMAL
BUN SERPL-MCNC: 15 MG/DL
CALCIUM SERPL-MCNC: 9.6 MG/DL
CHLORIDE SERPL-SCNC: 105 MMOL/L
CO2 SERPL-SCNC: 23 MMOL/L
COLOR: YELLOW
CREAT SERPL-MCNC: 0.97 MG/DL
EOSINOPHIL # BLD AUTO: 0.32 K/UL
EOSINOPHIL NFR BLD AUTO: 5.9 %
GLUCOSE QUALITATIVE U: NEGATIVE
GLUCOSE SERPL-MCNC: 105 MG/DL
HCT VFR BLD CALC: 41 %
HGB BLD-MCNC: 13.1 G/DL
IMM GRANULOCYTES NFR BLD AUTO: 0.4 %
KETONES URINE: NEGATIVE
LEUKOCYTE ESTERASE URINE: NEGATIVE
LYMPHOCYTES # BLD AUTO: 1.8 K/UL
LYMPHOCYTES NFR BLD AUTO: 33.1 %
MAN DIFF?: NORMAL
MCHC RBC-ENTMCNC: 32 GM/DL
MCHC RBC-ENTMCNC: 33.3 PG
MCV RBC AUTO: 104.3 FL
MONOCYTES # BLD AUTO: 0.41 K/UL
MONOCYTES NFR BLD AUTO: 7.5 %
NEUTROPHILS # BLD AUTO: 2.84 K/UL
NEUTROPHILS NFR BLD AUTO: 52.2 %
NITRITE URINE: NEGATIVE
PH URINE: 5.5
PLATELET # BLD AUTO: 251 K/UL
POTASSIUM SERPL-SCNC: 4.4 MMOL/L
PROT SERPL-MCNC: 6.9 G/DL
PROTEIN URINE: NORMAL
RBC # BLD: 3.93 M/UL
RBC # FLD: 13.8 %
SODIUM SERPL-SCNC: 141 MMOL/L
SPECIFIC GRAVITY URINE: 1.02
UROBILINOGEN URINE: NORMAL
WBC # FLD AUTO: 5.44 K/UL

## 2022-02-15 ENCOUNTER — APPOINTMENT (OUTPATIENT)
Dept: RHEUMATOLOGY | Facility: CLINIC | Age: 72
End: 2022-02-15
Payer: MEDICARE

## 2022-02-15 ENCOUNTER — LABORATORY RESULT (OUTPATIENT)
Age: 72
End: 2022-02-15

## 2022-02-15 VITALS
OXYGEN SATURATION: 95 % | HEIGHT: 61 IN | BODY MASS INDEX: 26.43 KG/M2 | DIASTOLIC BLOOD PRESSURE: 76 MMHG | WEIGHT: 140 LBS | SYSTOLIC BLOOD PRESSURE: 130 MMHG | HEART RATE: 74 BPM

## 2022-02-15 PROCEDURE — 36415 COLL VENOUS BLD VENIPUNCTURE: CPT

## 2022-02-15 PROCEDURE — 99213 OFFICE O/P EST LOW 20 MIN: CPT | Mod: 25

## 2022-02-17 NOTE — HISTORY OF PRESENT ILLNESS
[FreeTextEntry1] : He increased MTX to 6 tabs weekly.  He has pain in his right knee now when descending stairs.  His left knee no longer hurts.  No other joint pain or swelling.  No morning stiffness.\par \par In December he developed burning with urination.  He saw Dr. Elizondo who sent a UA which showed a UTI and he was treated with Nitrofurantoin.  When he had a physical with Dr. Vital she repeated the urine which showed no infection but he still had blood  he has a history of prostatectomy due to prostate cancer.  he has an appointment with a urologist later this afternoon.\par \par He had a Pfizer booster Oct 2021.\par

## 2022-03-21 LAB
ALBUMIN SERPL ELPH-MCNC: 4.4 G/DL
ALP BLD-CCNC: 85 U/L
ALT SERPL-CCNC: 17 U/L
ANION GAP SERPL CALC-SCNC: 12 MMOL/L
AST SERPL-CCNC: 27 U/L
BASOPHILS # BLD AUTO: 0.06 K/UL
BASOPHILS NFR BLD AUTO: 1.1 %
BILIRUB SERPL-MCNC: 0.4 MG/DL
BUN SERPL-MCNC: 12 MG/DL
CALCIUM SERPL-MCNC: 9.5 MG/DL
CHLORIDE SERPL-SCNC: 104 MMOL/L
CO2 SERPL-SCNC: 26 MMOL/L
CREAT SERPL-MCNC: 0.95 MG/DL
CRP SERPL-MCNC: <3 MG/L
EOSINOPHIL # BLD AUTO: 0.23 K/UL
EOSINOPHIL NFR BLD AUTO: 4.3 %
ERYTHROCYTE [SEDIMENTATION RATE] IN BLOOD BY WESTERGREN METHOD: 8 MM/HR
GLUCOSE SERPL-MCNC: 106 MG/DL
HCT VFR BLD CALC: 40.9 %
HGB BLD-MCNC: 13 G/DL
IMM GRANULOCYTES NFR BLD AUTO: 0.2 %
LYMPHOCYTES # BLD AUTO: 2.01 K/UL
LYMPHOCYTES NFR BLD AUTO: 37.6 %
MAN DIFF?: NORMAL
MCHC RBC-ENTMCNC: 31.8 GM/DL
MCHC RBC-ENTMCNC: 33.1 PG
MCV RBC AUTO: 104.1 FL
MONOCYTES # BLD AUTO: 0.41 K/UL
MONOCYTES NFR BLD AUTO: 7.7 %
NEUTROPHILS # BLD AUTO: 2.63 K/UL
NEUTROPHILS NFR BLD AUTO: 49.1 %
PLATELET # BLD AUTO: 220 K/UL
POTASSIUM SERPL-SCNC: 4.5 MMOL/L
PROT SERPL-MCNC: 6.6 G/DL
RBC # BLD: 3.93 M/UL
RBC # FLD: 14 %
SODIUM SERPL-SCNC: 142 MMOL/L
WBC # FLD AUTO: 5.35 K/UL

## 2022-04-05 ENCOUNTER — APPOINTMENT (OUTPATIENT)
Dept: INTERNAL MEDICINE | Facility: CLINIC | Age: 72
End: 2022-04-05
Payer: MEDICARE

## 2022-04-05 VITALS
BODY MASS INDEX: 26.62 KG/M2 | HEIGHT: 61 IN | WEIGHT: 141 LBS | DIASTOLIC BLOOD PRESSURE: 66 MMHG | SYSTOLIC BLOOD PRESSURE: 138 MMHG

## 2022-04-05 DIAGNOSIS — R73.03 PREDIABETES.: ICD-10-CM

## 2022-04-05 DIAGNOSIS — Z87.898 PERSONAL HISTORY OF OTHER SPECIFIED CONDITIONS: ICD-10-CM

## 2022-04-05 DIAGNOSIS — R39.89 OTHER SYMPTOMS AND SIGNS INVOLVING THE GENITOURINARY SYSTEM: ICD-10-CM

## 2022-04-05 DIAGNOSIS — E78.5 HYPERLIPIDEMIA, UNSPECIFIED: ICD-10-CM

## 2022-04-05 DIAGNOSIS — H26.9 UNSPECIFIED CATARACT: ICD-10-CM

## 2022-04-05 PROCEDURE — 99214 OFFICE O/P EST MOD 30 MIN: CPT

## 2022-04-05 RX ORDER — DICLOFENAC SODIUM 1% 10 MG/G
1 GEL TOPICAL
Qty: 3 | Refills: 3 | Status: DISCONTINUED | COMMUNITY
Start: 2021-08-18 | End: 2022-04-05

## 2022-04-05 RX ORDER — TACROLIMUS 1 MG/G
0.1 OINTMENT TOPICAL
Qty: 1 | Refills: 3 | Status: ACTIVE | COMMUNITY
Start: 2021-04-16

## 2022-04-05 NOTE — PHYSICAL EXAM
[No JVD] : no jugular venous distention [Normal Affect] : the affect was normal [Alert and Oriented x3] : oriented to person, place, and time [Normal Insight/Judgement] : insight and judgment were intact [No Edema] : there was no peripheral edema [Normal] : normal gait, coordination grossly intact, no focal deficits and deep tendon reflexes were 2+ and symmetric

## 2022-04-05 NOTE — ASSESSMENT
[High Risk Surgery - Intraperitoneal, Intrathoracic or Supringuinal Vascular Procedures] : High Risk Surgery - Intraperitoneal, Intrathoracic or Supringuinal Vascular Procedures - No (0) [Ischemic Heart Disease] : Ischemic Heart Disease - No (0) [Congestive Heart Failure] : Congestive Heart Failure - No (0) [Prior Cerebrovascular Accident or TIA] : Prior Cerebrovascular Accident or TIA - No (0) [Creatinine >= 2mg/dL (1 Point)] : Creatinine >= 2mg/dL - No (0) [Insulin-dependent Diabetic (1 Point)] : Insulin-dependent Diabetic - No (0) [0] : 0 , RCRI Class: I, Risk of Post-Op Cardiac Complications: 3.9%, 95% CI for Risk Estimate: 2.8% - 5.4% [No Further Testing Recommended] : no further testing recommended [FreeTextEntry4] : CBC, CMP, Coags, CXR and EKG reviewed. At this time there are no medical contraindications to planned procedure. Pt may proceed at acceptable risk.

## 2022-04-05 NOTE — HISTORY OF PRESENT ILLNESS
[No Pertinent Cardiac History] : no history of aortic stenosis, atrial fibrillation, coronary artery disease, recent myocardial infarction, or implantable device/pacemaker [No Pertinent Pulmonary History] : no history of asthma, COPD, sleep apnea, or smoking [No Adverse Anesthesia Reaction] : no adverse anesthesia reaction in self or family member [(Patient denies any chest pain, claudication, dyspnea on exertion, orthopnea, palpitations or syncope)] : Patient denies any chest pain, claudication, dyspnea on exertion, orthopnea, palpitations or syncope [Moderate (4-6 METs)] : Moderate (4-6 METs) [Chronic Anticoagulation] : no chronic anticoagulation [Chronic Kidney Disease] : no chronic kidney disease [Diabetes] : no diabetes [FreeTextEntry1] : Cystoscopy, Laser Lithotripsy [FreeTextEntry2] : 04/20/2022 [FreeTextEntry3] : Dr Artie Oliver [FreeTextEntry4] : Pt was seen by Dr Vásquez for f/u of his prostate cancer and MRI was done. They noticed a stone in the bladder and pt also had some mild microscopic blood in urine. He saw Dr Oliver and he is now going for cystoscopy with laser lithotripsy. He feels well. No complaints of chest pain or SOB. He walks for exercise almost every day without issue. He can climb a flight of stairs without issue.  [FreeTextEntry7] : [facility] reviewed from 1/2022, no findings for ischemia

## 2022-04-20 ENCOUNTER — RESULT REVIEW (OUTPATIENT)
Age: 72
End: 2022-04-20

## 2022-04-20 ENCOUNTER — TRANSCRIPTION ENCOUNTER (OUTPATIENT)
Age: 72
End: 2022-04-20

## 2022-05-24 ENCOUNTER — APPOINTMENT (OUTPATIENT)
Dept: RHEUMATOLOGY | Facility: CLINIC | Age: 72
End: 2022-05-24
Payer: MEDICARE

## 2022-05-24 VITALS
WEIGHT: 141 LBS | HEART RATE: 67 BPM | BODY MASS INDEX: 26.62 KG/M2 | OXYGEN SATURATION: 97 % | SYSTOLIC BLOOD PRESSURE: 114 MMHG | DIASTOLIC BLOOD PRESSURE: 64 MMHG | HEIGHT: 61 IN

## 2022-05-24 DIAGNOSIS — M19.042 PRIMARY OSTEOARTHRITIS, RIGHT HAND: ICD-10-CM

## 2022-05-24 DIAGNOSIS — M19.041 PRIMARY OSTEOARTHRITIS, RIGHT HAND: ICD-10-CM

## 2022-05-24 PROCEDURE — 99213 OFFICE O/P EST LOW 20 MIN: CPT | Mod: 25

## 2022-05-24 PROCEDURE — 36415 COLL VENOUS BLD VENIPUNCTURE: CPT

## 2022-05-25 LAB
ALBUMIN SERPL ELPH-MCNC: 4.3 G/DL
ALP BLD-CCNC: 99 U/L
ALT SERPL-CCNC: 17 U/L
ANION GAP SERPL CALC-SCNC: 17 MMOL/L
AST SERPL-CCNC: 25 U/L
BASOPHILS # BLD AUTO: 0.04 K/UL
BASOPHILS NFR BLD AUTO: 0.7 %
BILIRUB SERPL-MCNC: 0.4 MG/DL
BUN SERPL-MCNC: 19 MG/DL
CALCIUM SERPL-MCNC: 9.3 MG/DL
CHLORIDE SERPL-SCNC: 105 MMOL/L
CO2 SERPL-SCNC: 21 MMOL/L
CREAT SERPL-MCNC: 0.91 MG/DL
CRP SERPL-MCNC: <3 MG/L
EGFR: 89 ML/MIN/1.73M2
EOSINOPHIL # BLD AUTO: 0.29 K/UL
EOSINOPHIL NFR BLD AUTO: 4.8 %
ERYTHROCYTE [SEDIMENTATION RATE] IN BLOOD BY WESTERGREN METHOD: 11 MM/HR
GLUCOSE SERPL-MCNC: 98 MG/DL
HCT VFR BLD CALC: 42.8 %
HGB BLD-MCNC: 13.3 G/DL
IMM GRANULOCYTES NFR BLD AUTO: 0.3 %
LYMPHOCYTES # BLD AUTO: 2.57 K/UL
LYMPHOCYTES NFR BLD AUTO: 42.8 %
MAN DIFF?: NORMAL
MCHC RBC-ENTMCNC: 31.1 GM/DL
MCHC RBC-ENTMCNC: 31.7 PG
MCV RBC AUTO: 101.9 FL
MONOCYTES # BLD AUTO: 0.52 K/UL
MONOCYTES NFR BLD AUTO: 8.7 %
NEUTROPHILS # BLD AUTO: 2.56 K/UL
NEUTROPHILS NFR BLD AUTO: 42.7 %
PLATELET # BLD AUTO: 192 K/UL
POTASSIUM SERPL-SCNC: 4.8 MMOL/L
PROT SERPL-MCNC: 6.8 G/DL
RBC # BLD: 4.2 M/UL
RBC # FLD: 13.7 %
SODIUM SERPL-SCNC: 143 MMOL/L
WBC # FLD AUTO: 6 K/UL

## 2022-05-27 PROBLEM — M19.041 OSTEOARTHRITIS OF HANDS, BILATERAL: Status: ACTIVE | Noted: 2021-04-01

## 2022-05-27 NOTE — HISTORY OF PRESENT ILLNESS
[FreeTextEntry1] : He was found to have microscopic hematuria.  Workup revealed a large stone.  He had lithotripsy by Dr. Oliver on 4/20/22.\par \par He takes MTX 6 tabs weekly + folic acid daily.  He notes improvement on the higher dose of MTX.  He held it for 2 weeks prior to the procedure.\par He gets pain in his fingers sometimes.  Minimal morning stiffness.

## 2022-06-03 ENCOUNTER — RX RENEWAL (OUTPATIENT)
Age: 72
End: 2022-06-03

## 2022-07-05 ENCOUNTER — APPOINTMENT (OUTPATIENT)
Dept: ENDOCRINOLOGY | Facility: CLINIC | Age: 72
End: 2022-07-05

## 2022-07-05 ENCOUNTER — APPOINTMENT (OUTPATIENT)
Dept: INTERNAL MEDICINE | Facility: CLINIC | Age: 72
End: 2022-07-05

## 2022-07-05 VITALS
SYSTOLIC BLOOD PRESSURE: 120 MMHG | HEIGHT: 61 IN | TEMPERATURE: 98 F | OXYGEN SATURATION: 98 % | HEART RATE: 56 BPM | DIASTOLIC BLOOD PRESSURE: 70 MMHG

## 2022-07-05 DIAGNOSIS — Z87.39 PERSONAL HISTORY OF OTHER DISEASES OF THE MUSCULOSKELETAL SYSTEM AND CONNECTIVE TISSUE: ICD-10-CM

## 2022-07-05 DIAGNOSIS — Z01.818 ENCOUNTER FOR OTHER PREPROCEDURAL EXAMINATION: ICD-10-CM

## 2022-07-05 DIAGNOSIS — M25.562 PAIN IN LEFT KNEE: ICD-10-CM

## 2022-07-05 LAB
ESTIMATED AVERAGE GLUCOSE: 126 MG/DL
HBA1C MFR BLD HPLC: 6 %
TSH SERPL-ACNC: 4.1 UIU/ML

## 2022-07-05 PROCEDURE — 99214 OFFICE O/P EST MOD 30 MIN: CPT | Mod: 25

## 2022-07-05 PROCEDURE — 99214 OFFICE O/P EST MOD 30 MIN: CPT

## 2022-07-05 PROCEDURE — 36415 COLL VENOUS BLD VENIPUNCTURE: CPT

## 2022-07-05 RX ORDER — LEVOFLOXACIN 250 MG/1
250 TABLET, FILM COATED ORAL
Qty: 10 | Refills: 0 | Status: COMPLETED | COMMUNITY
Start: 2022-03-08

## 2022-07-05 NOTE — HISTORY OF PRESENT ILLNESS
[FreeTextEntry1] : Jul 05, 2022     in person\par \par PCP: Dr. Izzy Vital\par             Urol: Dr. Hurd p  in [facility]  ref to Dr. Oliver for bladder stone\par             Eyes: Dr. Twan Suresh, Jr - cataract - 2/2021\par             GI: Dr. Oscar Laura- ? a few years ago - told of polyps \par             Derm:  Dr. Feli Husain and Carmen - psoriasis - recent leg Bx \par             Rheum:  Dr. Nicki Velázquez \par            .\par            CC: Underactive thyroid (TPO and Tg Ab negative) Dx: ~12/2015  Dr. Driver\par                          3/26/18 U/S Thyroid - negative \par             (Prediabetes: 3/2016 A1c 6.4 12/12/2017 A1c 6.1, 3/2018: OGTT peak 180) 1/2/2020 A1c 6.0 %\par             (B12 deficiency, B12 177, MCV elevated - abs negative) \par             (spinal stenosis - Dr. Post) \par             (3/2017 - radical prostatectomy)\par             (6/2020 - asymptomatic Covid 19 positive ab) \par \par Visits for underactive thyroid, pre-diabetes.\par Medication list he brings shows:\par levothyroxine 25 mcg daily\par B12 1000 mg daily\par folic acid 1 mg daily\par Losartan 25 mg daily\par famotidine 40 mg HS\par methotrexate 2.5 mg - 6 tabs once a week \par \par : :\par Constitutional:  Alert, well nourished, healthy appearance, no acute distress \par Eyes:  No proptosis, no stare\par Thyroid:  normal to palpation   \par Pulmonary:  No respiratory distress, no accessory muscle use; normal rate and effort\par Cardiac:  Normal rate\par Vascular: \par Endocrine:  No stigmata of Cushing’s Syndrome\par Musculoskeletal:  Normal gait, no involuntary movements\par Neurology:  No tremors\par Affect/Mood/Psych:  Oriented x 3; normal affect, normal insight/judgement, normal mood \par .\par Impression:  \par Now taking water to which 3 drops lemon juice added a few times a week to prevent further stones.\par He is scheduled to f/u to Dr. Oliver in a few months.   \par Early hypothyroidism has been well controlled on LT4 25 mcg daily.\par His prediabetes has been diet controlled.\par \par Plan:  TFTs, A1c today   \par ROV six months.\par LT4 renewed.  \par  \par \par Jan 04, 2022    in person\par \par   PCP: Dr. Izzy Vital\par             Urol: Dr. Hurd p  in [facility]\par             Eyes: Dr. Twan Suresh,  - cataract - 2/2021\par             GI: Dr. Oscar Laura- ? a few years ago - told of polyps \par             Derm:  Dr. Feli Husain and Carmen - psoriasis - recent leg Bx \par             Rheum:  Dr. Nicki Velázquez \par            .\par            CC: Underactive thyroid (TPO and Tg Ab negative) Dx: ~12/2015  Dr. Driver\par                          3/26/18 U/S Thyroid - negative \par             (Prediabetes: 3/2016 A1c 6.4 12/12/2017 A1c 6.1, 3/2018: OGTT peak 180) 1/2/2020 A1c 6.0 %\par             (B12 deficiency, B12 177, MCV elevated) \par             (spinal stenosis - Dr. Post) \par             (3/2017 - radical prostatectomy)\par             (6/2020 - asymptomatic Covid 19 positive ab) \par \par Visits for underactive thyroid, pre-diabetes.\par Medication list he brings shows:\par levothyroxine 25 mcg daily\par B12 1000 mg daily\par folic acid 1 mg daily\par Losartan 25 mg daily\par famotidine 40 mg HS\par methotrexate 2.5 mg - 6 tabs once a week \par \par In June 2021, fructosamine within normal range.  \par TSH 2.91\par \par Impression:  Prediabetes and early hypothyroidism well controlled.\par Plan:  Diet Rx of prediabetes; updated A1c, BS today.\par TSH today.\par ROV in about six months.\par \par \par \par Jun 16, 2021    in person \par \par   PCP: Dr. Izzy Vital\par             Urol: Dr. Hurd p  in [place] \par             Eyes: Dr. Twan Suresh Jr - cataract - 2/2021\par             GI: Dr. Oscar Laura- ? a few years ago - told of polyps \par             Derm:  Dr. Feli Husain and Carmen - psoriasis - recent leg Bx \par             Rheum:  Dr. Nicki Velázquez \par            .\par            CC: Underactive thyroid (TPO and Tg Ab negative) Dx: ~12/2015  Dr. Driver\par                          3/26/18 U/S Thyroid - negative \par             (Prediabetes: 3/2016 A1c 6.4 12/12/2017 A1c 6.1, 3/2018: OGTT peak 180) 1/2/2020 A1c 6.0 %\par             (B12 deficiency, B12 177, MCV elevated) \par             (spinal stenosis - Dr. Post) \par             (3/2017 - radical prostatectomy)\par             (6/2020 - asymptomatic Covid 19 positive ab) \par \par Visits for hypothyroidism and history of A1c up to 6.4 %\par \par : :\par Constitutional:  Alert, well nourished, healthy appearance, no acute distress \par Eyes:  No proptosis, no stare\par Thyroid:\par Pulmonary:  No respiratory distress, no accessory muscle use; normal rate and effort\par Cardiac:  Normal rate\par Vascular: \par Endocrine:  No stigmata of Cushing’s Syndrome\par Musculoskeletal:  Normal gait, no involuntary movements\par Neurology:  No tremors\par Affect/Mood/Psych:  Oriented x 3; normal affect, normal insight/judgement, normal mood \par .\par \par Impression:  Remains on levothyroxine 25 mcg daily.  December TSH 3.05\par B12 normal on OTC B12\par December A1c 6.0 %\par \par Plan:  Same Rx and ROV about 6 months.\par LT4 renewed. \par A1c, TSH today\par \par Dec 18, 2020    in person\par \par   PCP: Dr. Izzy Vital\par             Urol: Dr. Hurd p  in [place] \par             Eyes: Dr. Twan Suresh Jr (saw 3/2017 and again 1/2018)\par             GI: Dr. Oscar Laura- ? a few years ago - told of polyps \par             Derm:  Dr. Feli Husain - psoriasis\par             Rheum:  Dr. Nicki Velázquez \par            .\par            CC: Underactive thyroid (TPO and Tg Ab negative) Dx: ~12/2015  Dr. Driver\par                          3/26/18 U/S Thyroid - negative \par             (Prediabetes: 3/2016 A1c 6.4 12/12/2017 A1c 6.1, 3/2018: OGTT peak 180)\par             (B12 deficiency, B12 177, MCV elevated) \par             (spinal stenosis - Dr. Post) \par             (3/2017 - radical prostatectomy)\par             (6/2020 - asymptomatic Covid 19 positive ab) \par \par Visits for hypothyroidism and history of A1c up to 6.4 %\par June 2020 A1c was 6.1% and  mg/dl\par TSH 2.29 on LT4 25 mcg daily \par \par : :\par Constitutional:  Alert, well nourished, healthy appearance, no acute distress \par Eyes:  No proptosis, no stare\par Thyroid:\par Pulmonary:  No respiratory distress, no accessory muscle use; normal rate and effort\par Cardiac:  Normal rate\par Vascular: \par Endocrine:  No stigmata of Cushing’s Syndrome\par Musculoskeletal:  Normal gait, no involuntary movements\par Neurology:  No tremors\par Affect/Mood/Psych:  Oriented x 3; normal affect, normal insight/judgement, normal mood \par .\par  Impression:  Hypothyrodism well controlled.\par Diabetes:  diet controled.\par \par \par \par \par \par Pravin 15, 2020     in person\par \par   PCP: Dr. Izzy Vital\par             Urol: Dr. Hurd p  in [place] \par             Eyes: Dr. Twan Suresh Jr (saw 3/2017 and again 1/2018)\par             GI: Dr. Oscar Laura- ? a few years ago - told of polyps \par             Derm:  Dr. Feli Husain - psoriasis\par            .\par            CC: Underactive thyroid (TPO and Tg Ab negative) Dx: ~12/2015  Dr. Driver\par                          3/26/18 U/S Thyroid - negative \par             (Prediabetes: 3/2016 A1c 6.4 12/12/2017 A1c 6.1, 3/2018: OGTT peak 180)\par             (B12 deficiency, B12 177, MCV elevated) \par             (spinal stenosis - Dr. Post) \par             (3/2017 - radical prostatectomy)\par \par Visits for hypothyroidism and history of A1c up to 6.4 %\par Feels well.\par Walks almost every day, down to [place].\par Taking levothyroxine 25 mcg daily - initial Dx by Dr. Driver\par \par Impression:  Dong well.\par \par Plan:  TSH today, follow up A1c.\par ROV six months.  \par \par \par Dec 20, 2019\par \par   PCP: Dr. Izzy Vital\par             Urol: Dr. Hurd p  in [place] \par             Eyes: Dr. Twan Suresh Jr (saw 3/2017 and again 1/2018)\par             GI: Dr. Oscar Laura- ? a few years ago - told of polyps \par             Derm:  Dr. Feli Husain\par            .\par            CC: Underactive thyroid (TPO and Tg Ab negative) Dx: ~12/2015  Dr. Driver\par                          3/26/18 U/S Thyroid - negative \par             (Prediabetes: 3/2016 A1c 6.4 12/12/2017 A1c 6.1, 3/2018: OGTT peak 180)\par             (B12 deficiency, B12 177, MCV elevated) \par             (spinal stenosis - Dr. Post) \par             (3/2017 - radical prostatectomy)\par \par 68 yo visits for hypothyroidism, prediabetes.  Saw Dr. Vital today.\par On B12 for B12 deficiency.\par Levothyroxine 25 mcg for very early hypothyroidism.  \par \par Lab test in June 2019 iincluded\par TSH 2.84 (on 25 mcg of LT4)\par A1c 6.2 %\par \par \par Impression:  Doing well.\par Plan:  Review results of updated thyroid function tests, A1c, B12, folate.\par ROV several months.  \par \par \par \par \par \par \par Radha 3, 2019\par \par      .\par            PCP: Dr. Izzy Vital\par             Urol: Dr. Stacy Sotomayor\par             Eyes: Dr. Twan Suresh Jr (saw 3/2017 and again 1/2018)\par             GI: Dr. Oscar Laura- ? a few years ago - told of polyps \par             Derm:  Dr. Feli Husain\par            .\par            CC: Underactive thyroid (TPO and Tg Ab negative) Dx: ~12/2015  Dr. Driver\par                          3/26/18 U/S Thyroid - negative \par             (Prediabetes: 3/2016 A1c 6.4 12/12/2017 A1c 6.1, 3/2018: OGTT peak 180)\par             (B12 deficiency, B12 177)\par             (spinal stenosis - Dr. Post) \par             (3/2017 - radical prostatectomy)\par \par Lab tests from December 3, 2018 at [facility] included\par TSH 3.15\par HbA1c 6.0 %\par He reports that blood pressure has been running on the low side..\par \par His med list iincludes\par Losartan 50 mg daily\par Levothyroxine 25 mcg daily and\par Vitamin b12 1000 mcg daily.\par \par Impression: : doiing well\par TFTs today and ROV 6 months.   \par \par \par \par \par December 3, 2018\par \par            .\par            PCP: Dr. Izzy Vital\par             Urol: Dr. Stacy Sotomayor\par             Eyes: Dr. Demetrice Jr (saw 3/2017 and again 1/2018)\par             GI: Dr. Laura- ? a few years ago - told of polyps \par             Derm:  Dr. MELODIE Husain\par            .\par            CC: Underactive thyroid (TPO and Tg Ab negative)\par             3/26/18 U/S Thyroid - negative \par             (Prediabetes: 3/2016 A1c 6.4 12/12/2017 A1c 6.1, 3/2018: OGTT peak 180)\par             (B12 deficiency, B12 177)\par             (spinal stenosis - Dr. Post) \par             (3/2017 - radical prostatectomy)\par \par Has early hypothyroidism.  Initial Rx with levothyroxine did not go well:  he felt poorly and stopped.\par Now taking 25 mcg daily and tolerating it well.\par He was reluctant to take vitamin B12 for blood level of 177, but now takes it regularly.\par A1c as high as 6.4 % indicating prediabetes.\par .\par Imrpession:  He feels well and is doing well.\par Plan:  As arranged by Dr. Vital he will have A1c, TFTs checked today.\par ROV ~6 months.  \par \par \par Previous notes from [facility] appended below:\par \par \par  April 9, 2018\par            .\par            PCP: Dr. Izzy Vital\par             Urol: Dr. Stacy Sotomayor\par             Eyes: Dr. Demetrice Jr (saw 3/2017 and again 1/2018)\par             GI: Dr. Laura- ? a few years ago - told of polyps \par            .\par            CC: Underactive thyroid (TPO and Tg Ab negative)\par             3/26/18 U/S Thyroid - negative \par             (Prediabetes: 3/2016 A1c 6.4 12/12/2017 A1c 6.1, 3/2018: OGTT peak 180)\par             (B12 deficiency) \par             (spinal stenosis - Dr. Post) \par             (3/2017 - radical prostatectomy)\par            .\par            With some negotiating, patient agreed to take vitamin B12 1000 mcg daily and eventually agreed to take levothyroxine 25 mcg every other day. He is also on Losartan.\par            .\par            He recently saw Dr. Sotomayor and lab tests from 3/27/2018 include undetectable TSA.\par            Labs from 3/26/2018 show\par            TSH 3.62, down from 6.25 in December\par            December B12 level was 1119 \par            Ultrasound thryoid was very reassuring.\par            A1c had gone from 5.7 to 6.1 so he went for formal OGTT:\par            FBS wqs 105 and peak (2 hr) sugar was 180 - also consistant with\par            diagnosis of prediabetes.\par            .\par            Impression Early hypothyroidism well controlled on levothyroxine 25 mcg QOD. He is aware of diagnosis of prediabetes. As he is reluctant to do self blood glucose monitoring at this time, A1c once-twice a year and occasional fasting and random glucose levels should suffice for now\par            Encourage walking.\par            .\par            ROV here when he returns for CPX, likely in December. \par            .\par            .\par            ==\par            .\par            December 15, 2017\par            .\par            .\par            PCP: Dr. Izzy Vital\par             Urol: Dr. Stacy Sotomayor\par             Eyes: Dr. Demetrice Jr (saw 3/2017 and again 1/2018)\par             GI: Dr. Laura- ? a few years ago - told of polyps \par            .\par            CC: Underactive thyroid (TPO and Tg Ab negative)\par             (Prediabetes)\par             (B12 deficiency) \par             (spinal stenosis - Dr. Post) \par             (3/2017 - radical prostatectomy)\par            .\par            66 yo former [employer] employee.\par            .\par            He returns regarding early ("pre-") hypothyroidism and prediabetes.\par            He does not like to take medication. He has well documented B12 deficiency and it took a lot of convincing to get him to take po B12, but he does take it now.\par            He did try taking levothyroxine in the past, but did not like how it made him feel.\par            He knows he has prediabetes, but is not quite ready to start self blood glucose monitoring. \par            .\par            Lab tests (Rob) kindly provided by Dr. Vital from\par            12/12/2017 include\par            free T4 0.8\par            TSH 6.25 **\par            B12 1119\par            glucose 105 mg/dl (fasting)\par            A1c 6.1 % (5.7 in May)\par            .\par            Impression: Slowly stuttering into hypothyroidism.\par            He would also like ultrasound of thyroid. \par            .\par            Plan: He is willing to try 25 mcg levothyroxine. I will start him on it every other day. Ultrasound thyroid. \par            .\par            ROV in February. Will address the sugars then. Thank you. -\par            .\par            ==\par            .\par            May 8, 2017\par            .\par            PCP: Dr. Izzy Vital\par             Urol: Dr. Stacy Sotomayor\par            .\par            CC: Underactive thyroid. \par             (Prediabetes)\par             (B12 deficiency) \par             (spinal stenosis - Dr. Post) \par            .\par            Since last visit, underwent radical prostatectomy March 2017\par            Feels well.\par            .\par            November Thyroid and B12 levels in good range. \par            .\par            Plan: A1c, TSH.\par            ROV 8 months. \par            .\par            ==\par            .\par            November 21, 2016\par            .\par            PCP: Dr. Maik Driver\par            .\par            CC: Underactive thyroid. \par             (Prediabetes)\par             (B12 deficiency) \par             (spinal stenosis - Dr. Post) \par            .\par            On po B12, his B12 level went from 170's to 700's.\par            He stopped his thyroid pill.\par            His last A1c 6.4 % (pre-diabetes).\par            .\par            Plan: Check\par            B12\par            A1c\par            TSH\par            ROV 6-8 months.\par            .\par            .\par            ==\par            .\par            July 20, 2016\par            .\par            PCP: Dr. Maik Driver\par            .\par            CC: Underactive thyroid. \par             (Prediabetes)\par             (B12 deficiency) \par             (spinal stenosis - Dr. Post) \par            .\par            Because of L shoulder pain, recently saw Dr. Castro and received benefit from Voltanen 75 mg with Omeprazole \par            .\par            Labs in March included\par             mg/dl\par            TSH 2.65\par            B12 171 (before starting po B12) \par            HbA1c 6.4 % **\par            .\par            Impression: TSH is remaining within normal limits even though he chose not to take levothyroxine.\par            .\par            B12 is being treated. \par            .\par            A1c indicates pre-diabetes - counsedled today and in the future will consider monitoring glucose levels. \par            .\par            ==\par            .\par            May 19, 2016\par            .\par            PCP: Dr. Maik Driver\par            .\par            CC: Underactive thyroid. \par             (spinal stenosis - Dr. Post) \par            .\par            He did not like the thyroid medication, so he stopped it (!)\par            He is taking\par            Vitamin B12 1000 mcg daily and\par            Telmisartan (Micardis) 20 mg daily. \par            .\par            He went for lab tests (Rob)\par            3/17/2016\par            vitamin B12 171 *** (despite po B12 x 1 month)\par            TSH 2.65\par            .\par            .\par            Impression: "I feel pretty good...."\par            TSH has remained normal, even though he has stopped the\par            levothyroxine.\par            However, despite taking 1000 mcg of PO B12 for one month, the level is still low. \par            .\par            Plan: Advised him he may benefit from injectable B12\par            He will discuss with Dr. Driver. \par            .\par            ==\par            .\par            March 17, 2016\par            .\par            PCP: Dr. Maik Driver\par            .\par            CC: Underactive thyroid. \par             (spinal stenosis - Dr. Post) \par            .\par            Currently tolerating thyroid medication.\par            .\par            Plan: Continue to monitor TFTs on levothyroxine and monitor his symptoms. \par            .\par            ==\par            .\par            January 18, 2016\par            .\par            PCP: Dr. Maik Driver\par            .\par            CC: Underactive thyroid. \par             (spinal stenosis - Dr. Post) \par            .\par            Accompanied by his daughter, Sarah.\par            His wife, Osiris, visits here.\par            .\par            December he started on levothyroxine 50 mcg daily.\par            He noted sweating and dose changed to 75 mcg.\par            Took last levothyroxine a week ago and his leg and back pain and sweats went away. \par            He is on Telmisartan 20 mg daily for elevated blood pressure. \par            .\par            Impression: Reason for symptoms not clear.\par            .\par            Plan: Updated labs today and obtain old records (offices closed for [facility] Day). TOV 6-8 weeks.

## 2022-07-05 NOTE — PHYSICAL EXAM
[No Carotid Bruits] : no carotid bruits [Coordination Grossly Intact] : coordination grossly intact [Normal Gait] : normal gait [Normal] : affect was normal and insight and judgment were intact

## 2022-07-05 NOTE — HISTORY OF PRESENT ILLNESS
[FreeTextEntry1] : follow-up  [de-identified] : Pt here for f/u visit. he is still walking for exercise. No cardiac complaints. He is feeling well.

## 2022-07-05 NOTE — HEALTH RISK ASSESSMENT
[Never] : Never [No] : In the past 12 months have you used drugs other than those required for medical reasons? No [No falls in past year] : Patient reported no falls in the past year [0] : 2) Feeling down, depressed, or hopeless: Not at all (0) [de-identified] : yes  [de-identified] : regular diet

## 2022-07-06 LAB
ALBUMIN SERPL ELPH-MCNC: 4.3 G/DL
ALP BLD-CCNC: 92 U/L
ALT SERPL-CCNC: 17 U/L
ANION GAP SERPL CALC-SCNC: 11 MMOL/L
AST SERPL-CCNC: 23 U/L
BASOPHILS # BLD AUTO: 0.03 K/UL
BASOPHILS NFR BLD AUTO: 0.6 %
BILIRUB SERPL-MCNC: 0.4 MG/DL
BUN SERPL-MCNC: 22 MG/DL
CALCIUM SERPL-MCNC: 9.3 MG/DL
CHLORIDE SERPL-SCNC: 107 MMOL/L
CHOLEST SERPL-MCNC: 134 MG/DL
CO2 SERPL-SCNC: 25 MMOL/L
CREAT SERPL-MCNC: 0.8 MG/DL
CREAT SPEC-SCNC: 383 MG/DL
EGFR: 93 ML/MIN/1.73M2
EOSINOPHIL # BLD AUTO: 0.22 K/UL
EOSINOPHIL NFR BLD AUTO: 4.4 %
FOLATE SERPL-MCNC: 18.8 NG/ML
GLUCOSE SERPL-MCNC: 108 MG/DL
HCT VFR BLD CALC: 41.1 %
HDLC SERPL-MCNC: 41 MG/DL
HGB BLD-MCNC: 12.8 G/DL
IMM GRANULOCYTES NFR BLD AUTO: 0.2 %
LDLC SERPL CALC-MCNC: 80 MG/DL
LYMPHOCYTES # BLD AUTO: 2.07 K/UL
LYMPHOCYTES NFR BLD AUTO: 41.6 %
MAN DIFF?: NORMAL
MCHC RBC-ENTMCNC: 31.1 GM/DL
MCHC RBC-ENTMCNC: 31.9 PG
MCV RBC AUTO: 102.5 FL
MICROALBUMIN 24H UR DL<=1MG/L-MCNC: 1.3 MG/DL
MICROALBUMIN/CREAT 24H UR-RTO: 3 MG/G
MONOCYTES # BLD AUTO: 0.37 K/UL
MONOCYTES NFR BLD AUTO: 7.4 %
NEUTROPHILS # BLD AUTO: 2.28 K/UL
NEUTROPHILS NFR BLD AUTO: 45.8 %
NONHDLC SERPL-MCNC: 93 MG/DL
PLATELET # BLD AUTO: 185 K/UL
POTASSIUM SERPL-SCNC: 4.2 MMOL/L
PROT SERPL-MCNC: 6.6 G/DL
PSA SERPL-MCNC: <0.01 NG/ML
RBC # BLD: 4.01 M/UL
RBC # FLD: 14.8 %
SODIUM SERPL-SCNC: 143 MMOL/L
TRIGL SERPL-MCNC: 64 MG/DL
TSH SERPL-ACNC: 2.83 UIU/ML
VIT B12 SERPL-MCNC: 1367 PG/ML
WBC # FLD AUTO: 4.98 K/UL

## 2022-07-10 LAB
ESTIMATED AVERAGE GLUCOSE: 128 MG/DL
HBA1C MFR BLD HPLC: 6.1 %
T4 SERPL-MCNC: 7.1 UG/DL
TSH SERPL-ACNC: 2.8 UIU/ML

## 2022-08-31 ENCOUNTER — APPOINTMENT (OUTPATIENT)
Dept: RHEUMATOLOGY | Facility: CLINIC | Age: 72
End: 2022-08-31

## 2022-08-31 VITALS
BODY MASS INDEX: 26.62 KG/M2 | SYSTOLIC BLOOD PRESSURE: 122 MMHG | DIASTOLIC BLOOD PRESSURE: 60 MMHG | WEIGHT: 141 LBS | HEART RATE: 70 BPM | HEIGHT: 61 IN | OXYGEN SATURATION: 93 %

## 2022-08-31 PROCEDURE — 36415 COLL VENOUS BLD VENIPUNCTURE: CPT

## 2022-08-31 PROCEDURE — 99213 OFFICE O/P EST LOW 20 MIN: CPT | Mod: 25

## 2022-09-09 NOTE — HISTORY OF PRESENT ILLNESS
[FreeTextEntry1] : He takes MTX 6 tabs weekly + folic acid daily. He sometimes has pain in his left PIP2, and sometimes swelling in his right 3rd MCP.\par \par He notes when he stands up from a seated position or walks he gets pins and needles and crawling sensation that starts on the bottom of his feet going up the back of his leg into his buttocks.  DOes not goes into lower bck.  Never happens a rest.  Only when starting to walk or actively walking.  It will go away when resting but hten can start again later when walking.  It will go away after a short distance by itself.

## 2022-09-22 ENCOUNTER — RX RENEWAL (OUTPATIENT)
Age: 72
End: 2022-09-22

## 2022-11-18 ENCOUNTER — RESULT REVIEW (OUTPATIENT)
Age: 72
End: 2022-11-18

## 2022-11-23 ENCOUNTER — APPOINTMENT (OUTPATIENT)
Dept: RHEUMATOLOGY | Facility: CLINIC | Age: 72
End: 2022-11-23

## 2022-11-23 VITALS
HEART RATE: 68 BPM | BODY MASS INDEX: 26.62 KG/M2 | SYSTOLIC BLOOD PRESSURE: 122 MMHG | OXYGEN SATURATION: 95 % | DIASTOLIC BLOOD PRESSURE: 64 MMHG | HEIGHT: 61 IN | WEIGHT: 141 LBS

## 2022-11-23 PROCEDURE — 36415 COLL VENOUS BLD VENIPUNCTURE: CPT

## 2022-11-23 PROCEDURE — 99213 OFFICE O/P EST LOW 20 MIN: CPT | Mod: 25

## 2022-11-30 NOTE — HISTORY OF PRESENT ILLNESS
[FreeTextEntry1] : He takes MTX 6 tabs weekly + folic acid daily. He sometimes has pain and stiffness in his left PIP2 in the morning.\par No skin psoriasis.  He had an annual skin check.\par \par He still has low back pain with numbness/tingling.\par He is doing PT, which is helping.  But numbness/tingling still persists.

## 2023-01-06 ENCOUNTER — APPOINTMENT (OUTPATIENT)
Dept: ENDOCRINOLOGY | Facility: CLINIC | Age: 73
End: 2023-01-06
Payer: MEDICARE

## 2023-01-06 ENCOUNTER — APPOINTMENT (OUTPATIENT)
Dept: INTERNAL MEDICINE | Facility: CLINIC | Age: 73
End: 2023-01-06
Payer: MEDICARE

## 2023-01-06 ENCOUNTER — LABORATORY RESULT (OUTPATIENT)
Age: 73
End: 2023-01-06

## 2023-01-06 ENCOUNTER — NON-APPOINTMENT (OUTPATIENT)
Age: 73
End: 2023-01-06

## 2023-01-06 VITALS
DIASTOLIC BLOOD PRESSURE: 70 MMHG | BODY MASS INDEX: 23.6 KG/M2 | SYSTOLIC BLOOD PRESSURE: 125 MMHG | WEIGHT: 125 LBS | HEIGHT: 61 IN | HEART RATE: 71 BPM | OXYGEN SATURATION: 98 %

## 2023-01-06 VITALS
HEART RATE: 78 BPM | WEIGHT: 125 LBS | HEIGHT: 61 IN | BODY MASS INDEX: 23.6 KG/M2 | OXYGEN SATURATION: 97 % | DIASTOLIC BLOOD PRESSURE: 66 MMHG | SYSTOLIC BLOOD PRESSURE: 138 MMHG

## 2023-01-06 PROCEDURE — G0439: CPT

## 2023-01-06 PROCEDURE — G0444 DEPRESSION SCREEN ANNUAL: CPT | Mod: 59

## 2023-01-06 PROCEDURE — 99213 OFFICE O/P EST LOW 20 MIN: CPT | Mod: 25

## 2023-01-06 PROCEDURE — 36415 COLL VENOUS BLD VENIPUNCTURE: CPT

## 2023-01-06 PROCEDURE — 93000 ELECTROCARDIOGRAM COMPLETE: CPT | Mod: 59

## 2023-01-06 PROCEDURE — 99214 OFFICE O/P EST MOD 30 MIN: CPT

## 2023-01-06 NOTE — HISTORY OF PRESENT ILLNESS
[PMH Reviewed and Updated] : past medical history reviewed and updated [PSH Reviewed and Updated] : past surgical history reviewed and updated [Family History Reviewed and Updated] : family history reviewed and updated [Medication and Allergies Reconciled] : medication and allergies reconciled [0] : 0 [Retired] : retired from work [None] : The patient has no concerns about alcohol abuse [Never] : has never used illicit drugs [Walking] : walking [Compliant with medications] : compliant with medications [Spouse] : spouse [Children] : children [Extended Family] : extended family [Fully Independent] : fully independent [Drives without concerns] : drives without concerns [No history of falls] : no history of falls [Seatbelts] : seatbelts [Bicycle Helmet] : bicycle helmet [Safe Driving Habits] : safe driving habits [Smoke Detectors] : smoke detectors [Carbon Monoxide Detector] : carbon monoxide detector [Hot Water Temp <120F] : hot water temp <120F [Patient Has Full Capacity] : this patient has full decision making capacity for discussion of advance care planning [Patient Has Designated Health Care Proxy/Advanced Directive] : patient has designated Health Care Proxy/Advanced Directive [FreeTextEntry1] : wellness\par  [de-identified] : Pt here for medicare annual wellness. \par Pt had MRI LS spine in 11/22 with rheumatology.  He  has been going to physical therapy and seeing Dr Ortega. He got an epidural for spinal stenosis and has been feeling better.\par He has followed with his urologic oncologist Dr Vásquez.\par he has been walking for exercise. No  unusual fatigue.\par He has a good appetite. He eats the usual three meals per day.\par Overall feels well. No concerns.\par his dughter just gave birth to twins, a body and a girl.  [Over the Past 2 Weeks, Have You Felt Down, Depressed, or Hopeless?] : 1.) Over the past 2 weeks, have you felt down, depressed, or hopeless? No [Over the Past 2 Weeks, Have You Felt Little Interest or Pleasure Doing Things?] : 2.) Over the past 2 weeks, have you felt little interest or pleasure doing things? No [Motorcycle Helmet] : not using motorcycle helmet [Bathroom Grab Bars] : not using bathroom grab bars [Gun Trigger Locks] : not using gun trigger locks [Gun Safe] : not using a gun safe [CPR Training for Household] : did not receive CPR training for patient [Sunscreen] : not using sunscreen [CPR Training for Patient] : did not receive CPR training for patient [FreeTextEntry2] : none [de-identified] : none

## 2023-01-06 NOTE — HISTORY OF PRESENT ILLNESS
[FreeTextEntry1] : Jul 05, 2022     in person\par \par PCP: Dr. Izzy Vital\par             Urol: Dr. Hurd p  in [facility]  ref to Dr. Oliver for bladder stone\par             Eyes: Dr. Twan Suresh, Jr - cataract - 2/2021\par             GI: Dr. Oscar Laura- ? a few years ago - told of polyps \par             Derm:  Dr. Feli Husain and Carmen - psoriasis - recent leg Bx \par             Rheum:  Dr. Nicki Velázquez \par            .\par            CC: Underactive thyroid (TPO and Tg Ab negative) Dx: ~12/2015  Dr. Driver\par                          3/26/18 U/S Thyroid - negative \par             (Prediabetes: 3/2016 A1c 6.4 12/12/2017 A1c 6.1, 3/2018: OGTT peak 180) 1/2/2020 A1c 6.0 %\par             (B12 deficiency, B12 177, MCV elevated - abs negative) \par             (spinal stenosis - Dr. Post) \par             (3/2017 - radical prostatectomy)\par             (6/2020 - asymptomatic Covid 19 positive ab) \par \par Visits for underactive thyroid, pre-diabetes.\par Medication list he brings shows:\par levothyroxine 25 mcg daily\par B12 1000 mg daily\par folic acid 1 mg daily\par Losartan 25 mg daily\par famotidine 40 mg HS\par methotrexate 2.5 mg - 6 tabs once a week \par \par : :\par Constitutional:  Alert, well nourished, healthy appearance, no acute distress \par Eyes:  No proptosis, no stare\par Thyroid:  normal to palpation   \par Pulmonary:  No respiratory distress, no accessory muscle use; normal rate and effort\par Cardiac:  Normal rate\par Vascular: \par Endocrine:  No stigmata of Cushing’s Syndrome\par Musculoskeletal:  Normal gait, no involuntary movements\par Neurology:  No tremors\par Affect/Mood/Psych:  Oriented x 3; normal affect, normal insight/judgement, normal mood \par .\par Impression:  \par Now taking water to which 3 drops lemon juice added a few times a week to prevent further stones.\par He is scheduled to f/u to Dr. Oliver in a few months.   \par Early hypothyroidism has been well controlled on LT4 25 mcg daily.\par His prediabetes has been diet controlled.\par \par Plan:  TFTs, A1c today   \par ROV six months.\par LT4 renewed.  \par  \par \par Jan 04, 2022    in person\par \par   PCP: Dr. Izzy Vital\par             Urol: Dr. Hurd p  in [facility]\par             Eyes: Dr. Twan Suresh,  - cataract - 2/2021\par             GI: Dr. Oscar Laura- ? a few years ago - told of polyps \par             Derm:  Dr. Feli Husain and Carmen - psoriasis - recent leg Bx \par             Rheum:  Dr. Nicki Velázquez \par            .\par            CC: Underactive thyroid (TPO and Tg Ab negative) Dx: ~12/2015  Dr. Driver\par                          3/26/18 U/S Thyroid - negative \par             (Prediabetes: 3/2016 A1c 6.4 12/12/2017 A1c 6.1, 3/2018: OGTT peak 180) 1/2/2020 A1c 6.0 %\par             (B12 deficiency, B12 177, MCV elevated) \par             (spinal stenosis - Dr. Post) \par             (3/2017 - radical prostatectomy)\par             (6/2020 - asymptomatic Covid 19 positive ab) \par \par Visits for underactive thyroid, pre-diabetes.\par Medication list he brings shows:\par levothyroxine 25 mcg daily\par B12 1000 mg daily\par folic acid 1 mg daily\par Losartan 25 mg daily\par famotidine 40 mg HS\par methotrexate 2.5 mg - 6 tabs once a week \par \par In June 2021, fructosamine within normal range.  \par TSH 2.91\par \par Impression:  Prediabetes and early hypothyroidism well controlled.\par Plan:  Diet Rx of prediabetes; updated A1c, BS today.\par TSH today.\par ROV in about six months.\par \par \par \par Jun 16, 2021    in person \par \par   PCP: Dr. Izzy Vital\par             Urol: Dr. Hurd p  in [place] \par             Eyes: Dr. Twan Suresh Jr - cataract - 2/2021\par             GI: Dr. Oscar Laura- ? a few years ago - told of polyps \par             Derm:  Dr. Feli Husain and Carmen - psoriasis - recent leg Bx \par             Rheum:  Dr. Nicki Velázquez \par            .\par            CC: Underactive thyroid (TPO and Tg Ab negative) Dx: ~12/2015  Dr. Driver\par                          3/26/18 U/S Thyroid - negative \par             (Prediabetes: 3/2016 A1c 6.4 12/12/2017 A1c 6.1, 3/2018: OGTT peak 180) 1/2/2020 A1c 6.0 %\par             (B12 deficiency, B12 177, MCV elevated) \par             (spinal stenosis - Dr. Post) \par             (3/2017 - radical prostatectomy)\par             (6/2020 - asymptomatic Covid 19 positive ab) \par \par Visits for hypothyroidism and history of A1c up to 6.4 %\par \par : :\par Constitutional:  Alert, well nourished, healthy appearance, no acute distress \par Eyes:  No proptosis, no stare\par Thyroid:\par Pulmonary:  No respiratory distress, no accessory muscle use; normal rate and effort\par Cardiac:  Normal rate\par Vascular: \par Endocrine:  No stigmata of Cushing’s Syndrome\par Musculoskeletal:  Normal gait, no involuntary movements\par Neurology:  No tremors\par Affect/Mood/Psych:  Oriented x 3; normal affect, normal insight/judgement, normal mood \par .\par \par Impression:  Remains on levothyroxine 25 mcg daily.  December TSH 3.05\par B12 normal on OTC B12\par December A1c 6.0 %\par \par Plan:  Same Rx and ROV about 6 months.\par LT4 renewed. \par A1c, TSH today\par \par Dec 18, 2020    in person\par \par   PCP: Dr. Izzy Vital\par             Urol: Dr. Hurd p  in [place] \par             Eyes: Dr. Twan Suresh Jr (saw 3/2017 and again 1/2018)\par             GI: Dr. Oscar Laura- ? a few years ago - told of polyps \par             Derm:  Dr. Feli Husain - psoriasis\par             Rheum:  Dr. Nicki Velázquez \par            .\par            CC: Underactive thyroid (TPO and Tg Ab negative) Dx: ~12/2015  Dr. Driver\par                          3/26/18 U/S Thyroid - negative \par             (Prediabetes: 3/2016 A1c 6.4 12/12/2017 A1c 6.1, 3/2018: OGTT peak 180)\par             (B12 deficiency, B12 177, MCV elevated) \par             (spinal stenosis - Dr. Post) \par             (3/2017 - radical prostatectomy)\par             (6/2020 - asymptomatic Covid 19 positive ab) \par \par Visits for hypothyroidism and history of A1c up to 6.4 %\par June 2020 A1c was 6.1% and  mg/dl\par TSH 2.29 on LT4 25 mcg daily \par \par : :\par Constitutional:  Alert, well nourished, healthy appearance, no acute distress \par Eyes:  No proptosis, no stare\par Thyroid:\par Pulmonary:  No respiratory distress, no accessory muscle use; normal rate and effort\par Cardiac:  Normal rate\par Vascular: \par Endocrine:  No stigmata of Cushing’s Syndrome\par Musculoskeletal:  Normal gait, no involuntary movements\par Neurology:  No tremors\par Affect/Mood/Psych:  Oriented x 3; normal affect, normal insight/judgement, normal mood \par .\par  Impression:  Hypothyrodism well controlled.\par Diabetes:  diet controled.\par \par \par \par \par \par Pravin 15, 2020     in person\par \par   PCP: Dr. Izzy Vital\par             Urol: Dr. Hurd p  in [place] \par             Eyes: Dr. Twan Suresh Jr (saw 3/2017 and again 1/2018)\par             GI: Dr. Oscar Laura- ? a few years ago - told of polyps \par             Derm:  Dr. Feli Husain - psoriasis\par            .\par            CC: Underactive thyroid (TPO and Tg Ab negative) Dx: ~12/2015  Dr. Driver\par                          3/26/18 U/S Thyroid - negative \par             (Prediabetes: 3/2016 A1c 6.4 12/12/2017 A1c 6.1, 3/2018: OGTT peak 180)\par             (B12 deficiency, B12 177, MCV elevated) \par             (spinal stenosis - Dr. Post) \par             (3/2017 - radical prostatectomy)\par \par Visits for hypothyroidism and history of A1c up to 6.4 %\par Feels well.\par Walks almost every day, down to [place].\par Taking levothyroxine 25 mcg daily - initial Dx by Dr. Driver\par \par Impression:  Dong well.\par \par Plan:  TSH today, follow up A1c.\par ROV six months.  \par \par \par Dec 20, 2019\par \par   PCP: Dr. Izzy Vital\par             Urol: Dr. Hurd p  in [place] \par             Eyes: Dr. Twan Suresh Jr (saw 3/2017 and again 1/2018)\par             GI: Dr. Oscar Laura- ? a few years ago - told of polyps \par             Derm:  Dr. Feli Husain\par            .\par            CC: Underactive thyroid (TPO and Tg Ab negative) Dx: ~12/2015  Dr. Driver\par                          3/26/18 U/S Thyroid - negative \par             (Prediabetes: 3/2016 A1c 6.4 12/12/2017 A1c 6.1, 3/2018: OGTT peak 180)\par             (B12 deficiency, B12 177, MCV elevated) \par             (spinal stenosis - Dr. Post) \par             (3/2017 - radical prostatectomy)\par \par 68 yo visits for hypothyroidism, prediabetes.  Saw Dr. Vital today.\par On B12 for B12 deficiency.\par Levothyroxine 25 mcg for very early hypothyroidism.  \par \par Lab test in June 2019 iincluded\par TSH 2.84 (on 25 mcg of LT4)\par A1c 6.2 %\par \par \par Impression:  Doing well.\par Plan:  Review results of updated thyroid function tests, A1c, B12, folate.\par ROV several months.  \par \par \par \par \par \par \par Radha 3, 2019\par \par      .\par            PCP: Dr. Izzy Vital\par             Urol: Dr. Stacy Sotomayor\par             Eyes: Dr. Twan Suresh Jr (saw 3/2017 and again 1/2018)\par             GI: Dr. Oscar Laura- ? a few years ago - told of polyps \par             Derm:  Dr. Feli Husain\par            .\par            CC: Underactive thyroid (TPO and Tg Ab negative) Dx: ~12/2015  Dr. Driver\par                          3/26/18 U/S Thyroid - negative \par             (Prediabetes: 3/2016 A1c 6.4 12/12/2017 A1c 6.1, 3/2018: OGTT peak 180)\par             (B12 deficiency, B12 177)\par             (spinal stenosis - Dr. Post) \par             (3/2017 - radical prostatectomy)\par \par Lab tests from December 3, 2018 at [facility] included\par TSH 3.15\par HbA1c 6.0 %\par He reports that blood pressure has been running on the low side..\par \par His med list iincludes\par Losartan 50 mg daily\par Levothyroxine 25 mcg daily and\par Vitamin b12 1000 mcg daily.\par \par Impression: : doiing well\par TFTs today and ROV 6 months.   \par \par \par \par \par December 3, 2018\par \par            .\par            PCP: Dr. Izzy Vital\par             Urol: Dr. Stacy Sotomayor\par             Eyes: Dr. Demetrice Jr (saw 3/2017 and again 1/2018)\par             GI: Dr. Laura- ? a few years ago - told of polyps \par             Derm:  Dr. MELODIE Husain\par            .\par            CC: Underactive thyroid (TPO and Tg Ab negative)\par             3/26/18 U/S Thyroid - negative \par             (Prediabetes: 3/2016 A1c 6.4 12/12/2017 A1c 6.1, 3/2018: OGTT peak 180)\par             (B12 deficiency, B12 177)\par             (spinal stenosis - Dr. Post) \par             (3/2017 - radical prostatectomy)\par \par Has early hypothyroidism.  Initial Rx with levothyroxine did not go well:  he felt poorly and stopped.\par Now taking 25 mcg daily and tolerating it well.\par He was reluctant to take vitamin B12 for blood level of 177, but now takes it regularly.\par A1c as high as 6.4 % indicating prediabetes.\par .\par Imrpession:  He feels well and is doing well.\par Plan:  As arranged by Dr. Vital he will have A1c, TFTs checked today.\par ROV ~6 months.  \par \par \par Previous notes from [facility] appended below:\par \par \par  April 9, 2018\par            .\par            PCP: Dr. Izzy Vital\par             Urol: Dr. Stacy Sotomayor\par             Eyes: Dr. Demetrice Jr (saw 3/2017 and again 1/2018)\par             GI: Dr. Laura- ? a few years ago - told of polyps \par            .\par            CC: Underactive thyroid (TPO and Tg Ab negative)\par             3/26/18 U/S Thyroid - negative \par             (Prediabetes: 3/2016 A1c 6.4 12/12/2017 A1c 6.1, 3/2018: OGTT peak 180)\par             (B12 deficiency) \par             (spinal stenosis - Dr. Post) \par             (3/2017 - radical prostatectomy)\par            .\par            With some negotiating, patient agreed to take vitamin B12 1000 mcg daily and eventually agreed to take levothyroxine 25 mcg every other day. He is also on Losartan.\par            .\par            He recently saw Dr. Sotomayor and lab tests from 3/27/2018 include undetectable TSA.\par            Labs from 3/26/2018 show\par            TSH 3.62, down from 6.25 in December\par            December B12 level was 1119 \par            Ultrasound thryoid was very reassuring.\par            A1c had gone from 5.7 to 6.1 so he went for formal OGTT:\par            FBS wqs 105 and peak (2 hr) sugar was 180 - also consistant with\par            diagnosis of prediabetes.\par            .\par            Impression Early hypothyroidism well controlled on levothyroxine 25 mcg QOD. He is aware of diagnosis of prediabetes. As he is reluctant to do self blood glucose monitoring at this time, A1c once-twice a year and occasional fasting and random glucose levels should suffice for now\par            Encourage walking.\par            .\par            ROV here when he returns for CPX, likely in December. \par            .\par            .\par            ==\par            .\par            December 15, 2017\par            .\par            .\par            PCP: Dr. Izzy Vital\par             Urol: Dr. Stacy Sotomayor\par             Eyes: Dr. Demetrice Jr (saw 3/2017 and again 1/2018)\par             GI: Dr. Laura- ? a few years ago - told of polyps \par            .\par            CC: Underactive thyroid (TPO and Tg Ab negative)\par             (Prediabetes)\par             (B12 deficiency) \par             (spinal stenosis - Dr. Post) \par             (3/2017 - radical prostatectomy)\par            .\par            66 yo former [employer] employee.\par            .\par            He returns regarding early ("pre-") hypothyroidism and prediabetes.\par            He does not like to take medication. He has well documented B12 deficiency and it took a lot of convincing to get him to take po B12, but he does take it now.\par            He did try taking levothyroxine in the past, but did not like how it made him feel.\par            He knows he has prediabetes, but is not quite ready to start self blood glucose monitoring. \par            .\par            Lab tests (Rob) kindly provided by Dr. Vital from\par            12/12/2017 include\par            free T4 0.8\par            TSH 6.25 **\par            B12 1119\par            glucose 105 mg/dl (fasting)\par            A1c 6.1 % (5.7 in May)\par            .\par            Impression: Slowly stuttering into hypothyroidism.\par            He would also like ultrasound of thyroid. \par            .\par            Plan: He is willing to try 25 mcg levothyroxine. I will start him on it every other day. Ultrasound thyroid. \par            .\par            ROV in February. Will address the sugars then. Thank you. -\par            .\par            ==\par            .\par            May 8, 2017\par            .\par            PCP: Dr. Izzy Vital\par             Urol: Dr. Stacy Sotomayor\par            .\par            CC: Underactive thyroid. \par             (Prediabetes)\par             (B12 deficiency) \par             (spinal stenosis - Dr. Post) \par            .\par            Since last visit, underwent radical prostatectomy March 2017\par            Feels well.\par            .\par            November Thyroid and B12 levels in good range. \par            .\par            Plan: A1c, TSH.\par            ROV 8 months. \par            .\par            ==\par            .\par            November 21, 2016\par            .\par            PCP: Dr. Maik rDiver\par            .\par            CC: Underactive thyroid. \par             (Prediabetes)\par             (B12 deficiency) \par             (spinal stenosis - Dr. Post) \par            .\par            On po B12, his B12 level went from 170's to 700's.\par            He stopped his thyroid pill.\par            His last A1c 6.4 % (pre-diabetes).\par            .\par            Plan: Check\par            B12\par            A1c\par            TSH\par            ROV 6-8 months.\par            .\par            .\par            ==\par            .\par            July 20, 2016\par            .\par            PCP: Dr. Maik Driver\par            .\par            CC: Underactive thyroid. \par             (Prediabetes)\par             (B12 deficiency) \par             (spinal stenosis - Dr. Post) \par            .\par            Because of L shoulder pain, recently saw Dr. Castro and received benefit from Voltanen 75 mg with Omeprazole \par            .\par            Labs in March included\par             mg/dl\par            TSH 2.65\par            B12 171 (before starting po B12) \par            HbA1c 6.4 % **\par            .\par            Impression: TSH is remaining within normal limits even though he chose not to take levothyroxine.\par            .\par            B12 is being treated. \par            .\par            A1c indicates pre-diabetes - counsedled today and in the future will consider monitoring glucose levels. \par            .\par            ==\par            .\par            May 19, 2016\par            .\par            PCP: Dr. Maik Driver\par            .\par            CC: Underactive thyroid. \par             (spinal stenosis - Dr. Post) \par            .\par            He did not like the thyroid medication, so he stopped it (!)\par            He is taking\par            Vitamin B12 1000 mcg daily and\par            Telmisartan (Micardis) 20 mg daily. \par            .\par            He went for lab tests (Rob)\par            3/17/2016\par            vitamin B12 171 *** (despite po B12 x 1 month)\par            TSH 2.65\par            .\par            .\par            Impression: "I feel pretty good...."\par            TSH has remained normal, even though he has stopped the\par            levothyroxine.\par            However, despite taking 1000 mcg of PO B12 for one month, the level is still low. \par            .\par            Plan: Advised him he may benefit from injectable B12\par            He will discuss with Dr. Driver. \par            .\par            ==\par            .\par            March 17, 2016\par            .\par            PCP: Dr. Maik Driver\par            .\par            CC: Underactive thyroid. \par             (spinal stenosis - Dr. Post) \par            .\par            Currently tolerating thyroid medication.\par            .\par            Plan: Continue to monitor TFTs on levothyroxine and monitor his symptoms. \par            .\par            ==\par            .\par            January 18, 2016\par            .\par            PCP: Dr. Maik Driver\par            .\par            CC: Underactive thyroid. \par             (spinal stenosis - Dr. Post) \par            .\par            Accompanied by his daughter, Sarah.\par            His wife, Osiris, visits here.\par            .\par            December he started on levothyroxine 50 mcg daily.\par            He noted sweating and dose changed to 75 mcg.\par            Took last levothyroxine a week ago and his leg and back pain and sweats went away. \par            He is on Telmisartan 20 mg daily for elevated blood pressure. \par            .\par            Impression: Reason for symptoms not clear.\par            .\par            Plan: Updated labs today and obtain old records (offices closed for [facility] Day). TOV 6-8 weeks.

## 2023-01-06 NOTE — PHYSICAL EXAM
[Normal Sclera/Conjunctiva] : normal sclera/conjunctiva [EOMI] : extraocular movements intact [Normal Outer Ear/Nose] : the outer ears and nose were normal in appearance [Normal TMs] : both tympanic membranes were normal [No JVD] : no jugular venous distention [No Lymphadenopathy] : no lymphadenopathy [Supple] : supple [No Carotid Bruits] : no carotid bruits [Pedal Pulses Present] : the pedal pulses are present [No Edema] : there was no peripheral edema [Normal Posterior Cervical Nodes] : no posterior cervical lymphadenopathy [Normal Anterior Cervical Nodes] : no anterior cervical lymphadenopathy [No Rash] : no rash [Normal] : affect was normal and insight and judgment were intact [de-identified] : numerous moles and skin lesions

## 2023-01-06 NOTE — DATA REVIEWED
Have You Had Botox Before?: has had botox When Was Your Last Botox Treatment?: 6/11/19 [FreeTextEntry1] : reviewed last labs

## 2023-01-06 NOTE — HEALTH RISK ASSESSMENT
[Patient reported colonoscopy was normal] : Patient reported colonoscopy was normal [HIV test declined] : HIV test declined [Hepatitis C test declined] : Hepatitis C test declined [None] : None [With Significant Other] : lives with significant other [With Family] : lives with family [Retired] : retired [] :  [Fully functional (bathing, dressing, toileting, transferring, walking, feeding)] : Fully functional (bathing, dressing, toileting, transferring, walking, feeding) [Fully functional (using the telephone, shopping, preparing meals, housekeeping, doing laundry, using] : Fully functional and needs no help or supervision to perform IADLs (using the telephone, shopping, preparing meals, housekeeping, doing laundry, using transportation, managing medications and managing finances) [Change in mental status noted] : No change in mental status noted [Reports changes in hearing] : Reports no changes in hearing [Reports changes in vision] : Reports no changes in vision [Reports changes in dental health] : Reports no changes in dental health [ColonoscopyDate] : 11/21 [ColonoscopyComments] : Dr Bauer, needs in 2026 [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

## 2023-01-07 LAB — 25(OH)D3 SERPL-MCNC: 16 NG/ML

## 2023-01-08 LAB
ALBUMIN SERPL ELPH-MCNC: 4.4 G/DL
ALP BLD-CCNC: 84 U/L
ALT SERPL-CCNC: 17 U/L
ANION GAP SERPL CALC-SCNC: 14 MMOL/L
APPEARANCE: CLEAR
AST SERPL-CCNC: 24 U/L
BASOPHILS # BLD AUTO: 0.02 K/UL
BASOPHILS NFR BLD AUTO: 0.4 %
BILIRUB SERPL-MCNC: 0.4 MG/DL
BILIRUBIN URINE: NEGATIVE
BLOOD URINE: ABNORMAL
BUN SERPL-MCNC: 18 MG/DL
CALCIUM SERPL-MCNC: 9.7 MG/DL
CHLORIDE SERPL-SCNC: 106 MMOL/L
CHOLEST SERPL-MCNC: 159 MG/DL
CO2 SERPL-SCNC: 24 MMOL/L
COLOR: YELLOW
CREAT SERPL-MCNC: 0.91 MG/DL
EGFR: 89 ML/MIN/1.73M2
EOSINOPHIL # BLD AUTO: 0.04 K/UL
EOSINOPHIL NFR BLD AUTO: 0.7 %
ESTIMATED AVERAGE GLUCOSE: 126 MG/DL
GLUCOSE QUALITATIVE U: NEGATIVE
GLUCOSE SERPL-MCNC: 103 MG/DL
HBA1C MFR BLD HPLC: 6 %
HCT VFR BLD CALC: 43.6 %
HDLC SERPL-MCNC: 46 MG/DL
HGB BLD-MCNC: 14.4 G/DL
IMM GRANULOCYTES NFR BLD AUTO: 0.4 %
KETONES URINE: NEGATIVE
LDLC SERPL CALC-MCNC: 96 MG/DL
LEUKOCYTE ESTERASE URINE: NEGATIVE
LYMPHOCYTES # BLD AUTO: 1.7 K/UL
LYMPHOCYTES NFR BLD AUTO: 29.9 %
MAN DIFF?: NORMAL
MCHC RBC-ENTMCNC: 33 GM/DL
MCHC RBC-ENTMCNC: 34.1 PG
MCV RBC AUTO: 103.3 FL
MONOCYTES # BLD AUTO: 0.4 K/UL
MONOCYTES NFR BLD AUTO: 7 %
NEUTROPHILS # BLD AUTO: 3.5 K/UL
NEUTROPHILS NFR BLD AUTO: 61.6 %
NITRITE URINE: NEGATIVE
NONHDLC SERPL-MCNC: 113 MG/DL
PH URINE: 5
PLATELET # BLD AUTO: 202 K/UL
POTASSIUM SERPL-SCNC: 4.2 MMOL/L
PROT SERPL-MCNC: 6.8 G/DL
PROTEIN URINE: NORMAL
PSA SERPL-MCNC: <0.01 NG/ML
RBC # BLD: 4.22 M/UL
RBC # FLD: 14.7 %
SODIUM SERPL-SCNC: 144 MMOL/L
SPECIFIC GRAVITY URINE: 1.02
TRIGL SERPL-MCNC: 84 MG/DL
TSH SERPL-ACNC: 2.93 UIU/ML
UROBILINOGEN URINE: NORMAL
WBC # FLD AUTO: 5.68 K/UL

## 2023-01-09 LAB
ALBUMIN SERPL ELPH-MCNC: 4.4 G/DL
ALP BLD-CCNC: 107 U/L
ALT SERPL-CCNC: 15 U/L
ANION GAP SERPL CALC-SCNC: 11 MMOL/L
AST SERPL-CCNC: 21 U/L
BASOPHILS # BLD AUTO: 0.07 K/UL
BASOPHILS NFR BLD AUTO: 1 %
BILIRUB SERPL-MCNC: 0.3 MG/DL
BUN SERPL-MCNC: 17 MG/DL
CALCIUM SERPL-MCNC: 9.7 MG/DL
CHLORIDE SERPL-SCNC: 101 MMOL/L
CO2 SERPL-SCNC: 28 MMOL/L
CREAT SERPL-MCNC: 0.91 MG/DL
CRP SERPL-MCNC: <3 MG/L
EGFR: 89 ML/MIN/1.73M2
EOSINOPHIL # BLD AUTO: 0.36 K/UL
EOSINOPHIL NFR BLD AUTO: 4.9 %
ERYTHROCYTE [SEDIMENTATION RATE] IN BLOOD BY WESTERGREN METHOD: 16 MM/HR
GLIADIN IGA SER QL: 7.4 UNITS
GLIADIN IGG SER QL: <5 UNITS
GLIADIN PEPTIDE IGA SER-ACNC: NEGATIVE
GLIADIN PEPTIDE IGG SER-ACNC: NEGATIVE
GLUCOSE SERPL-MCNC: 114 MG/DL
HCT VFR BLD CALC: 41.5 %
HGB BLD-MCNC: 13.2 G/DL
IMM GRANULOCYTES NFR BLD AUTO: 0.3 %
LYMPHOCYTES # BLD AUTO: 2.49 K/UL
LYMPHOCYTES NFR BLD AUTO: 33.9 %
MAN DIFF?: NORMAL
MCHC RBC-ENTMCNC: 31.8 GM/DL
MCHC RBC-ENTMCNC: 33.2 PG
MCV RBC AUTO: 104.3 FL
MONOCYTES # BLD AUTO: 0.54 K/UL
MONOCYTES NFR BLD AUTO: 7.3 %
NEUTROPHILS # BLD AUTO: 3.87 K/UL
NEUTROPHILS NFR BLD AUTO: 52.6 %
PLATELET # BLD AUTO: 197 K/UL
POTASSIUM SERPL-SCNC: 4.8 MMOL/L
PROT SERPL-MCNC: 6.8 G/DL
RBC # BLD: 3.98 M/UL
RBC # FLD: 14.2 %
SODIUM SERPL-SCNC: 140 MMOL/L
TTG IGA SER IA-ACNC: <1.2 U/ML
TTG IGA SER-ACNC: NEGATIVE
TTG IGG SER IA-ACNC: <1.2 U/ML
TTG IGG SER IA-ACNC: NEGATIVE
WBC # FLD AUTO: 7.35 K/UL

## 2023-03-01 ENCOUNTER — LABORATORY RESULT (OUTPATIENT)
Age: 73
End: 2023-03-01

## 2023-03-01 ENCOUNTER — APPOINTMENT (OUTPATIENT)
Dept: RHEUMATOLOGY | Facility: CLINIC | Age: 73
End: 2023-03-01
Payer: MEDICARE

## 2023-03-01 VITALS
SYSTOLIC BLOOD PRESSURE: 118 MMHG | DIASTOLIC BLOOD PRESSURE: 72 MMHG | WEIGHT: 125 LBS | HEIGHT: 61 IN | BODY MASS INDEX: 23.6 KG/M2

## 2023-03-01 VITALS — HEART RATE: 83 BPM | OXYGEN SATURATION: 98 %

## 2023-03-01 DIAGNOSIS — M54.30 SCIATICA, UNSPECIFIED SIDE: ICD-10-CM

## 2023-03-01 PROCEDURE — 99214 OFFICE O/P EST MOD 30 MIN: CPT | Mod: 25

## 2023-03-01 PROCEDURE — 36415 COLL VENOUS BLD VENIPUNCTURE: CPT

## 2023-03-01 RX ORDER — GABAPENTIN 100 MG/1
100 CAPSULE ORAL
Qty: 30 | Refills: 3 | Status: DISCONTINUED | COMMUNITY
Start: 2022-08-31 | End: 2023-03-01

## 2023-03-02 LAB
25(OH)D3 SERPL-MCNC: 18.4 NG/ML
ALBUMIN SERPL ELPH-MCNC: 4.5 G/DL
ALP BLD-CCNC: 90 U/L
ALT SERPL-CCNC: 17 U/L
ANION GAP SERPL CALC-SCNC: 14 MMOL/L
AST SERPL-CCNC: 23 U/L
BASOPHILS # BLD AUTO: 0.07 K/UL
BASOPHILS NFR BLD AUTO: 0.8 %
BILIRUB SERPL-MCNC: 0.3 MG/DL
BUN SERPL-MCNC: 16 MG/DL
CALCIUM SERPL-MCNC: 9.5 MG/DL
CHLORIDE SERPL-SCNC: 106 MMOL/L
CO2 SERPL-SCNC: 24 MMOL/L
CREAT SERPL-MCNC: 1.23 MG/DL
CRP SERPL-MCNC: <3 MG/L
EGFR: 62 ML/MIN/1.73M2
EOSINOPHIL # BLD AUTO: 0.16 K/UL
EOSINOPHIL NFR BLD AUTO: 1.9 %
ERYTHROCYTE [SEDIMENTATION RATE] IN BLOOD BY WESTERGREN METHOD: 16 MM/HR
GLUCOSE SERPL-MCNC: 98 MG/DL
HCT VFR BLD CALC: 43.1 %
HGB BLD-MCNC: 13.6 G/DL
IMM GRANULOCYTES NFR BLD AUTO: 0.2 %
LYMPHOCYTES # BLD AUTO: 2.75 K/UL
LYMPHOCYTES NFR BLD AUTO: 32.4 %
MAN DIFF?: NORMAL
MCHC RBC-ENTMCNC: 31.6 GM/DL
MCHC RBC-ENTMCNC: 33.7 PG
MCV RBC AUTO: 106.7 FL
MONOCYTES # BLD AUTO: 0.54 K/UL
MONOCYTES NFR BLD AUTO: 6.4 %
NEUTROPHILS # BLD AUTO: 4.96 K/UL
NEUTROPHILS NFR BLD AUTO: 58.3 %
PLATELET # BLD AUTO: 213 K/UL
POTASSIUM SERPL-SCNC: 4.7 MMOL/L
PROT SERPL-MCNC: 6.5 G/DL
RBC # BLD: 4.04 M/UL
RBC # FLD: 14.3 %
SODIUM SERPL-SCNC: 144 MMOL/L
WBC # FLD AUTO: 8.5 K/UL

## 2023-03-27 PROBLEM — M54.30 SCIATICA WITHOUT BACK PAIN: Status: ACTIVE | Noted: 2022-08-31

## 2023-03-27 NOTE — HISTORY OF PRESENT ILLNESS
[FreeTextEntry1] : He takes MTX 6 tabs weekly + folic acid daily. \par \par His right PIP3's were painful and swollen.  He was also having difficulty with dexterity of his hands.\par He had to take Motrin 200 mg twice a day for 2 days, and it went away.\par \par He received the shingles vaccine and notes a few small areas of flaking and redness afterward.  No other major psoriasis.

## 2023-04-01 ENCOUNTER — RX RENEWAL (OUTPATIENT)
Age: 73
End: 2023-04-01

## 2023-05-23 ENCOUNTER — APPOINTMENT (OUTPATIENT)
Dept: INTERNAL MEDICINE | Facility: CLINIC | Age: 73
End: 2023-05-23
Payer: MEDICARE

## 2023-05-23 ENCOUNTER — RESULT REVIEW (OUTPATIENT)
Age: 73
End: 2023-05-23

## 2023-05-23 VITALS
SYSTOLIC BLOOD PRESSURE: 122 MMHG | RESPIRATION RATE: 16 BRPM | HEIGHT: 61 IN | HEART RATE: 94 BPM | WEIGHT: 126 LBS | DIASTOLIC BLOOD PRESSURE: 60 MMHG | OXYGEN SATURATION: 96 % | BODY MASS INDEX: 23.79 KG/M2 | TEMPERATURE: 97.7 F

## 2023-05-23 DIAGNOSIS — J34.3 HYPERTROPHY OF NASAL TURBINATES: ICD-10-CM

## 2023-05-23 PROCEDURE — 99214 OFFICE O/P EST MOD 30 MIN: CPT | Mod: 25

## 2023-05-25 PROBLEM — J34.3 HYPERTROPHY, NASAL, TURBINATE: Status: ACTIVE | Noted: 2023-05-23

## 2023-05-25 NOTE — PHYSICAL EXAM
[Normal Voice/Communication] : normal voice/communication [No Acute Distress] : no acute distress [Normal Oropharynx] : the oropharynx was normal [No Lymphadenopathy] : no lymphadenopathy [No Respiratory Distress] : no respiratory distress  [Normal] : normal rate, regular rhythm, normal S1 and S2 and no murmur heard [de-identified] : TM enlarged with no exudate and no sinus tenderness  [de-identified] : scattered mild expiratory wheeze with good air entry. No rales or rhonchi

## 2023-05-25 NOTE — REVIEW OF SYSTEMS
[Cough] : cough [Fever] : no fever [Chills] : no chills [Night Sweats] : no night sweats [Nasal Discharge] : no nasal discharge [Chest Pain] : no chest pain [Shortness Of Breath] : no shortness of breath [Wheezing] : no wheezing [Dyspnea on Exertion] : not dyspnea on exertion [Abdominal Pain] : no abdominal pain [Nausea] : no nausea [Diarrhea] : no diarrhea [Vomiting] : no vomiting [Heartburn] : no heartburn [FreeTextEntry7] : resolved diarrhea

## 2023-05-25 NOTE — HISTORY OF PRESENT ILLNESS
[FreeTextEntry8] : c/o since April 27 having dry cough, chills, sweats, \par Did a lot of COVID tests at [facility] and all normal.\par No SOB, wheeze, vomiting. Had diarrhea after  med now resolved. \par \par Has no GERD and no PN drip, rhinitis, sneeze, itchy or sore throat.

## 2023-05-29 DIAGNOSIS — Z86.010 PERSONAL HISTORY OF COLONIC POLYPS: ICD-10-CM

## 2023-05-29 DIAGNOSIS — Z87.448 PERSONAL HISTORY OF OTHER DISEASES OF URINARY SYSTEM: ICD-10-CM

## 2023-05-29 DIAGNOSIS — M19.90 UNSPECIFIED OSTEOARTHRITIS, UNSPECIFIED SITE: ICD-10-CM

## 2023-05-29 DIAGNOSIS — K21.9 GASTRO-ESOPHAGEAL REFLUX DISEASE W/OUT ESOPHAGITIS: ICD-10-CM

## 2023-05-29 DIAGNOSIS — Z86.39 PERSONAL HISTORY OF OTHER ENDOCRINE, NUTRITIONAL AND METABOLIC DISEASE: ICD-10-CM

## 2023-05-29 DIAGNOSIS — M72.0 PALMAR FASCIAL FIBROMATOSIS [DUPUYTREN]: ICD-10-CM

## 2023-05-29 DIAGNOSIS — Z86.79 PERSONAL HISTORY OF OTHER DISEASES OF THE CIRCULATORY SYSTEM: ICD-10-CM

## 2023-05-30 ENCOUNTER — APPOINTMENT (OUTPATIENT)
Dept: CARDIOLOGY | Facility: CLINIC | Age: 73
End: 2023-05-30
Payer: MEDICARE

## 2023-05-30 ENCOUNTER — NON-APPOINTMENT (OUTPATIENT)
Age: 73
End: 2023-05-30

## 2023-05-30 VITALS
BODY MASS INDEX: 24.17 KG/M2 | OXYGEN SATURATION: 97 % | DIASTOLIC BLOOD PRESSURE: 79 MMHG | SYSTOLIC BLOOD PRESSURE: 141 MMHG | WEIGHT: 128 LBS | HEART RATE: 83 BPM | HEIGHT: 61 IN

## 2023-05-30 DIAGNOSIS — R60.9 EDEMA, UNSPECIFIED: ICD-10-CM

## 2023-05-30 PROCEDURE — 93000 ELECTROCARDIOGRAM COMPLETE: CPT

## 2023-05-30 PROCEDURE — 99204 OFFICE O/P NEW MOD 45 MIN: CPT | Mod: 25

## 2023-05-30 RX ORDER — POTASSIUM CITRATE 15 MEQ/1
TABLET, EXTENDED RELEASE ORAL
Refills: 0 | Status: ACTIVE | COMMUNITY

## 2023-05-30 RX ORDER — CHOLECALCIFEROL (VITAMIN D3) 25 MCG
TABLET ORAL
Refills: 0 | Status: ACTIVE | COMMUNITY

## 2023-05-30 NOTE — HISTORY OF PRESENT ILLNESS
[FreeTextEntry1] : 73-year-old man\par Cardiology consultation requested because of peripheral edema and shortness of breath\par \par Mr. Schultz has no known heart disease.  There is no history of a myocardial infarction angina or congestive heart failure.\madie Ernst reports shortness of breath when going uphill occurring over the last several weeks without chest pain.  He was evaluated by Dr. Oliver for bladder stones and was found to have peripheral edema.  He recalled that his father who had valvular heart disease and heart failure also had peripheral edema.  This caused him to call for a cardiology appointment.  A 5/23 chest x-ray performed in evaluation of a cough was normal.\madie Ernst also complains of right  greater than left lower extremity exertional cramping/tingling.  Prior treatment for sciatica with physical therapy and epidural injection did not provide symptomatic relief.\par \par There is a prior history of hypertension and prediabetes.  He smoked only briefly in high school.  There is no history of alcohol abuse or illicit drug use.\madie Ernst presents today for cardiovascular evaluation.

## 2023-05-30 NOTE — PHYSICAL EXAM
[Normal Conjunctiva] : normal conjunctiva [Normal S1, S2] : normal S1, S2 [Clear Lung Fields] : clear lung fields [Soft] : abdomen soft [Normal Bowel Sounds] : normal bowel sounds [Normal Gait] : normal gait [No Rash] : no rash [No Focal Deficits] : no focal deficits [de-identified] : Appears fit in no distress lying flat [de-identified] : Normocephalic [de-identified] : No neck vein distention.  No carotid bruit [de-identified] : No murmur.  No gallop.  No diastolic sounds. [de-identified] : Full range of motion [de-identified] : trace right lower extremity  peripheral edema.  Stasis changes bilaterally.  Superficial varicosities.  Dorsalis pedis pulses +2 bilaterally.  Feet warm and well-perfused.  No ulcerations. [de-identified] : Pleasant

## 2023-05-30 NOTE — DISCUSSION/SUMMARY
[FreeTextEntry1] : Shortness of breath\par The working diagnosis is dyspnea on exertion secondary to deconditioning.  The history is consistent with this diagnosis.  The history physical findings and 5/23 chest x-ray do not support a diagnosis of pulmonary venous congestion/heart failure.  The absence of chest pain and normal 5/23 electrocardiogram showing no evidence of myocardial ischemia or infarction argue against myocardial ischemia/atherosclerotic heart disease as a cause for the patient's presentation.  A noninvasive cardiac evaluation would be helpful for management decisions.\par \par I have recommended the following\par a.  Low-salt heart healthy diet.  Regular aerobic exercise\par b.  Echocardiogram\par c.  Exercise treadmill ECG study\par \par \par Peripheral edema\par The working diagnosis is peripheral edema secondary to venous insufficiency exacerbated by superficial varicosities.  The history and physical findings are most consistent with this diagnosis.  The history, physical examination and 5/23 chest x-ray do not support a diagnosis of pulmonary venous congestion/heart failure.  There is a low suspicion for bilateral deep venous thrombophlebitis.\par \par I have recommended the following\par a.  Reassurance\par b.  Leg elevation\par \par \par \par Leg pain\par The working diagnosis is peripheral neuropathy exacerbated by prediabetes.  The exertional nature to the symptoms raise concern for claudication/peripheral arterial disease.  However the physical findings of strong dorsalis pedis pulses argue against this diagnosis.\par \par I have recommended the following\par a.  Lower extremity arterial Doppler examination\par \par \par \par Hypertension\par Hypertension is reportedly controlled on the present medical regimen.  In the setting of prediabetes ACE–I/ARB therapy is attractive.  The dose of losartan may be increased if required to maintain optimal levels.\par \par I have recommended the following\par a.  Low-salt heart healthy diet.  Regular aerobic exercise\par b.  Continue the present medical regimen\par c.  Increase losartan dose if required to maintain optimal levels as noted above.\par \par \par \par \par \par The diagnosis, prognosis, risks, options and alternatives were explained at length to the patient.  All questions were answered.  Issues discussed included shortness of breath congestive heart failure valvular heart disease atherosclerotic heart disease hypertension noninvasive cardiac testing diet and exercise.

## 2023-06-06 ENCOUNTER — NON-APPOINTMENT (OUTPATIENT)
Age: 73
End: 2023-06-06

## 2023-06-06 ENCOUNTER — APPOINTMENT (OUTPATIENT)
Dept: PULMONOLOGY | Facility: CLINIC | Age: 73
End: 2023-06-06
Payer: MEDICARE

## 2023-06-06 VITALS
SYSTOLIC BLOOD PRESSURE: 120 MMHG | DIASTOLIC BLOOD PRESSURE: 70 MMHG | WEIGHT: 126 LBS | OXYGEN SATURATION: 98 % | BODY MASS INDEX: 23.79 KG/M2 | HEART RATE: 75 BPM | HEIGHT: 61 IN

## 2023-06-06 PROCEDURE — 94010 BREATHING CAPACITY TEST: CPT

## 2023-06-06 PROCEDURE — 99204 OFFICE O/P NEW MOD 45 MIN: CPT | Mod: 25

## 2023-06-06 NOTE — HISTORY OF PRESENT ILLNESS
[FreeTextEntry1] : Evaluation for cough and mild shortness of breath. [de-identified] :   73-year-old male non-smoker.  No history of prior lung problems.  In April 2017 he states he caught a cold with a dry cough and chills.  The cough lingered for a good month or so.  The cough was worse at night.  He was seen initially and was told to have wheezing on the right side of his chest.  He was treated with albuterol and Tessalon Perles.  A chest x-ray was done which is normal.  The cough is much improved now and he has a minimal cough at night.  His breathing is generally okay and he is able to walk on level ground without difficulty.  He does get slightly short of breath up hills.  He also has some slight swelling of the ankle regions.  There is no current wheezing.  Past medical history was reviewed and includes a history of psoriasis on methotrexate and prior history of prostate cancer.  His spirometry currently is within normal limits.

## 2023-06-06 NOTE — ASSESSMENT
[FreeTextEntry1] :   Spirometry is within normal limits.  Cough likely due to a viral bronchitis which is now resolved.  There is no evidence of intrinsic lung disease.  The patient has been reassured

## 2023-06-07 ENCOUNTER — APPOINTMENT (OUTPATIENT)
Dept: RHEUMATOLOGY | Facility: CLINIC | Age: 73
End: 2023-06-07
Payer: MEDICARE

## 2023-06-07 VITALS
BODY MASS INDEX: 23.79 KG/M2 | WEIGHT: 126 LBS | HEART RATE: 74 BPM | DIASTOLIC BLOOD PRESSURE: 64 MMHG | HEIGHT: 61 IN | OXYGEN SATURATION: 97 % | SYSTOLIC BLOOD PRESSURE: 112 MMHG

## 2023-06-07 DIAGNOSIS — M72.0 PALMAR FASCIAL FIBROMATOSIS [DUPUYTREN]: ICD-10-CM

## 2023-06-07 DIAGNOSIS — M54.50 LOW BACK PAIN, UNSPECIFIED: ICD-10-CM

## 2023-06-07 PROCEDURE — 36415 COLL VENOUS BLD VENIPUNCTURE: CPT

## 2023-06-07 PROCEDURE — 99214 OFFICE O/P EST MOD 30 MIN: CPT | Mod: 25

## 2023-06-08 LAB
25(OH)D3 SERPL-MCNC: 17.9 NG/ML
ALBUMIN SERPL ELPH-MCNC: 4.4 G/DL
ALP BLD-CCNC: 93 U/L
ALT SERPL-CCNC: 19 U/L
ANION GAP SERPL CALC-SCNC: 13 MMOL/L
AST SERPL-CCNC: 27 U/L
BILIRUB SERPL-MCNC: 0.4 MG/DL
BUN SERPL-MCNC: 17 MG/DL
CALCIUM SERPL-MCNC: 9.4 MG/DL
CHLORIDE SERPL-SCNC: 103 MMOL/L
CO2 SERPL-SCNC: 24 MMOL/L
CREAT SERPL-MCNC: 0.93 MG/DL
CRP SERPL-MCNC: <3 MG/L
EGFR: 86 ML/MIN/1.73M2
ERYTHROCYTE [SEDIMENTATION RATE] IN BLOOD BY WESTERGREN METHOD: 25 MM/HR
GLUCOSE SERPL-MCNC: 104 MG/DL
POTASSIUM SERPL-SCNC: 4.4 MMOL/L
PROT SERPL-MCNC: 6.7 G/DL
SODIUM SERPL-SCNC: 140 MMOL/L

## 2023-06-10 PROBLEM — M54.50 LOW BACK PAIN: Status: ACTIVE | Noted: 2022-11-04

## 2023-06-10 PROBLEM — M72.0 DUPUYTREN'S CONTRACTURE OF RIGHT HAND: Status: ACTIVE | Noted: 2022-11-30

## 2023-06-10 NOTE — REASON FOR VISIT
[Follow-Up: _____] : a [unfilled] follow-up visit Patient's first and last name, , procedure, and correct site confirmed prior to the start of procedure.

## 2023-06-10 NOTE — HISTORY OF PRESENT ILLNESS
[FreeTextEntry1] : He takes MTX 6 tabs weekly + folic acid daily. \par \par He still has low back pain radiating down the side of his right leg.  No improvement with injection or PT.\par \par He did have an epidural, which gave 2 months of relief, but now it is back.\par \par He gets pain in his right PIP and left PIP2.\par \par He had left rotator cuff surgery but the arthritis is still there.  He sometimes gets shoulder pain.\par \par He had a few basal cell cancers frozen by Dr. Morales, and will have some removed.

## 2023-06-26 ENCOUNTER — APPOINTMENT (OUTPATIENT)
Dept: CARDIOLOGY | Facility: CLINIC | Age: 73
End: 2023-06-26
Payer: MEDICARE

## 2023-06-26 PROCEDURE — 93015 CV STRESS TEST SUPVJ I&R: CPT

## 2023-06-28 ENCOUNTER — RX RENEWAL (OUTPATIENT)
Age: 73
End: 2023-06-28

## 2023-06-29 ENCOUNTER — RESULT REVIEW (OUTPATIENT)
Age: 73
End: 2023-06-29

## 2023-07-02 ENCOUNTER — NON-APPOINTMENT (OUTPATIENT)
Age: 73
End: 2023-07-02

## 2023-07-02 DIAGNOSIS — Z86.79 PERSONAL HISTORY OF OTHER DISEASES OF THE CIRCULATORY SYSTEM: ICD-10-CM

## 2023-07-05 ENCOUNTER — APPOINTMENT (OUTPATIENT)
Dept: ENDOCRINOLOGY | Facility: CLINIC | Age: 73
End: 2023-07-05
Payer: MEDICARE

## 2023-07-05 ENCOUNTER — APPOINTMENT (OUTPATIENT)
Dept: INTERNAL MEDICINE | Facility: CLINIC | Age: 73
End: 2023-07-05
Payer: MEDICARE

## 2023-07-05 VITALS
BODY MASS INDEX: 23.98 KG/M2 | SYSTOLIC BLOOD PRESSURE: 138 MMHG | HEART RATE: 96 BPM | DIASTOLIC BLOOD PRESSURE: 64 MMHG | WEIGHT: 127 LBS | OXYGEN SATURATION: 99 % | HEIGHT: 61 IN

## 2023-07-05 VITALS
BODY MASS INDEX: 23.98 KG/M2 | SYSTOLIC BLOOD PRESSURE: 124 MMHG | OXYGEN SATURATION: 99 % | DIASTOLIC BLOOD PRESSURE: 70 MMHG | WEIGHT: 127 LBS | HEART RATE: 80 BPM | HEIGHT: 61 IN

## 2023-07-05 DIAGNOSIS — Z87.898 PERSONAL HISTORY OF OTHER SPECIFIED CONDITIONS: ICD-10-CM

## 2023-07-05 DIAGNOSIS — E88.81 METABOLIC SYNDROME: ICD-10-CM

## 2023-07-05 DIAGNOSIS — E55.9 VITAMIN D DEFICIENCY, UNSPECIFIED: ICD-10-CM

## 2023-07-05 DIAGNOSIS — Q23.8 OTHER CONGENITAL MALFORMATIONS OF AORTIC AND MITRAL VALVES: ICD-10-CM

## 2023-07-05 DIAGNOSIS — K21.9 GASTRO-ESOPHAGEAL REFLUX DISEASE W/OUT ESOPHAGITIS: ICD-10-CM

## 2023-07-05 DIAGNOSIS — N20.0 CALCULUS OF KIDNEY: ICD-10-CM

## 2023-07-05 DIAGNOSIS — M79.606 PAIN IN LEG, UNSPECIFIED: ICD-10-CM

## 2023-07-05 PROCEDURE — 99214 OFFICE O/P EST MOD 30 MIN: CPT | Mod: 25

## 2023-07-05 PROCEDURE — 99214 OFFICE O/P EST MOD 30 MIN: CPT

## 2023-07-05 PROCEDURE — 36415 COLL VENOUS BLD VENIPUNCTURE: CPT

## 2023-07-05 RX ORDER — BENZONATATE 100 MG/1
100 CAPSULE ORAL 3 TIMES DAILY
Qty: 15 | Refills: 0 | Status: DISCONTINUED | COMMUNITY
Start: 2023-05-23 | End: 2023-07-05

## 2023-07-05 NOTE — REVIEW OF SYSTEMS
[Fatigue] : fatigue [Mole Changes] : mole changes [Negative] : Heme/Lymph [Fever] : no fever [FreeTextEntry9] : right neck pain, right lower limb pain that moves upward.  [de-identified] : recent removal of BCC

## 2023-07-05 NOTE — HEALTH RISK ASSESSMENT
[No] : In the past 12 months have you used drugs other than those required for medical reasons? No [No falls in past year] : Patient reported no falls in the past year [0] : 2) Feeling down, depressed, or hopeless: Not at all (0) [Never] : Never [Patient reported colonoscopy was normal] : Patient reported colonoscopy was normal [HIV test declined] : HIV test declined [Hepatitis C test declined] : Hepatitis C test declined [None] : None [With Significant Other] : lives with significant other [With Family] : lives with family [Retired] : retired [] :  [Fully functional (bathing, dressing, toileting, transferring, walking, feeding)] : Fully functional (bathing, dressing, toileting, transferring, walking, feeding) [Fully functional (using the telephone, shopping, preparing meals, housekeeping, doing laundry, using] : Fully functional and needs no help or supervision to perform IADLs (using the telephone, shopping, preparing meals, housekeeping, doing laundry, using transportation, managing medications and managing finances) [de-identified] : none  [de-identified] : regular diet  [ITU1Mnpot] : 0 [Change in mental status noted] : No change in mental status noted [Reports changes in hearing] : Reports no changes in hearing [Reports changes in vision] : Reports no changes in vision [Reports changes in dental health] : Reports no changes in dental health [ColonoscopyDate] : 11/21 [ColonoscopyComments] : Dr Bauer, needs in 2026 [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

## 2023-07-05 NOTE — PHYSICAL EXAM
[No JVD] : no jugular venous distention [No Carotid Bruits] : no carotid bruits [No Lymphadenopathy] : no lymphadenopathy [Supple] : supple [Thyroid Normal, No Nodules] : the thyroid was normal and there were no nodules present [Coordination Grossly Intact] : coordination grossly intact [No Focal Deficits] : no focal deficits [Normal] : affect was normal and insight and judgment were intact [de-identified] : many moles

## 2023-07-05 NOTE — HISTORY OF PRESENT ILLNESS
[FreeTextEntry1] : follow-up  [de-identified] : Pt had a BCC removed 2 weeks ago from right leg by Dr Morales.\par He was to see Dr Oliver for a f/u. He had an ultrasound and blood work done.  Says it was very cold there and he causght a cold. He saw Dr Leone in May 2023. He was then sent to Dr Mackenzie, pulmonary for wheezing and he was told all was ok. \par He also had an CXR that was nl. \par He then saw Dr Oliver and he was told he has a new kidney stone. He was on potassium citrate for 3 weeks.  He then had some blood in the urine, had another sono and it seems like the stone was passed.  He is drinking a lot of water now and he is taking 2 potassium tabs at night \par He also had some swelling in legs. he saw Dr Barajas and had nl stress test and nl echo other than mild MVR and torn valve chord. \par \par Pt has pain in right ankle that moves upward. He had an injection from Dr Ortega but it did not help. This limits his exercise.  He had a lumbar epidural.

## 2023-07-07 LAB
ALBUMIN SERPL ELPH-MCNC: 4.4 G/DL
ALP BLD-CCNC: 105 U/L
ALT SERPL-CCNC: 15 U/L
ANION GAP SERPL CALC-SCNC: 14 MMOL/L
AST SERPL-CCNC: 20 U/L
BILIRUB SERPL-MCNC: 0.3 MG/DL
BUN SERPL-MCNC: 25 MG/DL
CALCIUM SERPL-MCNC: 9.3 MG/DL
CHLORIDE SERPL-SCNC: 105 MMOL/L
CHOLEST SERPL-MCNC: 145 MG/DL
CO2 SERPL-SCNC: 24 MMOL/L
CREAT SERPL-MCNC: 0.86 MG/DL
EGFR: 91 ML/MIN/1.73M2
ESTIMATED AVERAGE GLUCOSE: 128 MG/DL
GLUCOSE SERPL-MCNC: 148 MG/DL
HBA1C MFR BLD HPLC: 6.1 %
HCT VFR BLD CALC: 39.6 %
HDLC SERPL-MCNC: 41 MG/DL
HGB BLD-MCNC: 12.2 G/DL
INSULIN SERPL-MCNC: 67.9 UU/ML
LDLC SERPL CALC-MCNC: 84 MG/DL
MCHC RBC-ENTMCNC: 30.8 GM/DL
MCHC RBC-ENTMCNC: 32.5 PG
MCV RBC AUTO: 105.6 FL
NONHDLC SERPL-MCNC: 104 MG/DL
PHOSPHATE SERPL-MCNC: 2.8 MG/DL
PLATELET # BLD AUTO: 189 K/UL
POTASSIUM SERPL-SCNC: 3.9 MMOL/L
PROT SERPL-MCNC: 6.5 G/DL
RBC # BLD: 3.75 M/UL
RBC # FLD: 15.4 %
SODIUM SERPL-SCNC: 143 MMOL/L
T4 SERPL-MCNC: 7.2 UG/DL
T4 SERPL-MCNC: 8.3 UG/DL
THYROGLOB AB SERPL-ACNC: <20 IU/ML
THYROPEROXIDASE AB SERPL IA-ACNC: <10 IU/ML
TRIGL SERPL-MCNC: 96 MG/DL
TSH SERPL-ACNC: 1.88 UIU/ML
TTG IGA SER IA-ACNC: <1.2 U/ML
TTG IGA SER-ACNC: NEGATIVE
TTG IGG SER IA-ACNC: 1.2 U/ML
TTG IGG SER IA-ACNC: NEGATIVE
WBC # FLD AUTO: 5.61 K/UL

## 2023-07-07 NOTE — HISTORY OF PRESENT ILLNESS
[FreeTextEntry1] : Jul 05, 2023    in person\par \par PCP: Dr. Izzy Vital\par             Urol: Dr. Hurd p  in [facility]  ref to Dr. Oliver for bladder stone\par             Eyes: Dr. Twan Suresh, Jr - cataract - 2/2021\par             GI: Dr. Oscar Laura- ? a few years ago - told of polyps \par             Derm:  Dr. Feli Husain and Carmen - psoriasis - recent leg Bx \par             Rheum:  Dr. Nicki Velázquez \par            .\par            CC: Underactive thyroid (TPO and Tg Ab negative) Dx: ~12/2015  Dr. Driver\par                          3/26/18 U/S Thyroid - negative \par             (Prediabetes: 3/2016 A1c 6.4 12/12/2017 A1c 6.1, 3/2018: OGTT peak 180) 1/2/2020 A1c 6.0 %\par             (B12 deficiency, B12 177, MCV elevated - abs negative) \par             (spinal stenosis - Dr. Post) \par             (3/2017 - radical prostatectomy)\par             (6/2020 - asymptomatic Covid 19 positive ab) \par \par 74 yo.\par Visits for underactive thyroid, pre-diabetes.\par Medication list he brings shows:\par levothyroxine 25 mcg daily\par B12 1000 mg daily\par folic acid 1 mg daily\par Losartan 25 mg daily\par famotidine 40 mg HS\par methotrexate 2.5 mg - 6 tabs once a week \par \par 2018 US thyroid unremarkable:\par \par MPRESSION: Normal sized thyroid with unremarkable parenchymal echogenicity.\par ***Electronically Signed ***\par -----------------------------------------------\par Graham Correa MD              03/26/18\par \par \par : :\par Constitutional:  Alert, well nourished, healthy appearance, no acute distress \par Eyes:  No proptosis, no stare\par Thyroid:\par Pulmonary:  No respiratory distress, no accessory muscle use; normal rate and effort\par Cardiac:  Normal rate\par Vascular: \par Endocrine:  No stigmata of Cushing’s Syndrome\par Musculoskeletal:  Normal gait, no involuntary movements\par Neurology:  No tremors\par Affect/Mood/Psych:  Oriented x 3; normal affect, normal insight/judgement, normal mood \par .\par \par Imp:  Hypothyroidism well controlled on LT4 25 mcg daily.   1/6/23  TSH 2.93  \par 25 OH vit D remains low on 1000 iu TIW so advised to take every day -   (No Hx Fx)\par 1/6/23 A1c 6.0%   Will check again today.      \par \par \par \par \par Jul 05, 2022     in person\par \par PCP: Dr. Izzy Vital\par             Urol: Dr. Hurd p  in [facility]  ref to Dr. Oliver for bladder stone\par             Eyes: Dr. Twan Suresh,  - cataract - 2/2021\par             GI: Dr. Oscar Laura- ? a few years ago - told of polyps \par             Derm:  Dr. Feli Husain and Carmen - psoriasis - recent leg Bx \par             Rheum:  Dr. Nicki Velázquez \par            .\par            CC: Underactive thyroid (TPO and Tg Ab negative) Dx: ~12/2015  Dr. Driver\par                          3/26/18 U/S Thyroid - negative \par             (Prediabetes: 3/2016 A1c 6.4 12/12/2017 A1c 6.1, 3/2018: OGTT peak 180) 1/2/2020 A1c 6.0 %\par             (B12 deficiency, B12 177, MCV elevated - abs negative) \par             (spinal stenosis - Dr. Post) \par             (3/2017 - radical prostatectomy)\par             (6/2020 - asymptomatic Covid 19 positive ab) \par \par Visits for underactive thyroid, pre-diabetes.\par Medication list he brings shows:\par levothyroxine 25 mcg daily\par B12 1000 mg daily\par folic acid 1 mg daily\par Losartan 25 mg daily\par famotidine 40 mg HS\par methotrexate 2.5 mg - 6 tabs once a week \par \par : :\par Constitutional:  Alert, well nourished, healthy appearance, no acute distress \par Eyes:  No proptosis, no stare\par Thyroid:  normal to palpation   \par Pulmonary:  No respiratory distress, no accessory muscle use; normal rate and effort\par Cardiac:  Normal rate\par Vascular: \par Endocrine:  No stigmata of Cushing’s Syndrome\par Musculoskeletal:  Normal gait, no involuntary movements\par Neurology:  No tremors\par Affect/Mood/Psych:  Oriented x 3; normal affect, normal insight/judgement, normal mood \par .\par Impression:  \par Now taking water to which 3 drops lemon juice added a few times a week to prevent further stones.\par He is scheduled to f/u to Dr. Oliver in a few months.   \par Early hypothyroidism has been well controlled on LT4 25 mcg daily.\par His prediabetes has been diet controlled.\par \par Plan:  TFTs, A1c today   \par ROV six months.\par LT4 renewed.  \par  \par \par Jan 04, 2022    in person\par \par   PCP: Dr. Izzy Vital\par             Urol: Dr. Hurd p  in [facility]\par             Eyes: Dr. Twan Suresh, Jr - cataract - 2/2021\par             GI: Dr. Oscar Laura- ? a few years ago - told of polyps \par             Derm:  Dr. Feli Husain and Carmen - psoriasis - recent leg Bx \par             Rheum:  Dr. Nicki Velázquez \par            .\par            CC: Underactive thyroid (TPO and Tg Ab negative) Dx: ~12/2015  Dr. Driver\par                          3/26/18 U/S Thyroid - negative \par             (Prediabetes: 3/2016 A1c 6.4 12/12/2017 A1c 6.1, 3/2018: OGTT peak 180) 1/2/2020 A1c 6.0 %\par             (B12 deficiency, B12 177, MCV elevated) \par             (spinal stenosis - Dr. Post) \par             (3/2017 - radical prostatectomy)\par             (6/2020 - asymptomatic Covid 19 positive ab) \par \par Visits for underactive thyroid, pre-diabetes.\par Medication list he brings shows:\par levothyroxine 25 mcg daily\par B12 1000 mg daily\par folic acid 1 mg daily\par Losartan 25 mg daily\par famotidine 40 mg HS\par methotrexate 2.5 mg - 6 tabs once a week \par \par In June 2021, fructosamine within normal range.  \par TSH 2.91\par \par Impression:  Prediabetes and early hypothyroidism well controlled.\par Plan:  Diet Rx of prediabetes; updated A1c, BS today.\par TSH today.\par ROV in about six months.\par \par \par \par Jun 16, 2021    in person \par \par   PCP: Dr. Izzy Vital\par             Urol: Dr. Hurd p  in [place] \par             Eyes: Dr. Twan Suresh, Jr - cataract - 2/2021\par             GI: Dr. Oscar Laura- ? a few years ago - told of polyps \par             Derm:  Dr. Feli Husain and Carmen - psoriasis - recent leg Bx \par             Rheum:  Dr. Nicki Velázquez \par            .\par            CC: Underactive thyroid (TPO and Tg Ab negative) Dx: ~12/2015  Dr. Driver\par                          3/26/18 U/S Thyroid - negative \par             (Prediabetes: 3/2016 A1c 6.4 12/12/2017 A1c 6.1, 3/2018: OGTT peak 180) 1/2/2020 A1c 6.0 %\par             (B12 deficiency, B12 177, MCV elevated) \par             (spinal stenosis - Dr. Post) \par             (3/2017 - radical prostatectomy)\par             (6/2020 - asymptomatic Covid 19 positive ab) \par \par Visits for hypothyroidism and history of A1c up to 6.4 %\par \par : :\par Constitutional:  Alert, well nourished, healthy appearance, no acute distress \par Eyes:  No proptosis, no stare\par Thyroid:\par Pulmonary:  No respiratory distress, no accessory muscle use; normal rate and effort\par Cardiac:  Normal rate\par Vascular: \par Endocrine:  No stigmata of Cushing’s Syndrome\par Musculoskeletal:  Normal gait, no involuntary movements\par Neurology:  No tremors\par Affect/Mood/Psych:  Oriented x 3; normal affect, normal insight/judgement, normal mood \par .\par \par Impression:  Remains on levothyroxine 25 mcg daily.  December TSH 3.05\par B12 normal on OTC B12\par December A1c 6.0 %\par \par Plan:  Same Rx and ROV about 6 months.\par LT4 renewed. \par A1c, TSH today\par \par Dec 18, 2020    in person\par \par   PCP: Dr. Izzy Vital\par             Urol: Dr. Hurd p  in [place] \par             Eyes: Dr. Twan Suresh Jr (saw 3/2017 and again 1/2018)\par             GI: Dr. Oscar Laura- ? a few years ago - told of polyps \par             Derm:  Dr. Feli Husain - psoriasis\par             Rheum:  Dr. Nicki Velázquez \par            .\par            CC: Underactive thyroid (TPO and Tg Ab negative) Dx: ~12/2015  Dr. Driver\par                          3/26/18 U/S Thyroid - negative \par             (Prediabetes: 3/2016 A1c 6.4 12/12/2017 A1c 6.1, 3/2018: OGTT peak 180)\par             (B12 deficiency, B12 177, MCV elevated) \par             (spinal stenosis - Dr. Post) \par             (3/2017 - radical prostatectomy)\par             (6/2020 - asymptomatic Covid 19 positive ab) \par \par Visits for hypothyroidism and history of A1c up to 6.4 %\par June 2020 A1c was 6.1% and  mg/dl\par TSH 2.29 on LT4 25 mcg daily \par \par : :\par Constitutional:  Alert, well nourished, healthy appearance, no acute distress \par Eyes:  No proptosis, no stare\par Thyroid:\par Pulmonary:  No respiratory distress, no accessory muscle use; normal rate and effort\par Cardiac:  Normal rate\par Vascular: \par Endocrine:  No stigmata of Cushing’s Syndrome\par Musculoskeletal:  Normal gait, no involuntary movements\par Neurology:  No tremors\par Affect/Mood/Psych:  Oriented x 3; normal affect, normal insight/judgement, normal mood \par .\par  Impression:  Hypothyrodism well controlled.\par Diabetes:  diet controled.\par \par \par \par \par \par Pravin 15, 2020     in person\par \par   PCP: Dr. Izzy Vital\par             Urol: Dr. Hurd p  in [place] \par             Eyes: Dr. Twan Suresh Jr (saw 3/2017 and again 1/2018)\par             GI: Dr. Oscar Laura- ? a few years ago - told of polyps \par             Derm:  Dr. Feli Husain - psoriasis\par            .\par            CC: Underactive thyroid (TPO and Tg Ab negative) Dx: ~12/2015  Dr. Driver\par                          3/26/18 U/S Thyroid - negative \par             (Prediabetes: 3/2016 A1c 6.4 12/12/2017 A1c 6.1, 3/2018: OGTT peak 180)\par             (B12 deficiency, B12 177, MCV elevated) \par             (spinal stenosis - Dr. Post) \par             (3/2017 - radical prostatectomy)\par \par Visits for hypothyroidism and history of A1c up to 6.4 %\par Feels well.\par Walks almost every day, down to [place].\par Taking levothyroxine 25 mcg daily - initial Dx by Dr. Driver\par \par Impression:  Dong well.\par \par Plan:  TSH today, follow up A1c.\par ROV six months.  \par \par \par Dec 20, 2019\par \par   PCP: Dr. Izzy Vital\par             Urol: Dr. Hurd p  in [place] \par             Eyes: Dr. Twan Suresh Jr (saw 3/2017 and again 1/2018)\par             GI: Dr. Oscar Laura- ? a few years ago - told of polyps \par             Derm:  Dr. Feli Husain\par            .\par            CC: Underactive thyroid (TPO and Tg Ab negative) Dx: ~12/2015  Dr. Driver\par                          3/26/18 U/S Thyroid - negative \par             (Prediabetes: 3/2016 A1c 6.4 12/12/2017 A1c 6.1, 3/2018: OGTT peak 180)\par             (B12 deficiency, B12 177, MCV elevated) \par             (spinal stenosis - Dr. Post) \par             (3/2017 - radical prostatectomy)\par \par 68 yo visits for hypothyroidism, prediabetes.  Saw Dr. Vital today.\par On B12 for B12 deficiency.\par Levothyroxine 25 mcg for very early hypothyroidism.  \par \par Lab test in June 2019 iincluded\par TSH 2.84 (on 25 mcg of LT4)\par A1c 6.2 %\par \par \par Impression:  Doing well.\par Plan:  Review results of updated thyroid function tests, A1c, B12, folate.\par ROV several months.  \par \par \par \par \par \par \par Radha 3, 2019\par \par      .\par            PCP: Dr. Izzy Vital\par             Urol: Dr. Stacy Sotomayor\par             Eyes: Dr. Twan Suresh Jr (saw 3/2017 and again 1/2018)\par             GI: Dr. Oscar Laura- ? a few years ago - told of polyps \par             Derm:  Dr. Feli Husain\par            .\par            CC: Underactive thyroid (TPO and Tg Ab negative) Dx: ~12/2015  Dr. Driver\par                          3/26/18 U/S Thyroid - negative \par             (Prediabetes: 3/2016 A1c 6.4 12/12/2017 A1c 6.1, 3/2018: OGTT peak 180)\par             (B12 deficiency, B12 177)\par             (spinal stenosis - Dr. Post) \par             (3/2017 - radical prostatectomy)\par \par Lab tests from December 3, 2018 at [facility] included\par TSH 3.15\par HbA1c 6.0 %\par He reports that blood pressure has been running on the low side..\par \par His med list iincludes\par Losartan 50 mg daily\par Levothyroxine 25 mcg daily and\par Vitamin b12 1000 mcg daily.\par \par Impression: : doiing well\par TFTs today and ROV 6 months.   \par \par \par \par \par December 3, 2018\par \par            .\par            PCP: Dr. Izzy Vital\par             Urol: Dr. Stacy Sotomayor\par             Eyes: Dr. Demetrice Jr (saw 3/2017 and again 1/2018)\par             GI: Dr. Laura- ? a few years ago - told of polyps \par             Derm:  Dr. MELODIE Husain\par            .\par            CC: Underactive thyroid (TPO and Tg Ab negative)\par             3/26/18 U/S Thyroid - negative \par             (Prediabetes: 3/2016 A1c 6.4 12/12/2017 A1c 6.1, 3/2018: OGTT peak 180)\par             (B12 deficiency, B12 177)\par             (spinal stenosis - Dr. Post) \par             (3/2017 - radical prostatectomy)\par \par Has early hypothyroidism.  Initial Rx with levothyroxine did not go well:  he felt poorly and stopped.\par Now taking 25 mcg daily and tolerating it well.\par He was reluctant to take vitamin B12 for blood level of 177, but now takes it regularly.\par A1c as high as 6.4 % indicating prediabetes.\par .\par Imrpession:  He feels well and is doing well.\par Plan:  As arranged by Dr. Vital he will have A1c, TFTs checked today.\par ROV ~6 months.  \par \par \par Previous notes from [facility] appended below:\par \par \par  April 9, 2018\par            .\par            PCP: Dr. Izzy Vital\par             Urol: Dr. Stacy Sotomayor\par             Eyes: Dr. Demetrice Jr (saw 3/2017 and again 1/2018)\par             GI: Dr. Laura- ? a few years ago - told of polyps \par            .\par            CC: Underactive thyroid (TPO and Tg Ab negative)\par             3/26/18 U/S Thyroid - negative \par             (Prediabetes: 3/2016 A1c 6.4 12/12/2017 A1c 6.1, 3/2018: OGTT peak 180)\par             (B12 deficiency) \par             (spinal stenosis - Dr. Post) \par             (3/2017 - radical prostatectomy)\par            .\par            With some negotiating, patient agreed to take vitamin B12 1000 mcg daily and eventually agreed to take levothyroxine 25 mcg every other day. He is also on Losartan.\par            .\par            He recently saw Dr. Sotomayor and lab tests from 3/27/2018 include undetectable TSA.\par            Labs from 3/26/2018 show\par            TSH 3.62, down from 6.25 in December\par            December B12 level was 1119 \par            Ultrasound thryoid was very reassuring.\par            A1c had gone from 5.7 to 6.1 so he went for formal OGTT:\par            FBS wqs 105 and peak (2 hr) sugar was 180 - also consistant with\par            diagnosis of prediabetes.\par            .\par            Impression Early hypothyroidism well controlled on levothyroxine 25 mcg QOD. He is aware of diagnosis of prediabetes. As he is reluctant to do self blood glucose monitoring at this time, A1c once-twice a year and occasional fasting and random glucose levels should suffice for now\par            Encourage walking.\par            .\par            ROV here when he returns for CPX, likely in December. \par            .\par            .\par            ==\par            .\par            December 15, 2017\par            .\par            .\par            PCP: Dr. Izzy Vital\par             Urol: Dr. Stacy Sotomayor\par             Eyes: Dr. Demetrice Jr (saw 3/2017 and again 1/2018)\par             GI: Dr. Laura- ? a few years ago - told of polyps \par            .\par            CC: Underactive thyroid (TPO and Tg Ab negative)\par             (Prediabetes)\par             (B12 deficiency) \par             (spinal stenosis - Dr. Post) \par             (3/2017 - radical prostatectomy)\par            .\par            66 yo former [employer] employee.\par            .\par            He returns regarding early ("pre-") hypothyroidism and prediabetes.\par            He does not like to take medication. He has well documented B12 deficiency and it took a lot of convincing to get him to take po B12, but he does take it now.\par            He did try taking levothyroxine in the past, but did not like how it made him feel.\par            He knows he has prediabetes, but is not quite ready to start self blood glucose monitoring. \par            .\par            Lab tests (Rob) kindly provided by Dr. Vital from\par            12/12/2017 include\par            free T4 0.8\par            TSH 6.25 **\par            B12 1119\par            glucose 105 mg/dl (fasting)\par            A1c 6.1 % (5.7 in May)\par            .\par            Impression: Slowly stuttering into hypothyroidism.\par            He would also like ultrasound of thyroid. \par            .\par            Plan: He is willing to try 25 mcg levothyroxine. I will start him on it every other day. Ultrasound thyroid. \par            .\par            ROV in February. Will address the sugars then. Thank you. -jh\par            .\par            ==\par            .\par            May 8, 2017\par            .\par            PCP: Dr. Izzy Vital\par             Urol: Dr. Stacy Sotomayor\par            .\par            CC: Underactive thyroid. \par             (Prediabetes)\par             (B12 deficiency) \par             (spinal stenosis - Dr. Post) \par            .\par            Since last visit, underwent radical prostatectomy March 2017\par            Feels well.\par            .\par            November Thyroid and B12 levels in good range. \par            .\par            Plan: A1c, TSH.\par            ROV 8 months. \par            .\par            ==\par            .\par            November 21, 2016\par            .\par            PCP: Dr. Maik Driver\par            .\par            CC: Underactive thyroid. \par             (Prediabetes)\par             (B12 deficiency) \par             (spinal stenosis - Dr. Post) \par            .\par            On po B12, his B12 level went from 170's to 700's.\par            He stopped his thyroid pill.\par            His last A1c 6.4 % (pre-diabetes).\par            .\par            Plan: Check\par            B12\par            A1c\par            TSH\par            ROV 6-8 months.\par            .\par            .\par            ==\par            .\par            July 20, 2016\par            .\par            PCP: Dr. Maik Driver\par            .\par            CC: Underactive thyroid. \par             (Prediabetes)\par             (B12 deficiency) \par             (spinal stenosis - Dr. Post) \par            .\par            Because of L shoulder pain, recently saw Dr. Castro and received benefit from Voltanen 75 mg with Omeprazole \par            .\par            Labs in March included\par             mg/dl\par            TSH 2.65\par            B12 171 (before starting po B12) \par            HbA1c 6.4 % **\par            .\par            Impression: TSH is remaining within normal limits even though he chose not to take levothyroxine.\par            .\par            B12 is being treated. \par            .\par            A1c indicates pre-diabetes - counsedled today and in the future will consider monitoring glucose levels. \par            .\par            ==\par            .\par            May 19, 2016\par            .\par            PCP: Dr. Maik Driver\par            .\par            CC: Underactive thyroid. \par             (spinal stenosis - Dr. Post) \par            .\par            He did not like the thyroid medication, so he stopped it (!)\par            He is taking\par            Vitamin B12 1000 mcg daily and\par            Telmisartan (Micardis) 20 mg daily. \par            .\par            He went for lab tests (Rob)\par            3/17/2016\par            vitamin B12 171 *** (despite po B12 x 1 month)\par            TSH 2.65\par            .\par            .\par            Impression: "I feel pretty good...."\par            TSH has remained normal, even though he has stopped the\par            levothyroxine.\par            However, despite taking 1000 mcg of PO B12 for one month, the level is still low. \par            .\par            Plan: Advised him he may benefit from injectable B12\par            He will discuss with Dr. Driver. \par            .\par            ==\par            .\par            March 17, 2016\par            .\par            PCP: Dr. Maik Driver\par            .\par            CC: Underactive thyroid. \par             (spinal stenosis - Dr. Post) \par            .\par            Currently tolerating thyroid medication.\par            .\par            Plan: Continue to monitor TFTs on levothyroxine and monitor his symptoms. \par            .\par            ==\par            .\par            January 18, 2016\par            .\par            PCP: Dr. Maik Driver\par            .\par            CC: Underactive thyroid. \par             (spinal stenosis - Dr. Post) \par            .\par            Accompanied by his daughter, Sarah.\par            His wife, Osiris, visits here.\par            .\par            December he started on levothyroxine 50 mcg daily.\par            He noted sweating and dose changed to 75 mcg.\par            Took last levothyroxine a week ago and his leg and back pain and sweats went away. \par            He is on Telmisartan 20 mg daily for elevated blood pressure. \par            .\par            Impression: Reason for symptoms not clear.\par            .\par            Plan: Updated labs today and obtain old records (offices closed for [facility] Day). TOV 6-8 weeks.

## 2023-07-09 LAB
FERRITIN SERPL-MCNC: 80 NG/ML
FOLATE SERPL-MCNC: 19.3 NG/ML
IRON SATN MFR SERPL: 18 %
IRON SERPL-MCNC: 53 UG/DL
TIBC SERPL-MCNC: 297 UG/DL
UIBC SERPL-MCNC: 244 UG/DL
VIT B12 SERPL-MCNC: 1047 PG/ML

## 2023-07-12 ENCOUNTER — APPOINTMENT (OUTPATIENT)
Dept: PAIN MANAGEMENT | Facility: CLINIC | Age: 73
End: 2023-07-12

## 2023-07-21 ENCOUNTER — APPOINTMENT (OUTPATIENT)
Dept: PAIN MANAGEMENT | Facility: CLINIC | Age: 73
End: 2023-07-21
Payer: MEDICARE

## 2023-07-21 VITALS
DIASTOLIC BLOOD PRESSURE: 76 MMHG | SYSTOLIC BLOOD PRESSURE: 146 MMHG | BODY MASS INDEX: 23.98 KG/M2 | HEIGHT: 61 IN | WEIGHT: 127 LBS

## 2023-07-21 PROCEDURE — 99204 OFFICE O/P NEW MOD 45 MIN: CPT

## 2023-07-21 NOTE — REVIEW OF SYSTEMS
[Back Pain] : back pain [Radiating Pain] : radiating pain [Numbness] : numbness [Negative] : Heme/Lymph

## 2023-07-21 NOTE — ASSESSMENT
[FreeTextEntry1] : >> Imaging and Other Studies\par \par I personally reviewed the relevant imaging.  Discussed and explained to patient the likely source of pathology and pain.  Questions answered. MRI \par \par >> Therapy and Other Modalities\par \par consider PT\par \par >> Medications\par \par acetaminophen 650mg q8h prn pain (caution <3g daily)\par  \par >> Interventions\par \par given efficacy of previous intervention that is >80% improvement in pain and improved ability to perform adls, and return of pain despite conservative treatment, will schedule repeat L5-S1 interlaminar epidural steroid injection r/b/a discussed\par \par \par >> Consults\par \par na\par \par >> Discussion of Risks/Benefits/Alternatives\par \par 	>Regarding any scheduled procedures:\par \par I have discussed in detail with the patient that any interventional pain procedure is associated with potential risks.  The procedure may include an injection of steroids and potentially other medications (local anesthetic and normal saline) into the epidural space or surrounding tissue of the spine.  There are significant risks of this procedure which include and are not limited to infection, bleeding, worsening pain, dural puncture leading to postdural puncture headache, nerve damage, spinal cord injury, paralysis, stroke, and death.  \par \par There is a chance that the procedure does not improve their pain.  \par \par There are risks associated with the steroid being absorbed into the body systemically.  These include dysphoria, difficulty sleeping, mood swings and personality changes.  Premenopausal women may notice an irregularity in her menstrual cycle for 2-3 months following the injection.  Steroids can specifically affect patients with hypertension, diabetes, and peptic ulcers.  The procedure may cause a temporary increase in blood pressure and blood pressure, and may adversely affect a peptic ulcer.  Other, more rare complications, include avascular necrosis of joints, glaucoma and worsening of osteoporosis. \par \par I have discussed the risks of the procedure at length with the patient, and the potential benefits of pain relief.  I have offered alternatives to the procedure.  All questions were answered.  \par \par The patient expressed understanding and wishes to proceed with the procedure.\par \par 	>Regarding COVID19 Pandemic: \par \par Any planned interventional pain procedure are scheduled because further delay may cause harm or negative outcome to patient.  The goal in performing this procedure is to avoid deterioration of function, emergency room visits (which increases exposure) and reliance on opioids.  \par \par r/b/a discussed with patient, lack of evidence to conclusively determine whether pain management procedures have any positive or negative impact on the possibility of mir the virus and/or development of any sequelae. \par \par Patient counselled regarding timing steroid based intervention 2 weeks before or after COVID-19 vaccine administration to avoid any interaction or affect on efficacy of vaccination\par \par Patient demonstrates understanding\par \par Informed patient that risks associated with the COVID-19 infection.  Informed patient steps taken to limit the risks.  We are implementing safety precautions and following protocols consistent with the CDC and state recommendations. All patients and staff will be checked for fever or signs of illness upon entry to the facility. We will limit our steroid dose to the lowest effective therapeutic dose or in some cases steroids will not be injected at all. \par \par Patient agrees to proceed\par \par >> Conclusion\par \par The above diagnosis and treatment plan is medically reasonable and necessary based on the patient encounter \par There were no barriers to communication.\par Informed patient that I would be available for any additional questions.\par Patient was instructed to call with any worsening symptoms including severe pain, new numbness/weakness, or changes in the bowel/bladder function. \par Discussed role of nsaids in pain management and all relevant risks, if patient is continuing to require after 4 weeks the patient should f/u for alternative treatment. \par Instructed patient to maintain pain diary to monitor pain level, mobility, and function.\par \par The referring provider was informed of the above diagnosis and treatment plan.\par

## 2023-07-21 NOTE — PHYSICAL EXAM
[Normal muscle bulk without asymmetry] : normal muscle bulk without asymmetry [Facet Tenderness] : facet tenderness [] : Motor: [NL] : normal and symmetric bilaterally [Normal] : Normal affect

## 2023-07-21 NOTE — REASON FOR VISIT
[Initial Consultation] : an initial pain management consultation [FreeTextEntry2] : Referred by Dr. Tuttle for back pain

## 2023-07-21 NOTE — HISTORY OF PRESENT ILLNESS
[Back Pain] : back pain [___ mths] : [unfilled] month(s) ago [Constant] : constant [8] : a maximum pain level of 8/10 [Dull] : dull [Burning] : burning [Shooting] : shooting [Standing] : standing [Transitioning] : transitioning [Bending] : bending [Medications] : medications [Other: ___] : [unfilled] [FreeTextEntry1] : HPI\par \par  \par \par Mr. LATISHA WEBSTER is a 73 year M with pmhx of prostate cancer (s/p prostatectomy 2017- mild urinary incontinence since), BCC (s/p rsxn Dr Joya), psoriasis (methotrexate), HTN, hypothyroid. Complains of bilateral lower extremity numbness/tingling and pain to posterior leg when walking. Began 1 year ago. Had L4-5 MARIE Dec `22 with Dr Ortega with relief x 1 month. Was partaking in PT 3x per week pre and post injection, unsure if helpful. Tylenol, Ibuprofen with no relief. Tried Gabapentin with adverse effects. Denies any additional weakness, numbness, bowel/bladder dysfunction.\par \par \par \par  Previous and current pain medications/doses/effects: Tylenol, Ibuprofen\par \par  \par \par Previous Pain Treatments: Physical therapy\par \par  \par \par Previous Pain Injections:  L4-5 MARIE Dec `22-  Dr Ortega \par \par  \par \par Previous Diagnostic Studies/Images:\par 11/21/22\par Exam: MRI LUMBAR SPINE \par Order#: [id number] \par \par \par \par EXAM: MRI lumbar spine without contrast \par \par INDICATION: Lumbar back pain \par \par TECHNIQUE: MR exam of lumbar spine without contrast utilizing coronal T2, sagittal T2 STIR, \par sagittal T2, sagittal T1, axial T1, axial T2 sequences \par \par COMPARISON: October 9, 2014 MRI lumbar spine \par \par \par FINDINGS: \par \par \par \par There appear to be 5 nonrib-bearing lumbar type vertebral bodies. For purposes of this report, \par vertebral body at level of the iliolumbar ligament will be designated as L5. Inferiormost well-\par formed intervertebral disc space will be designated as L5-S1. No MR evidence for transitional \par lumbosacral anatomy. \par \par \par  Lumbar lordosis is maintained. No significant spondylolisthesis. Vertebral body heights are \par maintained. There are multilevel degenerative endplate changes with osteophyte formation, inter\par vertebral disc space narrowing/desiccation, Schmorl's nodes and endplate irregularity. Vertebral \par body bone marrow signal within normal limits. No abnormal cord signal identified. Conus terminates \par at L1-L2. No significant periarticular or paraspinal soft tissue edema is identified. No suspicious \par expansile or destructive osseous lesion identified. Paraspinal soft tissues are unremarkable. \par \par  There are multilevel findings as follows with comparison made to 2014 exam: \par \par \par \par  T12-L1: No significant canal or foraminal stenosis. Unchanged \par \par  L1-L2: No significant canal or foraminal stenosis. Unchanged \par \par  L2-L3: Disc bulge, bilateral facet arthropathy, ligamentous hypertrophy contributing to mild canal \par stenosis and mild bilateral foraminal stenosis. Unchanged \par \par  L3-L4: Disc bulge, bilateral facet arthropathy, ligamentous hypertrophy contributing to moderate to\par severe canal stenosis and moderate bilateral foraminal stenosis. \par \par  L4-L5: Disc bulge, bilateral facet arthropathy, ligamentous hypertrophy contributing to severe \par canal stenosis and moderate to severe bilateral foraminal stenosis. Severe bilateral lateral recess \par stenosis. Findings are progressed. \par  L5-S1: Disc bulge, bilateral facet arthropathy, ligamentous hypertrophy contributing to no \par significant canal stenosis and mild bilateral foraminal stenosis. Unchanged \par \par \par \par \par  IMPRESSION: \par \par  Multilevel lumbar spondylitic changes as detailed above most notable at the L3-L4, L4-L5 levels \par where there is moderate severe canal stenosis, moderate to severe bilateral foraminal stenosis, \par involving exiting L3, L4 nerve roots, respectively and moderate to severe bilateral lateral recess \par stenosis, involving descending L4 and L5 nerve roots, respectively. Findings at these levels have \par progressed when compared to the 2014 exam. Additional levels are not significantly changed.. \par \par \par  If clinicians have any questions/need for clarification regarding this report, Dr. Kaleb Metcalf can\par be best reached via the patient secure hospital email system  \par \par \par  [FreeTextEntry2] : 24 [FreeTextEntry7] : Lower back , radiates down right leg [de-identified] : tri and hermelindo [FreeTextEntry4] : tylenol

## 2023-07-27 ENCOUNTER — APPOINTMENT (OUTPATIENT)
Dept: PAIN MANAGEMENT | Facility: CLINIC | Age: 73
End: 2023-07-27
Payer: MEDICARE

## 2023-07-27 VITALS
DIASTOLIC BLOOD PRESSURE: 77 MMHG | WEIGHT: 127 LBS | HEART RATE: 77 BPM | OXYGEN SATURATION: 97 % | RESPIRATION RATE: 16 BRPM | BODY MASS INDEX: 23.98 KG/M2 | HEIGHT: 61 IN | SYSTOLIC BLOOD PRESSURE: 134 MMHG | TEMPERATURE: 98 F

## 2023-07-27 PROCEDURE — 20611 DRAIN/INJ JOINT/BURSA W/US: CPT | Mod: RT

## 2023-07-27 RX ADMIN — Medication %: at 00:00

## 2023-07-27 RX ADMIN — TRIAMCINOLONE ACETONIDE 0 MG/ML: 80 INJECTION, SUSPENSION INTRA-ARTICULAR; INTRAMUSCULAR at 00:00

## 2023-07-27 RX ADMIN — IOHEXOL 0 MG/ML: 180 INJECTION INTRAVENOUS at 00:00

## 2023-08-10 ENCOUNTER — APPOINTMENT (OUTPATIENT)
Dept: PAIN MANAGEMENT | Facility: CLINIC | Age: 73
End: 2023-08-10
Payer: MEDICARE

## 2023-08-10 VITALS
DIASTOLIC BLOOD PRESSURE: 68 MMHG | OXYGEN SATURATION: 100 % | HEIGHT: 61 IN | WEIGHT: 127 LBS | SYSTOLIC BLOOD PRESSURE: 126 MMHG | BODY MASS INDEX: 23.98 KG/M2 | HEART RATE: 64 BPM

## 2023-08-10 PROCEDURE — 99214 OFFICE O/P EST MOD 30 MIN: CPT

## 2023-08-10 NOTE — HISTORY OF PRESENT ILLNESS
[Back Pain] : back pain [___ mths] : [unfilled] month(s) ago [Constant] : constant [Dull] : dull [Burning] : burning [Shooting] : shooting [Standing] : standing [Transitioning] : transitioning [Bending] : bending [Medications] : medications [Other: ___] : [unfilled] [8] : 3. What number best describes how, during the past week, pain has interfered with your general activity? 8/10 pain [FreeTextEntry1] : Interval Note: sp L5-S1 interlaminar epidural steroid injection on 7/27/23 with significant improvement in lower extremity numbness/tingling and pain to posterior leg. Pain level at rest is 0 and 3/10 with activity. Was previously 8/10. Denies any additional weakness, numbness, bowel/bladder dysfunction.    HPI     Mr. LATISHA WEBSTER is a 73 year M with pmhx of prostate cancer (s/p prostatectomy 2017- mild urinary incontinence since), BCC (s/p rsxn Dr Joya), psoriasis (methotrexate), HTN, hypothyroid. Complains of bilateral lower extremity numbness/tingling and pain to posterior leg when walking. Began 1 year ago. Had L4-5 MARIE Dec `22 with Dr Ortega with relief x 1 month. Was partaking in PT 3x per week pre and post injection, unsure if helpful. Tylenol, Ibuprofen with no relief. Tried Gabapentin with adverse effects. Denies any additional weakness, numbness, bowel/bladder dysfunction.     Previous and current pain medications/doses/effects: Tylenol, Ibuprofen     Previous Pain Treatments: Physical therapy     Previous Pain Injections: L5-S1 interlaminar epidural steroid injection 7/27/23   L4-5 MARIE Dec `22-  Dr Ortega      Previous Diagnostic Studies/Images: 11/21/22 Exam: MRI LUMBAR SPINE  Order#: [id number]     EXAM: MRI lumbar spine without contrast   INDICATION: Lumbar back pain   TECHNIQUE: MR exam of lumbar spine without contrast utilizing coronal T2, sagittal T2 STIR,  sagittal T2, sagittal T1, axial T1, axial T2 sequences   COMPARISON: October 9, 2014 MRI lumbar spine    FINDINGS:     There appear to be 5 nonrib-bearing lumbar type vertebral bodies. For purposes of this report,  vertebral body at level of the iliolumbar ligament will be designated as L5. Inferiormost well- formed intervertebral disc space will be designated as L5-S1. No MR evidence for transitional  lumbosacral anatomy.     Lumbar lordosis is maintained. No significant spondylolisthesis. Vertebral body heights are  maintained. There are multilevel degenerative endplate changes with osteophyte formation, inter vertebral disc space narrowing/desiccation, Schmorl's nodes and endplate irregularity. Vertebral  body bone marrow signal within normal limits. No abnormal cord signal identified. Conus terminates  at L1-L2. No significant periarticular or paraspinal soft tissue edema is identified. No suspicious  expansile or destructive osseous lesion identified. Paraspinal soft tissues are unremarkable.    There are multilevel findings as follows with comparison made to 2014 exam:      T12-L1: No significant canal or foraminal stenosis. Unchanged    L1-L2: No significant canal or foraminal stenosis. Unchanged    L2-L3: Disc bulge, bilateral facet arthropathy, ligamentous hypertrophy contributing to mild canal  stenosis and mild bilateral foraminal stenosis. Unchanged    L3-L4: Disc bulge, bilateral facet arthropathy, ligamentous hypertrophy contributing to moderate to severe canal stenosis and moderate bilateral foraminal stenosis.    L4-L5: Disc bulge, bilateral facet arthropathy, ligamentous hypertrophy contributing to severe  canal stenosis and moderate to severe bilateral foraminal stenosis. Severe bilateral lateral recess  stenosis. Findings are progressed.   L5-S1: Disc bulge, bilateral facet arthropathy, ligamentous hypertrophy contributing to no  significant canal stenosis and mild bilateral foraminal stenosis. Unchanged       IMPRESSION:    Multilevel lumbar spondylitic changes as detailed above most notable at the L3-L4, L4-L5 levels  where there is moderate severe canal stenosis, moderate to severe bilateral foraminal stenosis,  involving exiting L3, L4 nerve roots, respectively and moderate to severe bilateral lateral recess  stenosis, involving descending L4 and L5 nerve roots, respectively. Findings at these levels have  progressed when compared to the 2014 exam. Additional levels are not significantly changed..     If clinicians have any questions/need for clarification regarding this report, Dr. Kaleb Metcalf can be best reached via the patient secure hospital [facility] system      [FreeTextEntry7] : Lower back , radiates down right leg [de-identified] : tri and hermelindo [FreeTextEntry4] : tylenol [FreeTextEntry2] : 24

## 2023-08-10 NOTE — ASSESSMENT
[FreeTextEntry1] : >> Imaging and Other Studies  I personally reviewed the relevant imaging.  Discussed and explained to patient the likely source of pathology and pain.  Questions answered. MRI   >> Therapy and Other Modalities start PT - referral provided  >> Medications  acetaminophen 650mg q8h prn pain (caution <3g daily)   >> Interventions  given efficacy of previous intervention that is >80% improvement in pain and improved ability to perform adls, and return of pain despite conservative treatment, sp repeat L5-S1 interlaminar epidural steroid injection with significant improvement   >> Consults  na  >> Discussion of Risks/Benefits/Alternatives  	>Regarding any scheduled procedures:  I have discussed in detail with the patient that any interventional pain procedure is associated with potential risks.  The procedure may include an injection of steroids and potentially other medications (local anesthetic and normal saline) into the epidural space or surrounding tissue of the spine.  There are significant risks of this procedure which include and are not limited to infection, bleeding, worsening pain, dural puncture leading to postdural puncture headache, nerve damage, spinal cord injury, paralysis, stroke, and death.    There is a chance that the procedure does not improve their pain.    There are risks associated with the steroid being absorbed into the body systemically.  These include dysphoria, difficulty sleeping, mood swings and personality changes.  Premenopausal women may notice an irregularity in her menstrual cycle for 2-3 months following the injection.  Steroids can specifically affect patients with hypertension, diabetes, and peptic ulcers.  The procedure may cause a temporary increase in blood pressure and blood pressure, and may adversely affect a peptic ulcer.  Other, more rare complications, include avascular necrosis of joints, glaucoma and worsening of osteoporosis.   I have discussed the risks of the procedure at length with the patient, and the potential benefits of pain relief.  I have offered alternatives to the procedure.  All questions were answered.    The patient expressed understanding and wishes to proceed with the procedure.  	>Regarding COVID19 Pandemic:   Any planned interventional pain procedure are scheduled because further delay may cause harm or negative outcome to patient.  The goal in performing this procedure is to avoid deterioration of function, emergency room visits (which increases exposure) and reliance on opioids.    r/b/a discussed with patient, lack of evidence to conclusively determine whether pain management procedures have any positive or negative impact on the possibility of mir the virus and/or development of any sequelae.   Patient counselled regarding timing steroid based intervention 2 weeks before or after COVID-19 vaccine administration to avoid any interaction or affect on efficacy of vaccination  Patient demonstrates understanding  Informed patient that risks associated with the COVID-19 infection.  Informed patient steps taken to limit the risks.  We are implementing safety precautions and following protocols consistent with the CDC and state recommendations. All patients and staff will be checked for fever or signs of illness upon entry to the facility. We will limit our steroid dose to the lowest effective therapeutic dose or in some cases steroids will not be injected at all.   Patient agrees to proceed  >> Conclusion  The above diagnosis and treatment plan is medically reasonable and necessary based on the patient encounter  There were no barriers to communication. Informed patient that I would be available for any additional questions. Patient was instructed to call with any worsening symptoms including severe pain, new numbness/weakness, or changes in the bowel/bladder function.  Discussed role of nsaids in pain management and all relevant risks, if patient is continuing to require after 4 weeks the patient should f/u for alternative treatment.  Instructed patient to maintain pain diary to monitor pain level, mobility, and function.

## 2023-09-12 ENCOUNTER — NON-APPOINTMENT (OUTPATIENT)
Age: 73
End: 2023-09-12

## 2023-09-14 ENCOUNTER — NON-APPOINTMENT (OUTPATIENT)
Age: 73
End: 2023-09-14

## 2023-09-22 ENCOUNTER — APPOINTMENT (OUTPATIENT)
Dept: PAIN MANAGEMENT | Facility: CLINIC | Age: 73
End: 2023-09-22
Payer: MEDICARE

## 2023-09-22 VITALS
SYSTOLIC BLOOD PRESSURE: 124 MMHG | BODY MASS INDEX: 23.98 KG/M2 | WEIGHT: 127 LBS | HEIGHT: 61 IN | DIASTOLIC BLOOD PRESSURE: 71 MMHG

## 2023-09-22 PROCEDURE — 99214 OFFICE O/P EST MOD 30 MIN: CPT

## 2023-09-26 ENCOUNTER — APPOINTMENT (OUTPATIENT)
Dept: INTERNAL MEDICINE | Facility: CLINIC | Age: 73
End: 2023-09-26
Payer: MEDICARE

## 2023-09-26 VITALS
BODY MASS INDEX: 23.6 KG/M2 | OXYGEN SATURATION: 97 % | WEIGHT: 125 LBS | DIASTOLIC BLOOD PRESSURE: 70 MMHG | SYSTOLIC BLOOD PRESSURE: 116 MMHG | HEIGHT: 61 IN | HEART RATE: 100 BPM

## 2023-09-26 PROCEDURE — 99213 OFFICE O/P EST LOW 20 MIN: CPT

## 2023-10-20 ENCOUNTER — APPOINTMENT (OUTPATIENT)
Dept: INTERNAL MEDICINE | Facility: CLINIC | Age: 73
End: 2023-10-20
Payer: MEDICARE

## 2023-10-20 VITALS
HEIGHT: 61 IN | HEART RATE: 72 BPM | OXYGEN SATURATION: 98 % | DIASTOLIC BLOOD PRESSURE: 70 MMHG | SYSTOLIC BLOOD PRESSURE: 138 MMHG | TEMPERATURE: 99.3 F | WEIGHT: 125 LBS | BODY MASS INDEX: 23.6 KG/M2

## 2023-10-20 PROCEDURE — 99213 OFFICE O/P EST LOW 20 MIN: CPT

## 2023-10-20 RX ORDER — BENZONATATE 200 MG/1
200 CAPSULE ORAL 3 TIMES DAILY
Qty: 21 | Refills: 0 | Status: DISCONTINUED | COMMUNITY
Start: 2023-09-26 | End: 2023-10-20

## 2023-11-09 ENCOUNTER — APPOINTMENT (OUTPATIENT)
Dept: INTERNAL MEDICINE | Facility: CLINIC | Age: 73
End: 2023-11-09
Payer: MEDICARE

## 2023-11-09 ENCOUNTER — RESULT REVIEW (OUTPATIENT)
Age: 73
End: 2023-11-09

## 2023-11-09 VITALS
WEIGHT: 127 LBS | BODY MASS INDEX: 23.98 KG/M2 | SYSTOLIC BLOOD PRESSURE: 128 MMHG | TEMPERATURE: 98.7 F | OXYGEN SATURATION: 97 % | HEIGHT: 61 IN | DIASTOLIC BLOOD PRESSURE: 76 MMHG | HEART RATE: 80 BPM

## 2023-11-09 DIAGNOSIS — Z87.898 PERSONAL HISTORY OF OTHER SPECIFIED CONDITIONS: ICD-10-CM

## 2023-11-09 PROCEDURE — 36415 COLL VENOUS BLD VENIPUNCTURE: CPT

## 2023-11-09 PROCEDURE — 99214 OFFICE O/P EST MOD 30 MIN: CPT | Mod: 25

## 2023-11-09 RX ORDER — ALBUTEROL SULFATE 90 UG/1
108 (90 BASE) INHALANT RESPIRATORY (INHALATION)
Qty: 1 | Refills: 1 | Status: ACTIVE | COMMUNITY
Start: 2023-05-23 | End: 1900-01-01

## 2023-11-10 LAB
ALBUMIN SERPL ELPH-MCNC: 4.5 G/DL
ALP BLD-CCNC: 98 U/L
ALT SERPL-CCNC: 14 U/L
ANION GAP SERPL CALC-SCNC: 12 MMOL/L
AST SERPL-CCNC: 21 U/L
BASOPHILS # BLD AUTO: 0.05 K/UL
BASOPHILS NFR BLD AUTO: 0.6 %
BILIRUB SERPL-MCNC: 0.4 MG/DL
BUN SERPL-MCNC: 16 MG/DL
CALCIUM SERPL-MCNC: 9.3 MG/DL
CHLORIDE SERPL-SCNC: 103 MMOL/L
CO2 SERPL-SCNC: 26 MMOL/L
CREAT SERPL-MCNC: 0.84 MG/DL
EGFR: 92 ML/MIN/1.73M2
EOSINOPHIL # BLD AUTO: 0.22 K/UL
EOSINOPHIL NFR BLD AUTO: 2.7 %
GLUCOSE SERPL-MCNC: 93 MG/DL
HCT VFR BLD CALC: 40.7 %
HGB BLD-MCNC: 13 G/DL
IMM GRANULOCYTES NFR BLD AUTO: 0.2 %
LYMPHOCYTES # BLD AUTO: 2.71 K/UL
LYMPHOCYTES NFR BLD AUTO: 33.7 %
MAN DIFF?: NORMAL
MCHC RBC-ENTMCNC: 31.9 GM/DL
MCHC RBC-ENTMCNC: 33.5 PG
MCV RBC AUTO: 104.9 FL
MONOCYTES # BLD AUTO: 0.7 K/UL
MONOCYTES NFR BLD AUTO: 8.7 %
NEUTROPHILS # BLD AUTO: 4.34 K/UL
NEUTROPHILS NFR BLD AUTO: 54.1 %
PLATELET # BLD AUTO: 201 K/UL
POTASSIUM SERPL-SCNC: 4.5 MMOL/L
PROT SERPL-MCNC: 6.8 G/DL
RBC # BLD: 3.88 M/UL
RBC # FLD: 14.6 %
SODIUM SERPL-SCNC: 142 MMOL/L
WBC # FLD AUTO: 8.04 K/UL

## 2023-12-22 ENCOUNTER — APPOINTMENT (OUTPATIENT)
Dept: PAIN MANAGEMENT | Facility: CLINIC | Age: 73
End: 2023-12-22
Payer: MEDICARE

## 2023-12-22 VITALS
WEIGHT: 127 LBS | HEIGHT: 61 IN | DIASTOLIC BLOOD PRESSURE: 71 MMHG | HEART RATE: 81 BPM | SYSTOLIC BLOOD PRESSURE: 117 MMHG | BODY MASS INDEX: 23.98 KG/M2

## 2023-12-22 PROCEDURE — 99214 OFFICE O/P EST MOD 30 MIN: CPT

## 2023-12-22 NOTE — ASSESSMENT
[FreeTextEntry1] : >> Imaging and Other Studies  I personally reviewed the relevant imaging. Discussed and explained to patient the likely source of pathology and pain. Questions answered. MRI  >> Therapy and Other Modalities  start PT - referral provided  >> Medications  gabapentin 100mg uptitrate to TID  cautioned change in mood. Encouraged to call with any worsening mood or depression/suicidal ideations  acetaminophen 650mg q8h prn pain (caution <3g daily)  >> Interventions  given efficacy of previous intervention that is >80% improvement in pain and improved ability to perform adls, and return of pain despite conservative treatment, sp repeat L5-S1 interlaminar epidural steroid injection with significant improvement  given efficacy of previous intervention that is >80% improvement in pain and improved ability to perform adls, and return of pain despite conservative treatment, will schedule repeat L5-S1 interlaminar epidural steroid injection r/b/a discussed   >> Consults  na  >> Discussion of Risks/Benefits/Alternatives   >Regarding any scheduled procedures:  I have discussed in detail with the patient that any interventional pain procedure is associated with potential risks. The procedure may include an injection of steroids and potentially other medications (local anesthetic and normal saline) into the epidural space or surrounding tissue of the spine. There are significant risks of this procedure which include and are not limited to infection, bleeding, worsening pain, dural puncture leading to postdural puncture headache, nerve damage, spinal cord injury, paralysis, stroke, and death.  There is a chance that the procedure does not improve their pain.  There are risks associated with the steroid being absorbed into the body systemically. These include dysphoria, difficulty sleeping, mood swings and personality changes. Premenopausal women may notice an irregularity in her menstrual cycle for 2-3 months following the injection. Steroids can specifically affect patients with hypertension, diabetes, and peptic ulcers. The procedure may cause a temporary increase in blood pressure and blood pressure, and may adversely affect a peptic ulcer. Other, more rare complications, include avascular necrosis of joints, glaucoma and worsening of osteoporosis.  I have discussed the risks of the procedure at length with the patient, and the potential benefits of pain relief. I have offered alternatives to the procedure. All questions were answered.  The patient expressed understanding and wishes to proceed with the procedure.   >Regarding COVID19 Pandemic:  Any planned interventional pain procedure are scheduled because further delay may cause harm or negative outcome to patient. The goal in performing this procedure is to avoid deterioration of function, emergency room visits (which increases exposure) and reliance on opioids.  r/b/a discussed with patient, lack of evidence to conclusively determine whether pain management procedures have any positive or negative impact on the possibility of mir the virus and/or development of any sequelae.  Patient counselled regarding timing steroid based intervention 2 weeks before or after COVID-19 vaccine administration to avoid any interaction or affect on efficacy of vaccination  Patient demonstrates understanding  Informed patient that risks associated with the COVID-19 infection. Informed patient steps taken to limit the risks. We are implementing safety precautions and following protocols consistent with the CDC and state recommendations. All patients and staff will be checked for fever or signs of illness upon entry to the facility. We will limit our steroid dose to the lowest effective therapeutic dose or in some cases steroids will not be injected at all.  Patient agrees to proceed  >> Conclusion  The above diagnosis and treatment plan is medically reasonable and necessary based on the patient encounter There were no barriers to communication. Informed patient that I would be available for any additional questions. Patient was instructed to call with any worsening symptoms including severe pain, new numbness/weakness, or changes in the bowel/bladder function. Discussed role of nsaids in pain management and all relevant risks, if patient is continuing to require after 4 weeks the patient should f/u for alternative treatment. Instructed patient to maintain pain diary to monitor pain level, mobility, and function.

## 2023-12-22 NOTE — HISTORY OF PRESENT ILLNESS
[Back Pain] : back pain [___ mths] : [unfilled] month(s) ago [Constant] : constant [Dull] : dull [Burning] : burning [Shooting] : shooting [Standing] : standing [Transitioning] : transitioning [Bending] : bending [Medications] : medications [Other: ___] : [unfilled] [8] : 3. What number best describes how, during the past week, pain has interfered with your general activity? 8/10 pain [FreeTextEntry1] : Interval Note:  Returns of back and posterolateral leg pain.  Pain is so bad that patient finds it difficult to perform adls and ambulate.  Denies any additional weakness, numbness, bowel/bladder dysfunction.    HPI     Mr. LATISHA WEBSTER is a 73 year M with pmhx of prostate cancer (s/p prostatectomy 2017- mild urinary incontinence since), BCC (s/p rsxn Dr Joya), psoriasis (methotrexate), HTN, hypothyroid. Complains of bilateral lower extremity numbness/tingling and pain to posterior leg when walking. Began 1 year ago. Had L4-5 MARIE Dec `22 with Dr Ortega with relief x 1 month. Was partaking in PT 3x per week pre and post injection, unsure if helpful. Tylenol, Ibuprofen with no relief. Tried Gabapentin with adverse effects. Denies any additional weakness, numbness, bowel/bladder dysfunction.     Previous and current pain medications/doses/effects: Tylenol, Ibuprofen     Previous Pain Treatments: Physical therapy     Previous Pain Injections: L5-S1 interlaminar epidural steroid injection 7/27/23   L4-5 MARIE Dec `22-  Dr Ortega      Previous Diagnostic Studies/Images: 11/21/22 Exam: MRI LUMBAR SPINE  Order#: [id number]     EXAM: MRI lumbar spine without contrast   INDICATION: Lumbar back pain   TECHNIQUE: MR exam of lumbar spine without contrast utilizing coronal T2, sagittal T2 STIR,  sagittal T2, sagittal T1, axial T1, axial T2 sequences   COMPARISON: October 9, 2014 MRI lumbar spine    FINDINGS:     There appear to be 5 nonrib-bearing lumbar type vertebral bodies. For purposes of this report,  vertebral body at level of the iliolumbar ligament will be designated as L5. Inferiormost well- formed intervertebral disc space will be designated as L5-S1. No MR evidence for transitional  lumbosacral anatomy.     Lumbar lordosis is maintained. No significant spondylolisthesis. Vertebral body heights are  maintained. There are multilevel degenerative endplate changes with osteophyte formation, inter vertebral disc space narrowing/desiccation, Schmorl's nodes and endplate irregularity. Vertebral  body bone marrow signal within normal limits. No abnormal cord signal identified. Conus terminates  at L1-L2. No significant periarticular or paraspinal soft tissue edema is identified. No suspicious  expansile or destructive osseous lesion identified. Paraspinal soft tissues are unremarkable.    There are multilevel findings as follows with comparison made to 2014 exam:      T12-L1: No significant canal or foraminal stenosis. Unchanged    L1-L2: No significant canal or foraminal stenosis. Unchanged    L2-L3: Disc bulge, bilateral facet arthropathy, ligamentous hypertrophy contributing to mild canal  stenosis and mild bilateral foraminal stenosis. Unchanged    L3-L4: Disc bulge, bilateral facet arthropathy, ligamentous hypertrophy contributing to moderate to severe canal stenosis and moderate bilateral foraminal stenosis.    L4-L5: Disc bulge, bilateral facet arthropathy, ligamentous hypertrophy contributing to severe  canal stenosis and moderate to severe bilateral foraminal stenosis. Severe bilateral lateral recess  stenosis. Findings are progressed.   L5-S1: Disc bulge, bilateral facet arthropathy, ligamentous hypertrophy contributing to no  significant canal stenosis and mild bilateral foraminal stenosis. Unchanged       IMPRESSION:    Multilevel lumbar spondylitic changes as detailed above most notable at the L3-L4, L4-L5 levels  where there is moderate severe canal stenosis, moderate to severe bilateral foraminal stenosis,  involving exiting L3, L4 nerve roots, respectively and moderate to severe bilateral lateral recess  stenosis, involving descending L4 and L5 nerve roots, respectively. Findings at these levels have  progressed when compared to the 2014 exam. Additional levels are not significantly changed..     If clinicians have any questions/need for clarification regarding this report, Dr. Kaleb Metcalf can be best reached via the patient secure hospital email system      [FreeTextEntry7] : Lower back , radiates down right leg [de-identified] : tri and hermelindo [FreeTextEntry4] : tylenol [FreeTextEntry2] : 24

## 2024-01-09 ENCOUNTER — APPOINTMENT (OUTPATIENT)
Dept: PAIN MANAGEMENT | Facility: CLINIC | Age: 74
End: 2024-01-09
Payer: MEDICARE

## 2024-01-09 VITALS
DIASTOLIC BLOOD PRESSURE: 72 MMHG | OXYGEN SATURATION: 96 % | SYSTOLIC BLOOD PRESSURE: 159 MMHG | RESPIRATION RATE: 16 BRPM | HEART RATE: 75 BPM

## 2024-01-09 PROCEDURE — 62323 NJX INTERLAMINAR LMBR/SAC: CPT

## 2024-01-09 RX ADMIN — TRIAMCINOLONE ACETONIDE 0 MG/ML: 80 INJECTION, SUSPENSION INTRA-ARTICULAR; INTRAMUSCULAR at 00:00

## 2024-01-09 RX ADMIN — IOHEXOL 0 MG/ML: 180 INJECTION INTRAVENOUS at 00:00

## 2024-01-09 RX ADMIN — LIDOCAINE HYDROCHLORIDE %: 10 INJECTION, SOLUTION INFILTRATION; PERINEURAL at 00:00

## 2024-01-16 ENCOUNTER — APPOINTMENT (OUTPATIENT)
Dept: CARDIOLOGY | Facility: CLINIC | Age: 74
End: 2024-01-16
Payer: MEDICARE

## 2024-01-16 DIAGNOSIS — I34.0 NONRHEUMATIC MITRAL (VALVE) INSUFFICIENCY: ICD-10-CM

## 2024-01-16 PROCEDURE — 93306 TTE W/DOPPLER COMPLETE: CPT

## 2024-01-17 ENCOUNTER — APPOINTMENT (OUTPATIENT)
Dept: INTERNAL MEDICINE | Facility: CLINIC | Age: 74
End: 2024-01-17
Payer: MEDICARE

## 2024-01-17 VITALS
OXYGEN SATURATION: 95 % | DIASTOLIC BLOOD PRESSURE: 70 MMHG | BODY MASS INDEX: 24.35 KG/M2 | SYSTOLIC BLOOD PRESSURE: 120 MMHG | HEIGHT: 60 IN | HEART RATE: 78 BPM | WEIGHT: 124 LBS

## 2024-01-17 DIAGNOSIS — R05.3 CHRONIC COUGH: ICD-10-CM

## 2024-01-17 DIAGNOSIS — Z85.46 PERSONAL HISTORY OF MALIGNANT NEOPLASM OF PROSTATE: ICD-10-CM

## 2024-01-17 DIAGNOSIS — R63.4 ABNORMAL WEIGHT LOSS: ICD-10-CM

## 2024-01-17 DIAGNOSIS — Z23 ENCOUNTER FOR IMMUNIZATION: ICD-10-CM

## 2024-01-17 DIAGNOSIS — I10 ESSENTIAL (PRIMARY) HYPERTENSION: ICD-10-CM

## 2024-01-17 DIAGNOSIS — D64.9 ANEMIA, UNSPECIFIED: ICD-10-CM

## 2024-01-17 DIAGNOSIS — R73.09 OTHER ABNORMAL GLUCOSE: ICD-10-CM

## 2024-01-17 DIAGNOSIS — D22.9 MELANOCYTIC NEVI, UNSPECIFIED: ICD-10-CM

## 2024-01-17 DIAGNOSIS — E03.9 HYPOTHYROIDISM, UNSPECIFIED: ICD-10-CM

## 2024-01-17 PROCEDURE — 36415 COLL VENOUS BLD VENIPUNCTURE: CPT

## 2024-01-17 PROCEDURE — 99213 OFFICE O/P EST LOW 20 MIN: CPT | Mod: 25

## 2024-01-17 PROCEDURE — G0008: CPT

## 2024-01-17 PROCEDURE — 90662 IIV NO PRSV INCREASED AG IM: CPT

## 2024-01-17 PROCEDURE — G0439: CPT

## 2024-01-17 NOTE — COUNSELING
[Fall prevention counseling provided] : Fall prevention counseling provided [FreeTextEntry2] : Pt counseled on proper diet and exercise. We discussed the importance of exercise in maintaining a healthy lifestyle.

## 2024-01-17 NOTE — HEALTH RISK ASSESSMENT
[Good] : ~his/her~  mood as  good [No] : In the past 12 months have you used drugs other than those required for medical reasons? No [0] : 2) Feeling down, depressed, or hopeless: Not at all (0) [PHQ-2 Negative - No further assessment needed] : PHQ-2 Negative - No further assessment needed [None] : None [With Significant Other] : lives with significant other [Retired] : retired [] :  [# Of Children ___] : has [unfilled] children [Feels Safe at Home] : Feels safe at home [Independent] : managing finances [Never] : Never [Patient reported colonoscopy was normal] : Patient reported colonoscopy was normal [HIV test declined] : HIV test declined [Hepatitis C test declined] : Hepatitis C test declined [With Family] : lives with family [Fully functional (bathing, dressing, toileting, transferring, walking, feeding)] : Fully functional (bathing, dressing, toileting, transferring, walking, feeding) [Fully functional (using the telephone, shopping, preparing meals, housekeeping, doing laundry, using] : Fully functional and needs no help or supervision to perform IADLs (using the telephone, shopping, preparing meals, housekeeping, doing laundry, using transportation, managing medications and managing finances) [Audit-CScore] : 0 [[id number]] : 0 [Change in mental status noted] : No change in mental status noted [Reports changes in hearing] : Reports no changes in hearing [Reports changes in vision] : Reports no changes in vision [Reports changes in dental health] : Reports no changes in dental health [ColonoscopyDate] : 11/21 [ColonoscopyComments] : Dr Bauer, needs in 2026 [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

## 2024-01-17 NOTE — HISTORY OF PRESENT ILLNESS
[PMH Reviewed and Updated] : past medical history reviewed and updated [PSH Reviewed and Updated] : past surgical history reviewed and updated [Family History Reviewed and Updated] : family history reviewed and updated [Medication and Allergies Reconciled] : medication and allergies reconciled [0] : 0 [Retired] : retired from work [None] : The patient has no concerns about alcohol abuse [Never] : has never used illicit drugs [Walking] : walking [Compliant with medications] : compliant with medications [Spouse] : spouse [Children] : children [Extended Family] : extended family [Fully Independent] : fully independent [Drives without concerns] : drives without concerns [No history of falls] : no history of falls [Seatbelts] : seatbelts [Bicycle Helmet] : bicycle helmet [Safe Driving Habits] : safe driving habits [Smoke Detectors] : smoke detectors [Carbon Monoxide Detector] : carbon monoxide detector [Hot Water Temp <120F] : hot water temp <120F [Patient Has Full Capacity] : this patient has full decision making capacity for discussion of advance care planning [Patient Has Designated Health Care Proxy/Advanced Directive] : patient has designated Health Care Proxy/Advanced Directive [Over the Past 2 Weeks, Have You Felt Down, Depressed, or Hopeless?] : 1.) Over the past 2 weeks, have you felt down, depressed, or hopeless? No [Over the Past 2 Weeks, Have You Felt Little Interest or Pleasure Doing Things?] : 2.) Over the past 2 weeks, have you felt little interest or pleasure doing things? No [Motorcycle Helmet] : not using motorcycle helmet [Bathroom Grab Bars] : not using bathroom grab bars [Gun Trigger Locks] : not using gun trigger locks [Gun Safe] : not using a gun safe [CPR Training for Household] : did not receive CPR training for patient [Sunscreen] : not using sunscreen [CPR Training for Patient] : did not receive CPR training for patient [FreeTextEntry2] : none [de-identified] : none [FreeTextEntry1] : medicare annual wellness.  [de-identified] : Pt here for annual wellness. He has had 3 injections for sciatica. the thrid has helped a little. He also went to PT and he is doing those exercises at home. He c/o foul smell to the urine. He takes B12 and D. He is unsure if he is well hydrated.  No cardiac complaints. He walks for exercise.

## 2024-01-17 NOTE — PHYSICAL EXAM
[Normal Sclera/Conjunctiva] : normal sclera/conjunctiva [Normal Outer Ear/Nose] : the outer ears and nose were normal in appearance [Normal TMs] : both tympanic membranes were normal [No JVD] : no jugular venous distention [No Lymphadenopathy] : no lymphadenopathy [Supple] : supple [No Carotid Bruits] : no carotid bruits [Pedal Pulses Present] : the pedal pulses are present [No Edema] : there was no peripheral edema [No Rash] : no rash [Normal] : affect was normal and insight and judgment were intact [Normal Posterior Cervical Nodes] : no posterior cervical lymphadenopathy [Normal Affect] : the affect was normal [Normal Insight/Judgement] : insight and judgment were intact [de-identified] : numerous moles and skin lesions

## 2024-01-18 DIAGNOSIS — L30.9 DERMATITIS, UNSPECIFIED: ICD-10-CM

## 2024-01-18 LAB
ALBUMIN SERPL ELPH-MCNC: 4.4 G/DL
ALP BLD-CCNC: 88 U/L
ALT SERPL-CCNC: 20 U/L
ANION GAP SERPL CALC-SCNC: 13 MMOL/L
AST SERPL-CCNC: 22 U/L
BILIRUB SERPL-MCNC: 0.5 MG/DL
BUN SERPL-MCNC: 25 MG/DL
CALCIUM SERPL-MCNC: 9.3 MG/DL
CHLORIDE SERPL-SCNC: 103 MMOL/L
CHOLEST SERPL-MCNC: 151 MG/DL
CO2 SERPL-SCNC: 24 MMOL/L
CREAT SERPL-MCNC: 0.91 MG/DL
EGFR: 88 ML/MIN/1.73M2
ESTIMATED AVERAGE GLUCOSE: 128 MG/DL
GLUCOSE SERPL-MCNC: 106 MG/DL
HBA1C MFR BLD HPLC: 6.1 %
HCT VFR BLD CALC: 44.4 %
HDLC SERPL-MCNC: 53 MG/DL
HGB BLD-MCNC: 14.4 G/DL
LDLC SERPL CALC-MCNC: 83 MG/DL
MCHC RBC-ENTMCNC: 32.4 GM/DL
MCHC RBC-ENTMCNC: 34 PG
MCV RBC AUTO: 104.7 FL
NONHDLC SERPL-MCNC: 98 MG/DL
PLATELET # BLD AUTO: 226 K/UL
POTASSIUM SERPL-SCNC: 4.1 MMOL/L
PROT SERPL-MCNC: 6.4 G/DL
PSA SERPL-MCNC: <0.01 NG/ML
RBC # BLD: 4.24 M/UL
RBC # FLD: 14.1 %
SODIUM SERPL-SCNC: 140 MMOL/L
TRIGL SERPL-MCNC: 77 MG/DL
TSH SERPL-ACNC: 2.59 UIU/ML
WBC # FLD AUTO: 9.16 K/UL

## 2024-01-22 ENCOUNTER — NON-APPOINTMENT (OUTPATIENT)
Age: 74
End: 2024-01-22

## 2024-01-25 ENCOUNTER — APPOINTMENT (OUTPATIENT)
Dept: RHEUMATOLOGY | Facility: CLINIC | Age: 74
End: 2024-01-25
Payer: MEDICARE

## 2024-01-25 ENCOUNTER — RESULT REVIEW (OUTPATIENT)
Age: 74
End: 2024-01-25

## 2024-01-25 VITALS
SYSTOLIC BLOOD PRESSURE: 118 MMHG | DIASTOLIC BLOOD PRESSURE: 64 MMHG | WEIGHT: 124 LBS | OXYGEN SATURATION: 98 % | HEIGHT: 60 IN | HEART RATE: 71 BPM | BODY MASS INDEX: 24.35 KG/M2

## 2024-01-25 PROCEDURE — 99215 OFFICE O/P EST HI 40 MIN: CPT

## 2024-01-30 ENCOUNTER — APPOINTMENT (OUTPATIENT)
Dept: PAIN MANAGEMENT | Facility: CLINIC | Age: 74
End: 2024-01-30
Payer: MEDICARE

## 2024-01-30 VITALS
HEIGHT: 60 IN | BODY MASS INDEX: 24.35 KG/M2 | SYSTOLIC BLOOD PRESSURE: 136 MMHG | DIASTOLIC BLOOD PRESSURE: 84 MMHG | WEIGHT: 124 LBS

## 2024-01-30 PROCEDURE — 99214 OFFICE O/P EST MOD 30 MIN: CPT

## 2024-01-30 PROCEDURE — G2211 COMPLEX E/M VISIT ADD ON: CPT

## 2024-01-30 NOTE — HISTORY OF PRESENT ILLNESS
[Back Pain] : back pain [___ mths] : [unfilled] month(s) ago [Constant] : constant [Dull] : dull [Burning] : burning [Shooting] : shooting [Standing] : standing [Transitioning] : transitioning [Bending] : bending [Medications] : medications [Other: ___] : [unfilled] [8] : 3. What number best describes how, during the past week, pain has interfered with your general activity? 8/10 pain [FreeTextEntry1] : Interval Note: sp  L5-S1 interlaminar epidural steroid injection 1/9/24 with significant improvement in leg pain.  Some tingling.  Complains of axial back pain.  Pain is so bad that patient finds it difficult to perform adls and ambulate.  Denies any additional weakness, numbness, bowel/bladder dysfunction.    HPI     Mr. LATISHA WEBSTER is a 73 year M with pmhx of prostate cancer (s/p prostatectomy 2017- mild urinary incontinence since), BCC (s/p rsxn Dr Joya), psoriasis (methotrexate), HTN, hypothyroid. Complains of bilateral lower extremity numbness/tingling and pain to posterior leg when walking. Began 1 year ago. Had L4-5 MARIE Dec `22 with Dr Ortega with relief x 1 month. Was partaking in PT 3x per week pre and post injection, unsure if helpful. Tylenol, Ibuprofen with no relief. Tried Gabapentin with adverse effects. Denies any additional weakness, numbness, bowel/bladder dysfunction.     Previous and current pain medications/doses/effects: Tylenol, Ibuprofen     Previous Pain Treatments: Physical therapy     Previous Pain Injections:   L5-S1 interlaminar epidural steroid injection 1/9/24 L5-S1 interlaminar epidural steroid injection 7/27/23   L4-5 MARIE Dec `22-  Dr Ortega      Previous Diagnostic Studies/Images: 11/21/22 Exam: MRI LUMBAR SPINE  Order#: [id number]     EXAM: MRI lumbar spine without contrast   INDICATION: Lumbar back pain   TECHNIQUE: MR exam of lumbar spine without contrast utilizing coronal T2, sagittal T2 STIR,  sagittal T2, sagittal T1, axial T1, axial T2 sequences   COMPARISON: October 9, 2014 MRI lumbar spine    FINDINGS:     There appear to be 5 nonrib-bearing lumbar type vertebral bodies. For purposes of this report,  vertebral body at level of the iliolumbar ligament will be designated as L5. Inferiormost well- formed intervertebral disc space will be designated as L5-S1. No MR evidence for transitional  lumbosacral anatomy.     Lumbar lordosis is maintained. No significant spondylolisthesis. Vertebral body heights are  maintained. There are multilevel degenerative endplate changes with osteophyte formation, inter vertebral disc space narrowing/desiccation, Schmorl's nodes and endplate irregularity. Vertebral  body bone marrow signal within normal limits. No abnormal cord signal identified. Conus terminates  at L1-L2. No significant periarticular or paraspinal soft tissue edema is identified. No suspicious  expansile or destructive osseous lesion identified. Paraspinal soft tissues are unremarkable.    There are multilevel findings as follows with comparison made to 2014 exam:      T12-L1: No significant canal or foraminal stenosis. Unchanged    L1-L2: No significant canal or foraminal stenosis. Unchanged    L2-L3: Disc bulge, bilateral facet arthropathy, ligamentous hypertrophy contributing to mild canal  stenosis and mild bilateral foraminal stenosis. Unchanged    L3-L4: Disc bulge, bilateral facet arthropathy, ligamentous hypertrophy contributing to moderate to severe canal stenosis and moderate bilateral foraminal stenosis.    L4-L5: Disc bulge, bilateral facet arthropathy, ligamentous hypertrophy contributing to severe  canal stenosis and moderate to severe bilateral foraminal stenosis. Severe bilateral lateral recess  stenosis. Findings are progressed.   L5-S1: Disc bulge, bilateral facet arthropathy, ligamentous hypertrophy contributing to no  significant canal stenosis and mild bilateral foraminal stenosis. Unchanged       IMPRESSION:    Multilevel lumbar spondylitic changes as detailed above most notable at the L3-L4, L4-L5 levels  where there is moderate severe canal stenosis, moderate to severe bilateral foraminal stenosis,  involving exiting L3, L4 nerve roots, respectively and moderate to severe bilateral lateral recess  stenosis, involving descending L4 and L5 nerve roots, respectively. Findings at these levels have  progressed when compared to the 2014 exam. Additional levels are not significantly changed..     If clinicians have any questions/need for clarification regarding this report, Dr. Kaleb Metcalf can be best reached via the patient [facility] system      [FreeTextEntry7] : Lower back , radiates down right leg [de-identified] : tri and hermelindo [FreeTextEntry4] : tylenol [FreeTextEntry2] : 24

## 2024-01-30 NOTE — ASSESSMENT
[FreeTextEntry1] : >> Imaging and Other Studies  I personally reviewed the relevant imaging. Discussed and explained to patient the likely source of pathology and pain. Questions answered. MRI  >> Therapy and Other Modalities  start PT - referral provided  >> Medications  trial gabapentin uptitrate to TID cautioned change in mood.  Encouraged to call with any worsening mood or depression/suicidal ideations   acetaminophen 650mg q8h prn pain (caution <3g daily)  >> Interventions  given efficacy of previous intervention that is >80% improvement in pain and improved ability to perform adls, and return of pain despite conservative treatment, sp repeat L5-S1 interlaminar epidural steroid injection with significant improvement x 2   Significant component of axial back pain secondary to lumbar spondylosis and facet arthropathy demonstrated on MRI LS.  Pain refractory to conservative treatments.  Will schedule BILATERAL L4-sacral ala diagnostic medial branch block (2 joints, 3 nerves on each side) r/b/a discussed  May consider radiofreqency ablation  >> Consults  na  >> Discussion of Risks/Benefits/Alternatives   >Regarding any scheduled procedures:  I have discussed in detail with the patient that any interventional pain procedure is associated with potential risks. The procedure may include an injection of steroids and potentially other medications (local anesthetic and normal saline) into the epidural space or surrounding tissue of the spine. There are significant risks of this procedure which include and are not limited to infection, bleeding, worsening pain, dural puncture leading to postdural puncture headache, nerve damage, spinal cord injury, paralysis, stroke, and death.  There is a chance that the procedure does not improve their pain.  There are risks associated with the steroid being absorbed into the body systemically. These include dysphoria, difficulty sleeping, mood swings and personality changes. Premenopausal women may notice an irregularity in her menstrual cycle for 2-3 months following the injection. Steroids can specifically affect patients with hypertension, diabetes, and peptic ulcers. The procedure may cause a temporary increase in blood pressure and blood pressure, and may adversely affect a peptic ulcer. Other, more rare complications, include avascular necrosis of joints, glaucoma and worsening of osteoporosis.  I have discussed the risks of the procedure at length with the patient, and the potential benefits of pain relief. I have offered alternatives to the procedure. All questions were answered.  The patient expressed understanding and wishes to proceed with the procedure.    > Longitudinal management of Complex Painful condition   The patient is being managed for a complex condition that requires ongoing management.  The nature of this condition demands nuanced approach to treatment.  The seriousness of the condition necessitates an in-depth and focused approach to management and coordination with other healthcare professionals.    This visit involves intricate evaluation and management of the patient's condition.  The complexity of the visit was due to the need for detailed assessment of the current state, consideration of potential complications and a careful balancing of treatment options to management the chronic condition effectively.   As detailed above, the patient has a chronic significant painful condition that requires regular and detailed management.  The condition's impact on the patient's quality of life and health is substantial and necessitates a comprehensive and tailored approach   >> Conclusion   There were no barriers to communication. Informed patient that I would be available for any additional questions. Patient was instructed to call with any worsening symptoms including severe pain, new numbness/weakness, or changes in the bowel/bladder function. Discussed role of nsaids in pain management and all relevant risks, if patient is continuing to require after 4 weeks the patient should f/u for alternative treatment. Instructed patient to maintain pain diary to monitor pain level, mobility, and function.

## 2024-02-05 ENCOUNTER — APPOINTMENT (OUTPATIENT)
Dept: RHEUMATOLOGY | Facility: CLINIC | Age: 74
End: 2024-02-05

## 2024-02-27 ENCOUNTER — APPOINTMENT (OUTPATIENT)
Dept: PAIN MANAGEMENT | Facility: CLINIC | Age: 74
End: 2024-02-27
Payer: MEDICARE

## 2024-02-27 VITALS
SYSTOLIC BLOOD PRESSURE: 151 MMHG | HEART RATE: 67 BPM | DIASTOLIC BLOOD PRESSURE: 67 MMHG | OXYGEN SATURATION: 98 % | RESPIRATION RATE: 15 BRPM

## 2024-02-27 PROCEDURE — 64494 INJ PARAVERT F JNT L/S 2 LEV: CPT | Mod: 50

## 2024-02-27 PROCEDURE — 64493 INJ PARAVERT F JNT L/S 1 LEV: CPT | Mod: 50

## 2024-02-27 RX ADMIN — TRIAMCINOLONE ACETONIDE 0 MG/ML: 10 INJECTION, SUSPENSION INTRA-ARTICULAR; INTRALESIONAL at 00:00

## 2024-02-27 RX ADMIN — LIDOCAINE HYDROCHLORIDE %: 10 INJECTION, SOLUTION INFILTRATION; PERINEURAL at 00:00

## 2024-02-27 RX ADMIN — BUPIVACAINE HYDROCHLORIDE 0 %: 7.5 INJECTION, SOLUTION EPIDURAL; RETROBULBAR at 00:00

## 2024-02-28 ENCOUNTER — APPOINTMENT (OUTPATIENT)
Dept: PAIN MANAGEMENT | Facility: CLINIC | Age: 74
End: 2024-02-28
Payer: MEDICARE

## 2024-02-28 VITALS
HEIGHT: 60 IN | DIASTOLIC BLOOD PRESSURE: 71 MMHG | SYSTOLIC BLOOD PRESSURE: 121 MMHG | WEIGHT: 124 LBS | BODY MASS INDEX: 24.35 KG/M2

## 2024-02-28 PROCEDURE — 99212 OFFICE O/P EST SF 10 MIN: CPT

## 2024-02-28 NOTE — ASSESSMENT
[FreeTextEntry1] : >> Imaging and Other Studies  I personally reviewed the relevant imaging. Discussed and explained to patient the likely source of pathology and pain. Questions answered. MRI  >> Therapy and Other Modalities  start PT - referral provided  >> Medications  gabapentin uptitrate to TID cautioned change in mood.  Encouraged to call with any worsening mood or depression/suicidal ideations   acetaminophen 650mg q8h prn pain (caution <3g daily)  >> Interventions  given efficacy of previous intervention that is >80% improvement in pain and improved ability to perform adls, and return of pain despite conservative treatment, sp repeat L5-S1 interlaminar epidural steroid injection with significant improvement x 2   Significant component of axial back pain secondary to lumbar spondylosis and facet arthropathy demonstrated on MRI LS.  Pain refractory to conservative treatments.  sp BILATERAL L4-sacral ala diagnostic medial branch block (2 joints, 3 nerves on each side) with 90% improvement  given efficacy of previous intervention that is >80% improvement in pain and improved ability to perform adls, and return of pain despite conservative treatment, will schedule repeat BILATERAL L4-sacral ala diagnostic medial branch block (2 joints, 3 nerves on each side)  r/b/a discussed  >> Consults  na  >> Discussion of Risks/Benefits/Alternatives   >Regarding any scheduled procedures:  I have discussed in detail with the patient that any interventional pain procedure is associated with potential risks. The procedure may include an injection of steroids and potentially other medications (local anesthetic and normal saline) into the epidural space or surrounding tissue of the spine. There are significant risks of this procedure which include and are not limited to infection, bleeding, worsening pain, dural puncture leading to postdural puncture headache, nerve damage, spinal cord injury, paralysis, stroke, and death.  There is a chance that the procedure does not improve their pain.  There are risks associated with the steroid being absorbed into the body systemically. These include dysphoria, difficulty sleeping, mood swings and personality changes. Premenopausal women may notice an irregularity in her menstrual cycle for 2-3 months following the injection. Steroids can specifically affect patients with hypertension, diabetes, and peptic ulcers. The procedure may cause a temporary increase in blood pressure and blood pressure, and may adversely affect a peptic ulcer. Other, more rare complications, include avascular necrosis of joints, glaucoma and worsening of osteoporosis.  I have discussed the risks of the procedure at length with the patient, and the potential benefits of pain relief. I have offered alternatives to the procedure. All questions were answered.  The patient expressed understanding and wishes to proceed with the procedure.    > Longitudinal management of Complex Painful condition   The patient is being managed for a complex condition that requires ongoing management.  The nature of this condition demands nuanced approach to treatment.  The seriousness of the condition necessitates an in-depth and focused approach to management and coordination with other healthcare professionals.    This visit involves intricate evaluation and management of the patient's condition.  The complexity of the visit was due to the need for detailed assessment of the current state, consideration of potential complications and a careful balancing of treatment options to management the chronic condition effectively.   As detailed above, the patient has a chronic significant painful condition that requires regular and detailed management.  The condition's impact on the patient's quality of life and health is substantial and necessitates a comprehensive and tailored approach   >> Conclusion   There were no barriers to communication. Informed patient that I would be available for any additional questions. Patient was instructed to call with any worsening symptoms including severe pain, new numbness/weakness, or changes in the bowel/bladder function. Discussed role of nsaids in pain management and all relevant risks, if patient is continuing to require after 4 weeks the patient should f/u for alternative treatment. Instructed patient to maintain pain diary to monitor pain level, mobility, and function.

## 2024-02-28 NOTE — HISTORY OF PRESENT ILLNESS
[Back Pain] : back pain [___ mths] : [unfilled] month(s) ago [Constant] : constant [Burning] : burning [Dull] : dull [Standing] : standing [Shooting] : shooting [Bending] : bending [Transitioning] : transitioning [Medications] : medications [Other: ___] : [unfilled] [8] : 3. What number best describes how, during the past week, pain has interfered with your general activity? 8/10 pain [FreeTextEntry1] : Interval Note:  sp BILATERAL L4-sacral ala diagnostic medial branch block (2 joints, 3 nerves on each side) 2/27/24 with significant 90% improvement of axial back pain.  Patient reports that pain is returning.  Denies any additional weakness, numbness, bowel/bladder dysfunction.    HPI     Mr. LATISHA WEBSTER is a 73 year M with pmhx of prostate cancer (s/p prostatectomy 2017- mild urinary incontinence since), BCC (s/p rsxn Dr Joya), psoriasis (methotrexate), HTN, hypothyroid. Complains of bilateral lower extremity numbness/tingling and pain to posterior leg when walking. Began 1 year ago. Had L4-5 MARIE Dec `22 with Dr Ortega with relief x 1 month. Was partaking in PT 3x per week pre and post injection, unsure if helpful. Tylenol, Ibuprofen with no relief. Tried Gabapentin with adverse effects. Denies any additional weakness, numbness, bowel/bladder dysfunction.     Previous and current pain medications/doses/effects: Tylenol, Ibuprofen     Previous Pain Treatments: Physical therapy     Previous Pain Injections: BILATERAL L4-sacral ala diagnostic medial branch block (2 joints, 3 nerves on each side) 2/27/24  L5-S1 interlaminar epidural steroid injection 1/9/24 L5-S1 interlaminar epidural steroid injection 7/27/23   L4-5 MARIE Dec `22-  Dr Ortega      Previous Diagnostic Studies/Images: 11/21/22 Exam: MRI LUMBAR SPINE  Order#: [id number]     EXAM: MRI lumbar spine without contrast   INDICATION: Lumbar back pain   TECHNIQUE: MR exam of lumbar spine without contrast utilizing coronal T2, sagittal T2 STIR,  sagittal T2, sagittal T1, axial T1, axial T2 sequences   COMPARISON: October 9, 2014 MRI lumbar spine    FINDINGS:     There appear to be 5 nonrib-bearing lumbar type vertebral bodies. For purposes of this report,  vertebral body at level of the iliolumbar ligament will be designated as L5. Inferiormost well- formed intervertebral disc space will be designated as L5-S1. No MR evidence for transitional  lumbosacral anatomy.     Lumbar lordosis is maintained. No significant spondylolisthesis. Vertebral body heights are  maintained. There are multilevel degenerative endplate changes with osteophyte formation, inter vertebral disc space narrowing/desiccation, Schmorl's nodes and endplate irregularity. Vertebral  body bone marrow signal within normal limits. No abnormal cord signal identified. Conus terminates  at L1-L2. No significant periarticular or paraspinal soft tissue edema is identified. No suspicious  expansile or destructive osseous lesion identified. Paraspinal soft tissues are unremarkable.    There are multilevel findings as follows with comparison made to 2014 exam:      T12-L1: No significant canal or foraminal stenosis. Unchanged    L1-L2: No significant canal or foraminal stenosis. Unchanged    L2-L3: Disc bulge, bilateral facet arthropathy, ligamentous hypertrophy contributing to mild canal  stenosis and mild bilateral foraminal stenosis. Unchanged    L3-L4: Disc bulge, bilateral facet arthropathy, ligamentous hypertrophy contributing to moderate to severe canal stenosis and moderate bilateral foraminal stenosis.    L4-L5: Disc bulge, bilateral facet arthropathy, ligamentous hypertrophy contributing to severe  canal stenosis and moderate to severe bilateral foraminal stenosis. Severe bilateral lateral recess  stenosis. Findings are progressed.   L5-S1: Disc bulge, bilateral facet arthropathy, ligamentous hypertrophy contributing to no  significant canal stenosis and mild bilateral foraminal stenosis. Unchanged       IMPRESSION:    Multilevel lumbar spondylitic changes as detailed above most notable at the L3-L4, L4-L5 levels  where there is moderate severe canal stenosis, moderate to severe bilateral foraminal stenosis,  involving exiting L3, L4 nerve roots, respectively and moderate to severe bilateral lateral recess  stenosis, involving descending L4 and L5 nerve roots, respectively. Findings at these levels have  progressed when compared to the 2014 exam. Additional levels are not significantly changed..     If clinicians have any questions/need for clarification regarding this report, Dr. Kaleb Metcalf can be best reached via the patient [facility] [employer] system      [FreeTextEntry7] : Lower back , radiates down right leg [FreeTextEntry4] : tylenol [de-identified] : tri and hermelindo [FreeTextEntry2] : 24

## 2024-03-15 ENCOUNTER — APPOINTMENT (OUTPATIENT)
Dept: PAIN MANAGEMENT | Facility: CLINIC | Age: 74
End: 2024-03-15
Payer: MEDICARE

## 2024-03-15 VITALS
HEART RATE: 66 BPM | RESPIRATION RATE: 15 BRPM | DIASTOLIC BLOOD PRESSURE: 87 MMHG | SYSTOLIC BLOOD PRESSURE: 151 MMHG | OXYGEN SATURATION: 98 %

## 2024-03-15 PROCEDURE — 64494 INJ PARAVERT F JNT L/S 2 LEV: CPT | Mod: 50

## 2024-03-15 PROCEDURE — 64493 INJ PARAVERT F JNT L/S 1 LEV: CPT | Mod: 50

## 2024-03-15 RX ADMIN — TRIAMCINOLONE ACETONIDE 0 MG/ML: 10 INJECTION, SUSPENSION INTRA-ARTICULAR; INTRALESIONAL at 00:00

## 2024-03-15 RX ADMIN — LIDOCAINE HYDROCHLORIDE %: 10 INJECTION, SOLUTION INFILTRATION; PERINEURAL at 00:00

## 2024-03-15 RX ADMIN — BUPIVACAINE HYDROCHLORIDE 0 %: 7.5 INJECTION, SOLUTION EPIDURAL; RETROBULBAR at 00:00

## 2024-03-19 ENCOUNTER — APPOINTMENT (OUTPATIENT)
Dept: PAIN MANAGEMENT | Facility: CLINIC | Age: 74
End: 2024-03-19
Payer: MEDICARE

## 2024-03-19 VITALS
BODY MASS INDEX: 24.35 KG/M2 | HEIGHT: 60 IN | SYSTOLIC BLOOD PRESSURE: 154 MMHG | WEIGHT: 124 LBS | DIASTOLIC BLOOD PRESSURE: 84 MMHG

## 2024-03-19 PROCEDURE — 99212 OFFICE O/P EST SF 10 MIN: CPT

## 2024-03-19 NOTE — ASSESSMENT
[FreeTextEntry1] : >> Imaging and Other Studies  I personally reviewed the relevant imaging. Discussed and explained to patient the likely source of pathology and pain. Questions answered. MRI  >> Therapy and Other Modalities  start PT - referral provided  >> Medications  gabapentin uptitrate to TID cautioned change in mood.  Encouraged to call with any worsening mood or depression/suicidal ideations   acetaminophen 650mg q8h prn pain (caution <3g daily)  >> Interventions  given efficacy of previous intervention that is >80% improvement in pain and improved ability to perform adls, and return of pain despite conservative treatment, sp repeat L5-S1 interlaminar epidural steroid injection with significant improvement x 2   Significant component of axial back pain secondary to lumbar spondylosis and facet arthropathy demonstrated on MRI LS.  Pain refractory to conservative treatments.  sp BILATERAL L4-sacral ala diagnostic medial branch block (2 joints, 3 nerves on each side) with 90% improvement x 2  Given significant relief from diagnostic lumbar medial branch block of >80%, and significant improvement in function and continued lumbar facet arthropathy pain (demonstrated on imaging) and axial back pain, will schedule LEFT then RIGHT  L4-sacral ala medial branch (2 joints, 3 nerves on each side) radiofrequency ablation at 80C for 2:30 min r/b/a discussed  >> Consults  na  >> Discussion of Risks/Benefits/Alternatives   >Regarding any scheduled procedures:  I have discussed in detail with the patient that any interventional pain procedure is associated with potential risks. The procedure may include an injection of steroids and potentially other medications (local anesthetic and normal saline) into the epidural space or surrounding tissue of the spine. There are significant risks of this procedure which include and are not limited to infection, bleeding, worsening pain, dural puncture leading to postdural puncture headache, nerve damage, spinal cord injury, paralysis, stroke, and death.  There is a chance that the procedure does not improve their pain.  There are risks associated with the steroid being absorbed into the body systemically. These include dysphoria, difficulty sleeping, mood swings and personality changes. Premenopausal women may notice an irregularity in her menstrual cycle for 2-3 months following the injection. Steroids can specifically affect patients with hypertension, diabetes, and peptic ulcers. The procedure may cause a temporary increase in blood pressure and blood pressure, and may adversely affect a peptic ulcer. Other, more rare complications, include avascular necrosis of joints, glaucoma and worsening of osteoporosis.  I have discussed the risks of the procedure at length with the patient, and the potential benefits of pain relief. I have offered alternatives to the procedure. All questions were answered.  The patient expressed understanding and wishes to proceed with the procedure.    > Longitudinal management of Complex Painful condition   The patient is being managed for a complex condition that requires ongoing management.  The nature of this condition demands nuanced approach to treatment.  The seriousness of the condition necessitates an in-depth and focused approach to management and coordination with other healthcare professionals.    This visit involves intricate evaluation and management of the patient's condition.  The complexity of the visit was due to the need for detailed assessment of the current state, consideration of potential complications and a careful balancing of treatment options to management the chronic condition effectively.   As detailed above, the patient has a chronic significant painful condition that requires regular and detailed management.  The condition's impact on the patient's quality of life and health is substantial and necessitates a comprehensive and tailored approach   >> Conclusion   There were no barriers to communication. Informed patient that I would be available for any additional questions. Patient was instructed to call with any worsening symptoms including severe pain, new numbness/weakness, or changes in the bowel/bladder function. Discussed role of nsaids in pain management and all relevant risks, if patient is continuing to require after 4 weeks the patient should f/u for alternative treatment. Instructed patient to maintain pain diary to monitor pain level, mobility, and function.

## 2024-03-19 NOTE — HISTORY OF PRESENT ILLNESS
[Back Pain] : back pain [___ mths] : [unfilled] month(s) ago [Constant] : constant [Dull] : dull [Burning] : burning [Shooting] : shooting [Standing] : standing [Transitioning] : transitioning [Bending] : bending [Medications] : medications [Other: ___] : [unfilled] [8] : 3. What number best describes how, during the past week, pain has interfered with your general activity? 8/10 pain [FreeTextEntry7] : Lower back , radiates down right leg [FreeTextEntry1] : Interval Note:  sp BILATERAL L4-sacral ala diagnostic medial branch block (2 joints, 3 nerves on each side) 3/15/24 with 100% improvement in back pain.   Patient reports that pain is returning.  Denies any additional weakness, numbness, bowel/bladder dysfunction.    HPI     Mr. LATISHA WEBSTER is a 73 year M with pmhx of prostate cancer (s/p prostatectomy 2017- mild urinary incontinence since), BCC (s/p rsxn Dr Joya), psoriasis (methotrexate), HTN, hypothyroid. Complains of bilateral lower extremity numbness/tingling and pain to posterior leg when walking. Began 1 year ago. Had L4-5 MARIE Dec `22 with Dr Ortega with relief x 1 month. Was partaking in PT 3x per week pre and post injection, unsure if helpful. Tylenol, Ibuprofen with no relief. Tried Gabapentin with adverse effects. Denies any additional weakness, numbness, bowel/bladder dysfunction.     Previous and current pain medications/doses/effects: Tylenol, Ibuprofen     Previous Pain Treatments: Physical therapy     Previous Pain Injections:  BILATERAL L4-sacral ala diagnostic medial branch block (2 joints, 3 nerves on each side) 3/15/24 BILATERAL L4-sacral ala diagnostic medial branch block (2 joints, 3 nerves on each side) 2/27/24  L5-S1 interlaminar epidural steroid injection 1/9/24 L5-S1 interlaminar epidural steroid injection 7/27/23   L4-5 MARIE Dec `22-  Dr Ortega      Previous Diagnostic Studies/Images: 11/21/22 Exam: MRI LUMBAR SPINE  Order#: [id number]     EXAM: MRI lumbar spine without contrast   INDICATION: Lumbar back pain   TECHNIQUE: MR exam of lumbar spine without contrast utilizing coronal T2, sagittal T2 STIR,  sagittal T2, sagittal T1, axial T1, axial T2 sequences   COMPARISON: October 9, 2014 MRI lumbar spine    FINDINGS:     There appear to be 5 nonrib-bearing lumbar type vertebral bodies. For purposes of this report,  vertebral body at level of the iliolumbar ligament will be designated as L5. Inferiormost well- formed intervertebral disc space will be designated as L5-S1. No MR evidence for transitional  lumbosacral anatomy.     Lumbar lordosis is maintained. No significant spondylolisthesis. Vertebral body heights are  maintained. There are multilevel degenerative endplate changes with osteophyte formation, inter vertebral disc space narrowing/desiccation, Schmorl's nodes and endplate irregularity. Vertebral  body bone marrow signal within normal limits. No abnormal cord signal identified. Conus terminates  at L1-L2. No significant periarticular or paraspinal soft tissue edema is identified. No suspicious  expansile or destructive osseous lesion identified. Paraspinal soft tissues are unremarkable.    There are multilevel findings as follows with comparison made to 2014 exam:      T12-L1: No significant canal or foraminal stenosis. Unchanged    L1-L2: No significant canal or foraminal stenosis. Unchanged    L2-L3: Disc bulge, bilateral facet arthropathy, ligamentous hypertrophy contributing to mild canal  stenosis and mild bilateral foraminal stenosis. Unchanged    L3-L4: Disc bulge, bilateral facet arthropathy, ligamentous hypertrophy contributing to moderate to severe canal stenosis and moderate bilateral foraminal stenosis.    L4-L5: Disc bulge, bilateral facet arthropathy, ligamentous hypertrophy contributing to severe  canal stenosis and moderate to severe bilateral foraminal stenosis. Severe bilateral lateral recess  stenosis. Findings are progressed.   L5-S1: Disc bulge, bilateral facet arthropathy, ligamentous hypertrophy contributing to no  significant canal stenosis and mild bilateral foraminal stenosis. Unchanged       IMPRESSION:    Multilevel lumbar spondylitic changes as detailed above most notable at the L3-L4, L4-L5 levels  where there is moderate severe canal stenosis, moderate to severe bilateral foraminal stenosis,  involving exiting L3, L4 nerve roots, respectively and moderate to severe bilateral lateral recess  stenosis, involving descending L4 and L5 nerve roots, respectively. Findings at these levels have  progressed when compared to the 2014 exam. Additional levels are not significantly changed..     If clinicians have any questions/need for clarification regarding this report, Dr. Kaleb Metcalf can be best reached via the patient [facility] [employer] system      [FreeTextEntry4] : tylenol [de-identified] : tri and hermelindo [FreeTextEntry2] : 24

## 2024-04-19 ENCOUNTER — TRANSCRIPTION ENCOUNTER (OUTPATIENT)
Age: 74
End: 2024-04-19

## 2024-04-19 ENCOUNTER — APPOINTMENT (OUTPATIENT)
Dept: PAIN MANAGEMENT | Facility: HOSPITAL | Age: 74
End: 2024-04-19

## 2024-04-25 ENCOUNTER — APPOINTMENT (OUTPATIENT)
Dept: RHEUMATOLOGY | Facility: CLINIC | Age: 74
End: 2024-04-25
Payer: MEDICARE

## 2024-04-25 VITALS
BODY MASS INDEX: 24.35 KG/M2 | DIASTOLIC BLOOD PRESSURE: 70 MMHG | WEIGHT: 124 LBS | HEART RATE: 59 BPM | OXYGEN SATURATION: 95 % | SYSTOLIC BLOOD PRESSURE: 148 MMHG | HEIGHT: 60 IN

## 2024-04-25 DIAGNOSIS — L40.50 ARTHROPATHIC PSORIASIS, UNSPECIFIED: ICD-10-CM

## 2024-04-25 DIAGNOSIS — L40.9 PSORIASIS, UNSPECIFIED: ICD-10-CM

## 2024-04-25 PROCEDURE — G2211 COMPLEX E/M VISIT ADD ON: CPT

## 2024-04-25 PROCEDURE — 99214 OFFICE O/P EST MOD 30 MIN: CPT

## 2024-04-25 RX ORDER — DOXYCYCLINE HYCLATE 100 MG/1
100 CAPSULE ORAL
Qty: 14 | Refills: 0 | Status: DISCONTINUED | COMMUNITY
Start: 2023-11-09 | End: 2024-04-25

## 2024-04-25 RX ORDER — LOSARTAN POTASSIUM 25 MG/1
25 TABLET, FILM COATED ORAL
Qty: 90 | Refills: 3 | Status: ACTIVE | COMMUNITY
Start: 2019-06-04 | End: 1900-01-01

## 2024-04-25 NOTE — HISTORY OF PRESENT ILLNESS
[___ Month(s) Ago] : [unfilled] month(s) ago [FreeTextEntry1] : patient without inflammatory joint complains no dactylitis no synovitis no clinical am stiffness Xray with [facility] joint arthrosis had a back nerve ablation last week

## 2024-04-25 NOTE — PHYSICAL EXAM
[General Appearance - Alert] : alert [Auscultation Breath Sounds / Voice Sounds] : lungs were clear to auscultation bilaterally [Heart Rate And Rhythm] : heart rate was normal and rhythm regular [Heart Sounds] : normal S1 and S2 [] : no rash [No Focal Deficits] : no focal deficits [Oriented To Time, Place, And Person] : oriented to person, place, and time [FreeTextEntry1] : hand, fixed flexiton contracture of PIP b/l, multiple subtle DIP/PIP hypertrophic changes

## 2024-04-28 LAB
ALBUMIN SERPL ELPH-MCNC: 4.4 G/DL
ALP BLD-CCNC: 85 U/L
ALT SERPL-CCNC: 18 U/L
ANION GAP SERPL CALC-SCNC: 17 MMOL/L
AST SERPL-CCNC: 29 U/L
BASOPHILS # BLD AUTO: 0.06 K/UL
BASOPHILS NFR BLD AUTO: 1 %
BILIRUB SERPL-MCNC: 0.4 MG/DL
BUN SERPL-MCNC: 19 MG/DL
CALCIUM SERPL-MCNC: 9.3 MG/DL
CHLORIDE SERPL-SCNC: 106 MMOL/L
CO2 SERPL-SCNC: 20 MMOL/L
CREAT SERPL-MCNC: 0.82 MG/DL
EGFR: 92 ML/MIN/1.73M2
EOSINOPHIL # BLD AUTO: 0.19 K/UL
EOSINOPHIL NFR BLD AUTO: 3 %
GLUCOSE SERPL-MCNC: 102 MG/DL
HCT VFR BLD CALC: 43.5 %
HGB BLD-MCNC: 13.8 G/DL
IMM GRANULOCYTES NFR BLD AUTO: 0.3 %
LYMPHOCYTES # BLD AUTO: 2.41 K/UL
LYMPHOCYTES NFR BLD AUTO: 38.7 %
MAN DIFF?: NORMAL
MCHC RBC-ENTMCNC: 31.7 GM/DL
MCHC RBC-ENTMCNC: 32.8 PG
MCV RBC AUTO: 103.3 FL
MONOCYTES # BLD AUTO: 0.44 K/UL
MONOCYTES NFR BLD AUTO: 7.1 %
NEUTROPHILS # BLD AUTO: 3.11 K/UL
NEUTROPHILS NFR BLD AUTO: 49.9 %
PLATELET # BLD AUTO: 194 K/UL
POTASSIUM SERPL-SCNC: 4.5 MMOL/L
PROT SERPL-MCNC: 6.9 G/DL
RBC # BLD: 4.21 M/UL
RBC # FLD: 13.8 %
SODIUM SERPL-SCNC: 143 MMOL/L
WBC # FLD AUTO: 6.23 K/UL

## 2024-05-01 RX ORDER — METHOTREXATE 2.5 MG/1
2.5 TABLET ORAL
Qty: 78 | Refills: 1 | Status: ACTIVE | COMMUNITY
Start: 2021-06-16 | End: 1900-01-01

## 2024-05-07 ENCOUNTER — APPOINTMENT (OUTPATIENT)
Dept: PAIN MANAGEMENT | Facility: CLINIC | Age: 74
End: 2024-05-07
Payer: MEDICARE

## 2024-05-07 VITALS
HEIGHT: 60 IN | SYSTOLIC BLOOD PRESSURE: 125 MMHG | BODY MASS INDEX: 24.35 KG/M2 | WEIGHT: 124 LBS | DIASTOLIC BLOOD PRESSURE: 68 MMHG

## 2024-05-07 PROCEDURE — G2211 COMPLEX E/M VISIT ADD ON: CPT

## 2024-05-07 PROCEDURE — 99214 OFFICE O/P EST MOD 30 MIN: CPT

## 2024-05-07 NOTE — HISTORY OF PRESENT ILLNESS
[Back Pain] : back pain [___ mths] : [unfilled] month(s) ago [Constant] : constant [Dull] : dull [Burning] : burning [Shooting] : shooting [Standing] : standing [Transitioning] : transitioning [Bending] : bending [Medications] : medications [Other: ___] : [unfilled] [8] : 3. What number best describes how, during the past week, pain has interfered with your general activity? 8/10 pain [FreeTextEntry1] : Interval Note: sp  LEFT AND RIGHT  L4-sacral ala medial branch RFA with 80% improvement in axial back pain. Patient reports minimal pain.  Able to perform adls.   Denies any additional weakness, numbness, bowel/bladder dysfunction.    HPI     Mr. LATISHA WEBSTER is a 73 year M with pmhx of prostate cancer (s/p prostatectomy 2017- mild urinary incontinence since), BCC (s/p rsxn Dr Joya), psoriasis (methotrexate), HTN, hypothyroid. Complains of bilateral lower extremity numbness/tingling and pain to posterior leg when walking. Began 1 year ago. Had L4-5 MARIE Dec `22 with Dr Ortega with relief x 1 month. Was partaking in PT 3x per week pre and post injection, unsure if helpful. Tylenol, Ibuprofen with no relief. Tried Gabapentin with adverse effects. Denies any additional weakness, numbness, bowel/bladder dysfunction.     Previous and current pain medications/doses/effects: Tylenol, Ibuprofen     Previous Pain Treatments: Physical therapy     Previous Pain Injections:  LEFT AND RIGHT  L4-sacral ala medial branch RFA 4/19/24 BILATERAL L4-sacral ala diagnostic medial branch block (2 joints, 3 nerves on each side) 3/15/24 BILATERAL L4-sacral ala diagnostic medial branch block (2 joints, 3 nerves on each side) 2/27/24  L5-S1 interlaminar epidural steroid injection 1/9/24 L5-S1 interlaminar epidural steroid injection 7/27/23   L4-5 MARIE Dec `22-  Dr Ortega      Previous Diagnostic Studies/Images: 11/21/22 Exam: MRI LUMBAR SPINE  Order#: [id number]     EXAM: MRI lumbar spine without contrast   INDICATION: Lumbar back pain   TECHNIQUE: MR exam of lumbar spine without contrast utilizing coronal T2, sagittal T2 STIR,  sagittal T2, sagittal T1, axial T1, axial T2 sequences   COMPARISON: October 9, 2014 MRI lumbar spine    FINDINGS:     There appear to be 5 nonrib-bearing lumbar type vertebral bodies. For purposes of this report,  vertebral body at level of the iliolumbar ligament will be designated as L5. Inferiormost well- formed intervertebral disc space will be designated as L5-S1. No MR evidence for transitional  lumbosacral anatomy.     Lumbar lordosis is maintained. No significant spondylolisthesis. Vertebral body heights are  maintained. There are multilevel degenerative endplate changes with osteophyte formation, inter vertebral disc space narrowing/desiccation, Schmorl's nodes and endplate irregularity. Vertebral  body bone marrow signal within normal limits. No abnormal cord signal identified. Conus terminates  at L1-L2. No significant periarticular or paraspinal soft tissue edema is identified. No suspicious  expansile or destructive osseous lesion identified. Paraspinal soft tissues are unremarkable.    There are multilevel findings as follows with comparison made to 2014 exam:      T12-L1: No significant canal or foraminal stenosis. Unchanged    L1-L2: No significant canal or foraminal stenosis. Unchanged    L2-L3: Disc bulge, bilateral facet arthropathy, ligamentous hypertrophy contributing to mild canal  stenosis and mild bilateral foraminal stenosis. Unchanged    L3-L4: Disc bulge, bilateral facet arthropathy, ligamentous hypertrophy contributing to moderate to severe canal stenosis and moderate bilateral foraminal stenosis.    L4-L5: Disc bulge, bilateral facet arthropathy, ligamentous hypertrophy contributing to severe  canal stenosis and moderate to severe bilateral foraminal stenosis. Severe bilateral lateral recess  stenosis. Findings are progressed.   L5-S1: Disc bulge, bilateral facet arthropathy, ligamentous hypertrophy contributing to no  significant canal stenosis and mild bilateral foraminal stenosis. Unchanged       IMPRESSION:    Multilevel lumbar spondylitic changes as detailed above most notable at the L3-L4, L4-L5 levels  where there is moderate severe canal stenosis, moderate to severe bilateral foraminal stenosis,  involving exiting L3, L4 nerve roots, respectively and moderate to severe bilateral lateral recess  stenosis, involving descending L4 and L5 nerve roots, respectively. Findings at these levels have  progressed when compared to the 2014 exam. Additional levels are not significantly changed..     If clinicians have any questions/need for clarification regarding this report, Dr. Kaleb Metcalf can be best reached via the patient [facility] email system      [FreeTextEntry7] : Lower back , radiates down right leg [de-identified] : tri and hermelindo [FreeTextEntry4] : tylenol [FreeTextEntry2] : 24

## 2024-05-07 NOTE — ASSESSMENT
[FreeTextEntry1] : >> Imaging and Other Studies  I personally reviewed the relevant imaging. Discussed and explained to patient the likely source of pathology and pain. Questions answered. MRI  >> Therapy and Other Modalities  start PT - referral provided  >> Medications  gabapentin uptitrate to TID cautioned change in mood.  Encouraged to call with any worsening mood or depression/suicidal ideations   acetaminophen 650mg q8h prn pain (caution <3g daily)  >> Interventions  given efficacy of previous intervention that is >80% improvement in pain and improved ability to perform adls, and return of pain despite conservative treatment, sp repeat L5-S1 interlaminar epidural steroid injection with significant improvement x 2   Significant component of axial back pain secondary to lumbar spondylosis and facet arthropathy demonstrated on MRI LS.  Pain refractory to conservative treatments.  sp BILATERAL L4-sacral ala diagnostic medial branch block (2 joints, 3 nerves on each side) with 90% improvement x 2  Given significant relief from diagnostic lumbar medial branch block of >80%, and significant improvement in function and continued lumbar facet arthropathy pain (demonstrated on imaging) and axial back pain, sp LEFT then RIGHT  L4-sacral ala medial branch (2 joints, 3 nerves on each side) radiofrequency ablation at 80C for 2:30 min with improvement  >> Consults  na  >> Discussion of Risks/Benefits/Alternatives   >Regarding any scheduled procedures:  I have discussed in detail with the patient that any interventional pain procedure is associated with potential risks. The procedure may include an injection of steroids and potentially other medications (local anesthetic and normal saline) into the epidural space or surrounding tissue of the spine. There are significant risks of this procedure which include and are not limited to infection, bleeding, worsening pain, dural puncture leading to postdural puncture headache, nerve damage, spinal cord injury, paralysis, stroke, and death.  There is a chance that the procedure does not improve their pain.  There are risks associated with the steroid being absorbed into the body systemically. These include dysphoria, difficulty sleeping, mood swings and personality changes. Premenopausal women may notice an irregularity in her menstrual cycle for 2-3 months following the injection. Steroids can specifically affect patients with hypertension, diabetes, and peptic ulcers. The procedure may cause a temporary increase in blood pressure and blood pressure, and may adversely affect a peptic ulcer. Other, more rare complications, include avascular necrosis of joints, glaucoma and worsening of osteoporosis.  I have discussed the risks of the procedure at length with the patient, and the potential benefits of pain relief. I have offered alternatives to the procedure. All questions were answered.  The patient expressed understanding and wishes to proceed with the procedure.    > Longitudinal management of Complex Painful condition   The patient is being managed for a complex condition that requires ongoing management.  The nature of this condition demands nuanced approach to treatment.  The seriousness of the condition necessitates an in-depth and focused approach to management and coordination with other healthcare professionals.    This visit involves intricate evaluation and management of the patient's condition.  The complexity of the visit was due to the need for detailed assessment of the current state, consideration of potential complications and a careful balancing of treatment options to management the chronic condition effectively.   As detailed above, the patient has a chronic significant painful condition that requires regular and detailed management.  The condition's impact on the patient's quality of life and health is substantial and necessitates a comprehensive and tailored approach   >> Conclusion   There were no barriers to communication. Informed patient that I would be available for any additional questions. Patient was instructed to call with any worsening symptoms including severe pain, new numbness/weakness, or changes in the bowel/bladder function. Discussed role of nsaids in pain management and all relevant risks, if patient is continuing to require after 4 weeks the patient should f/u for alternative treatment. Instructed patient to maintain pain diary to monitor pain level, mobility, and function.

## 2024-05-09 ENCOUNTER — RX RENEWAL (OUTPATIENT)
Age: 74
End: 2024-05-09

## 2024-05-09 RX ORDER — FLUTICASONE PROPIONATE 0.5 MG/G
0.05 CREAM TOPICAL TWICE DAILY
Qty: 60 | Refills: 0 | Status: ACTIVE | COMMUNITY
Start: 2024-01-18 | End: 1900-01-01

## 2024-05-09 RX ORDER — GABAPENTIN 300 MG/1
300 CAPSULE ORAL
Qty: 90 | Refills: 1 | Status: ACTIVE | COMMUNITY
Start: 2024-01-30 | End: 1900-01-01

## 2024-05-13 ENCOUNTER — RX RENEWAL (OUTPATIENT)
Age: 74
End: 2024-05-13

## 2024-05-13 RX ORDER — GABAPENTIN 100 MG/1
100 CAPSULE ORAL
Qty: 90 | Refills: 1 | Status: ACTIVE | COMMUNITY
Start: 2023-09-22 | End: 1900-01-01

## 2024-05-14 ENCOUNTER — APPOINTMENT (OUTPATIENT)
Dept: PAIN MANAGEMENT | Facility: HOSPITAL | Age: 74
End: 2024-05-14

## 2024-06-05 ENCOUNTER — APPOINTMENT (OUTPATIENT)
Dept: PAIN MANAGEMENT | Facility: CLINIC | Age: 74
End: 2024-06-05
Payer: MEDICARE

## 2024-06-05 VITALS
HEIGHT: 60 IN | WEIGHT: 124 LBS | SYSTOLIC BLOOD PRESSURE: 117 MMHG | BODY MASS INDEX: 24.35 KG/M2 | DIASTOLIC BLOOD PRESSURE: 82 MMHG

## 2024-06-05 DIAGNOSIS — M47.817 SPONDYLOSIS W/OUT MYELOPATHY OR RADICULOPATHY, LUMBOSACRAL REGION: ICD-10-CM

## 2024-06-05 DIAGNOSIS — G89.4 CHRONIC PAIN SYNDROME: ICD-10-CM

## 2024-06-05 DIAGNOSIS — M54.16 RADICULOPATHY, LUMBAR REGION: ICD-10-CM

## 2024-06-05 DIAGNOSIS — G62.9 POLYNEUROPATHY, UNSPECIFIED: ICD-10-CM

## 2024-06-05 PROCEDURE — G2211 COMPLEX E/M VISIT ADD ON: CPT

## 2024-06-05 PROCEDURE — 99214 OFFICE O/P EST MOD 30 MIN: CPT

## 2024-06-05 NOTE — HISTORY OF PRESENT ILLNESS
[Back Pain] : back pain [___ mths] : [unfilled] month(s) ago [Constant] : constant [Dull] : dull [Burning] : burning [Shooting] : shooting [Standing] : standing [Transitioning] : transitioning [Bending] : bending [Medications] : medications [Other: ___] : [unfilled] [8] : 3. What number best describes how, during the past week, pain has interfered with your general activity? 8/10 pain [FreeTextEntry1] : Interval Note:  sp  LEFT AND RIGHT  L4-sacral ala medial branch RFA with 80% improvement in axial back pain. Reports pain over right back, buttock and leg.  Pain is so bad that patient finds it difficult to perform adls and ambulate.  Denies any additional weakness, numbness, bowel/bladder dysfunction.    HPI     Mr. LATISHA WEBSTER is a 74 year M with pmhx of prostate cancer (s/p prostatectomy 2017- mild urinary incontinence since), BCC (s/p rsxn Dr Joya), psoriasis (methotrexate), HTN, hypothyroid. Complains of bilateral lower extremity numbness/tingling and pain to posterior leg when walking. Began 1 year ago. Had L4-5 MARIE Dec `22 with Dr Ortega with relief x 1 month. Was partaking in PT 3x per week pre and post injection, unsure if helpful. Tylenol, Ibuprofen with no relief. Tried Gabapentin with adverse effects. Denies any additional weakness, numbness, bowel/bladder dysfunction.     Previous and current pain medications/doses/effects: Tylenol, Ibuprofen     Previous Pain Treatments: Physical therapy     Previous Pain Injections:  LEFT AND RIGHT  L4-sacral ala medial branch RFA 4/19/24 BILATERAL L4-sacral ala diagnostic medial branch block (2 joints, 3 nerves on each side) 3/15/24 BILATERAL L4-sacral ala diagnostic medial branch block (2 joints, 3 nerves on each side) 2/27/24  L5-S1 interlaminar epidural steroid injection 1/9/24 L5-S1 interlaminar epidural steroid injection 7/27/23   L4-5 MARIE Dec `22-  Dr Ortega      Previous Diagnostic Studies/Images: 11/21/22 Exam: MRI LUMBAR SPINE  Order#: [id number]     EXAM: MRI lumbar spine without contrast   INDICATION: Lumbar back pain   TECHNIQUE: MR exam of lumbar spine without contrast utilizing coronal T2, sagittal T2 STIR,  sagittal T2, sagittal T1, axial T1, axial T2 sequences   COMPARISON: October 9, 2014 MRI lumbar spine    FINDINGS:     There appear to be 5 nonrib-bearing lumbar type vertebral bodies. For purposes of this report,  vertebral body at level of the iliolumbar ligament will be designated as L5. Inferiormost well- formed intervertebral disc space will be designated as L5-S1. No MR evidence for transitional  lumbosacral anatomy.     Lumbar lordosis is maintained. No significant spondylolisthesis. Vertebral body heights are  maintained. There are multilevel degenerative endplate changes with osteophyte formation, inter vertebral disc space narrowing/desiccation, Schmorl's nodes and endplate irregularity. Vertebral  body bone marrow signal within normal limits. No abnormal cord signal identified. Conus terminates  at L1-L2. No significant periarticular or paraspinal soft tissue edema is identified. No suspicious  expansile or destructive osseous lesion identified. Paraspinal soft tissues are unremarkable.    There are multilevel findings as follows with comparison made to 2014 exam:      T12-L1: No significant canal or foraminal stenosis. Unchanged    L1-L2: No significant canal or foraminal stenosis. Unchanged    L2-L3: Disc bulge, bilateral facet arthropathy, ligamentous hypertrophy contributing to mild canal  stenosis and mild bilateral foraminal stenosis. Unchanged    L3-L4: Disc bulge, bilateral facet arthropathy, ligamentous hypertrophy contributing to moderate to severe canal stenosis and moderate bilateral foraminal stenosis.    L4-L5: Disc bulge, bilateral facet arthropathy, ligamentous hypertrophy contributing to severe  canal stenosis and moderate to severe bilateral foraminal stenosis. Severe bilateral lateral recess  stenosis. Findings are progressed.   L5-S1: Disc bulge, bilateral facet arthropathy, ligamentous hypertrophy contributing to no  significant canal stenosis and mild bilateral foraminal stenosis. Unchanged       IMPRESSION:    Multilevel lumbar spondylitic changes as detailed above most notable at the L3-L4, L4-L5 levels  where there is moderate severe canal stenosis, moderate to severe bilateral foraminal stenosis,  involving exiting L3, L4 nerve roots, respectively and moderate to severe bilateral lateral recess  stenosis, involving descending L4 and L5 nerve roots, respectively. Findings at these levels have  progressed when compared to the 2014 exam. Additional levels are not significantly changed..     If clinicians have any questions/need for clarification regarding this report, Dr. Kaleb Metcalf can be best reached via the patient [facility] [employer] system      [FreeTextEntry7] : Lower back , radiates down right leg [de-identified] : tri and hermelindo [FreeTextEntry4] : tylenol [FreeTextEntry2] : 24

## 2024-06-05 NOTE — PHYSICAL EXAM
[Normal muscle bulk without asymmetry] : normal muscle bulk without asymmetry [SLR] : positive straight leg raise [] : Motor: [Normal] : Normal affect

## 2024-06-05 NOTE — ASSESSMENT
[FreeTextEntry1] : >> Imaging and Other Studies  I personally reviewed the relevant imaging. Discussed and explained to patient the likely source of pathology and pain. Questions answered. MRI  >> Therapy and Other Modalities  continue PT  >> Medications  gabapentin uptitrate to TID cautioned change in mood.  Encouraged to call with any worsening mood or depression/suicidal ideations   acetaminophen 650mg q8h prn pain (caution <3g daily)  >> Interventions  given efficacy of previous intervention that is >80% improvement in pain and improved ability to perform adls, and return of pain despite conservative treatment,  will schedule repeat L5-S1 interlaminar epidural steroid injection with significant improvement r/b/a discussed  Significant component of axial back pain secondary to lumbar spondylosis and facet arthropathy demonstrated on MRI LS.  Pain refractory to conservative treatments.  sp BILATERAL L4-sacral ala diagnostic medial branch block (2 joints, 3 nerves on each side) with 90% improvement x 2  Given significant relief from diagnostic lumbar medial branch block of >80%, and significant improvement in function and continued lumbar facet arthropathy pain (demonstrated on imaging) and axial back pain, sp LEFT then RIGHT  L4-sacral ala medial branch (2 joints, 3 nerves on each side) radiofrequency ablation at 80C for 2:30 min with improvement  >> Consults  na  >> Discussion of Risks/Benefits/Alternatives   >Regarding any scheduled procedures:  I have discussed in detail with the patient that any interventional pain procedure is associated with potential risks. The procedure may include an injection of steroids and potentially other medications (local anesthetic and normal saline) into the epidural space or surrounding tissue of the spine. There are significant risks of this procedure which include and are not limited to infection, bleeding, worsening pain, dural puncture leading to postdural puncture headache, nerve damage, spinal cord injury, paralysis, stroke, and death.  There is a chance that the procedure does not improve their pain.  There are risks associated with the steroid being absorbed into the body systemically. These include dysphoria, difficulty sleeping, mood swings and personality changes. Premenopausal women may notice an irregularity in her menstrual cycle for 2-3 months following the injection. Steroids can specifically affect patients with hypertension, diabetes, and peptic ulcers. The procedure may cause a temporary increase in blood pressure and blood pressure, and may adversely affect a peptic ulcer. Other, more rare complications, include avascular necrosis of joints, glaucoma and worsening of osteoporosis.  I have discussed the risks of the procedure at length with the patient, and the potential benefits of pain relief. I have offered alternatives to the procedure. All questions were answered.  The patient expressed understanding and wishes to proceed with the procedure.    > Longitudinal management of Complex Painful condition   The patient is being managed for a complex condition that requires ongoing management.  The nature of this condition demands nuanced approach to treatment.  The seriousness of the condition necessitates an in-depth and focused approach to management and coordination with other healthcare professionals.    This visit involves intricate evaluation and management of the patient's condition.  The complexity of the visit was due to the need for detailed assessment of the current state, consideration of potential complications and a careful balancing of treatment options to management the chronic condition effectively.   As detailed above, the patient has a chronic significant painful condition that requires regular and detailed management.  The condition's impact on the patient's quality of life and health is substantial and necessitates a comprehensive and tailored approach   >> Conclusion   There were no barriers to communication. Informed patient that I would be available for any additional questions. Patient was instructed to call with any worsening symptoms including severe pain, new numbness/weakness, or changes in the bowel/bladder function. Discussed role of nsaids in pain management and all relevant risks, if patient is continuing to require after 4 weeks the patient should f/u for alternative treatment. Instructed patient to maintain pain diary to monitor pain level, mobility, and function.

## 2024-06-12 ENCOUNTER — RX RENEWAL (OUTPATIENT)
Age: 74
End: 2024-06-12

## 2024-06-12 RX ORDER — FAMOTIDINE 40 MG/1
40 TABLET, FILM COATED ORAL
Qty: 90 | Refills: 3 | Status: ACTIVE | COMMUNITY
Start: 1900-01-01 | End: 1900-01-01

## 2024-06-23 ENCOUNTER — RX RENEWAL (OUTPATIENT)
Age: 74
End: 2024-06-23

## 2024-06-23 RX ORDER — LEVOTHYROXINE SODIUM 0.03 MG/1
25 TABLET ORAL
Qty: 90 | Refills: 3 | Status: ACTIVE | COMMUNITY
Start: 2018-12-23 | End: 1900-01-01

## 2024-06-24 RX ORDER — FOLIC ACID 1 MG/1
1 TABLET ORAL
Qty: 90 | Refills: 2 | Status: ACTIVE | COMMUNITY
Start: 2021-06-16 | End: 1900-01-01

## 2024-06-25 ENCOUNTER — APPOINTMENT (OUTPATIENT)
Dept: PAIN MANAGEMENT | Facility: CLINIC | Age: 74
End: 2024-06-25
Payer: MEDICARE

## 2024-06-25 VITALS
HEART RATE: 68 BPM | SYSTOLIC BLOOD PRESSURE: 159 MMHG | OXYGEN SATURATION: 98 % | RESPIRATION RATE: 16 BRPM | DIASTOLIC BLOOD PRESSURE: 81 MMHG

## 2024-06-25 DIAGNOSIS — M48.062 SPINAL STENOSIS, LUMBAR REGION WITH NEUROGENIC CLAUDICATION: ICD-10-CM

## 2024-06-25 PROCEDURE — 62323 NJX INTERLAMINAR LMBR/SAC: CPT

## 2024-06-25 RX ADMIN — TRIAMCINOLONE ACETONIDE 0 MG/ML: 40 INJECTION, SUSPENSION INTRA-ARTICULAR; INTRAMUSCULAR at 00:00

## 2024-07-10 ENCOUNTER — APPOINTMENT (OUTPATIENT)
Dept: ENDOCRINOLOGY | Facility: CLINIC | Age: 74
End: 2024-07-10
Payer: MEDICARE

## 2024-07-10 ENCOUNTER — APPOINTMENT (OUTPATIENT)
Dept: INTERNAL MEDICINE | Facility: CLINIC | Age: 74
End: 2024-07-10
Payer: MEDICARE

## 2024-07-10 VITALS
DIASTOLIC BLOOD PRESSURE: 74 MMHG | SYSTOLIC BLOOD PRESSURE: 132 MMHG | WEIGHT: 118.31 LBS | OXYGEN SATURATION: 98 % | HEART RATE: 70 BPM | HEIGHT: 60 IN | BODY MASS INDEX: 23.23 KG/M2

## 2024-07-10 VITALS
OXYGEN SATURATION: 98 % | HEIGHT: 60 IN | WEIGHT: 118.31 LBS | BODY MASS INDEX: 23.23 KG/M2 | HEART RATE: 70 BPM | SYSTOLIC BLOOD PRESSURE: 132 MMHG | DIASTOLIC BLOOD PRESSURE: 74 MMHG

## 2024-07-10 DIAGNOSIS — R70.0 ELEVATED ERYTHROCYTE SEDIMENTATION RATE: ICD-10-CM

## 2024-07-10 DIAGNOSIS — E88.810 METABOLIC SYNDROME: ICD-10-CM

## 2024-07-10 DIAGNOSIS — I10 ESSENTIAL (PRIMARY) HYPERTENSION: ICD-10-CM

## 2024-07-10 DIAGNOSIS — M54.30 SCIATICA, UNSPECIFIED SIDE: ICD-10-CM

## 2024-07-10 DIAGNOSIS — Z87.898 PERSONAL HISTORY OF OTHER SPECIFIED CONDITIONS: ICD-10-CM

## 2024-07-10 DIAGNOSIS — M79.606 PAIN IN LEG, UNSPECIFIED: ICD-10-CM

## 2024-07-10 DIAGNOSIS — D22.9 MELANOCYTIC NEVI, UNSPECIFIED: ICD-10-CM

## 2024-07-10 DIAGNOSIS — R63.4 ABNORMAL WEIGHT LOSS: ICD-10-CM

## 2024-07-10 DIAGNOSIS — J34.3 HYPERTROPHY OF NASAL TURBINATES: ICD-10-CM

## 2024-07-10 DIAGNOSIS — Z86.39 PERSONAL HISTORY OF OTHER ENDOCRINE, NUTRITIONAL AND METABOLIC DISEASE: ICD-10-CM

## 2024-07-10 DIAGNOSIS — E03.9 HYPOTHYROIDISM, UNSPECIFIED: ICD-10-CM

## 2024-07-10 DIAGNOSIS — D64.9 ANEMIA, UNSPECIFIED: ICD-10-CM

## 2024-07-10 DIAGNOSIS — E53.1 PYRIDOXINE DEFICIENCY: ICD-10-CM

## 2024-07-10 DIAGNOSIS — R73.09 OTHER ABNORMAL GLUCOSE: ICD-10-CM

## 2024-07-10 DIAGNOSIS — K21.9 GASTRO-ESOPHAGEAL REFLUX DISEASE W/OUT ESOPHAGITIS: ICD-10-CM

## 2024-07-10 DIAGNOSIS — M54.50 LOW BACK PAIN, UNSPECIFIED: ICD-10-CM

## 2024-07-10 DIAGNOSIS — Z87.2 PERSONAL HISTORY OF DISEASES OF THE SKIN AND SUBCUTANEOUS TISSUE: ICD-10-CM

## 2024-07-10 LAB
ERYTHROCYTE [SEDIMENTATION RATE] IN BLOOD BY WESTERGREN METHOD: 17 MM/HR
FRUCTOSAMINE SERPL-MCNC: 241 UMOL/L
T4 SERPL-MCNC: 8.5 UG/DL

## 2024-07-10 PROCEDURE — 36415 COLL VENOUS BLD VENIPUNCTURE: CPT

## 2024-07-10 PROCEDURE — 99214 OFFICE O/P EST MOD 30 MIN: CPT

## 2024-07-10 PROCEDURE — G2211 COMPLEX E/M VISIT ADD ON: CPT

## 2024-07-10 PROCEDURE — 99215 OFFICE O/P EST HI 40 MIN: CPT

## 2024-07-10 RX ORDER — MELOXICAM 15 MG/1
15 TABLET ORAL
Qty: 30 | Refills: 1 | Status: ACTIVE | COMMUNITY
Start: 2024-07-10 | End: 1900-01-01

## 2024-07-11 LAB
ALT SERPL-CCNC: 25 U/L
ANION GAP SERPL CALC-SCNC: 14 MMOL/L
AST SERPL-CCNC: 30 U/L
BILIRUB SERPL-MCNC: 0.6 MG/DL
BUN SERPL-MCNC: 27 MG/DL
CALCIUM SERPL-MCNC: 9.5 MG/DL
CHLORIDE SERPL-SCNC: 104 MMOL/L
CREAT SERPL-MCNC: 0.98 MG/DL
EGFR: 80 ML/MIN/1.73M2
ESTIMATED AVERAGE GLUCOSE: 134 MG/DL
GLUCOSE SERPL-MCNC: 96 MG/DL
HBA1C MFR BLD HPLC: 6.3 %
HCT VFR BLD CALC: 43.9 %
HGB BLD-MCNC: 14.1 G/DL
MCHC RBC-ENTMCNC: 32.1 GM/DL
MCHC RBC-ENTMCNC: 33.7 PG
POTASSIUM SERPL-SCNC: 4.6 MMOL/L
PROT SERPL-MCNC: 6.7 G/DL
RBC # BLD: 4.19 M/UL
RBC # FLD: 14.6 %
SODIUM SERPL-SCNC: 141 MMOL/L
TSH SERPL-ACNC: 1.82 UIU/ML
WBC # FLD AUTO: 8.79 K/UL

## 2024-07-15 LAB — VIT B6 SERPL-MCNC: 17.5 UG/L

## 2024-07-16 ENCOUNTER — APPOINTMENT (OUTPATIENT)
Dept: PAIN MANAGEMENT | Facility: CLINIC | Age: 74
End: 2024-07-16
Payer: MEDICARE

## 2024-07-16 VITALS
HEIGHT: 60 IN | DIASTOLIC BLOOD PRESSURE: 66 MMHG | WEIGHT: 118 LBS | SYSTOLIC BLOOD PRESSURE: 125 MMHG | BODY MASS INDEX: 23.16 KG/M2

## 2024-07-16 DIAGNOSIS — G62.9 POLYNEUROPATHY, UNSPECIFIED: ICD-10-CM

## 2024-07-16 DIAGNOSIS — G89.4 CHRONIC PAIN SYNDROME: ICD-10-CM

## 2024-07-16 DIAGNOSIS — M54.16 RADICULOPATHY, LUMBAR REGION: ICD-10-CM

## 2024-07-16 DIAGNOSIS — M47.817 SPONDYLOSIS W/OUT MYELOPATHY OR RADICULOPATHY, LUMBOSACRAL REGION: ICD-10-CM

## 2024-07-16 DIAGNOSIS — M48.062 SPINAL STENOSIS, LUMBAR REGION WITH NEUROGENIC CLAUDICATION: ICD-10-CM

## 2024-07-16 PROCEDURE — 99214 OFFICE O/P EST MOD 30 MIN: CPT

## 2024-07-16 PROCEDURE — G2211 COMPLEX E/M VISIT ADD ON: CPT

## 2024-07-17 ENCOUNTER — APPOINTMENT (OUTPATIENT)
Dept: INTERNAL MEDICINE | Facility: CLINIC | Age: 74
End: 2024-07-17

## 2024-07-19 LAB — DHEA-SULFATE, SERUM: 15 UG/DL

## 2024-07-23 ENCOUNTER — APPOINTMENT (OUTPATIENT)
Dept: RHEUMATOLOGY | Facility: CLINIC | Age: 74
End: 2024-07-23
Payer: MEDICARE

## 2024-07-23 VITALS
HEART RATE: 64 BPM | SYSTOLIC BLOOD PRESSURE: 112 MMHG | DIASTOLIC BLOOD PRESSURE: 70 MMHG | BODY MASS INDEX: 23.16 KG/M2 | WEIGHT: 118 LBS | HEIGHT: 60 IN | OXYGEN SATURATION: 98 %

## 2024-07-23 DIAGNOSIS — L40.50 ARTHROPATHIC PSORIASIS, UNSPECIFIED: ICD-10-CM

## 2024-07-23 DIAGNOSIS — L40.9 PSORIASIS, UNSPECIFIED: ICD-10-CM

## 2024-07-23 PROCEDURE — G2211 COMPLEX E/M VISIT ADD ON: CPT

## 2024-07-23 PROCEDURE — 99214 OFFICE O/P EST MOD 30 MIN: CPT

## 2024-07-23 RX ORDER — ACETAMINOPHEN ER 650 MG TABLET,EXTENDED RELEASE 650 MG
TABLET, EXTENDED RELEASE ORAL
Refills: 0 | Status: ACTIVE | COMMUNITY

## 2024-07-23 NOTE — HISTORY OF PRESENT ILLNESS
[___ Month(s) Ago] : [unfilled] month(s) ago [FreeTextEntry1] : predominant axial pain non inflammatory characteristics had radio ablation Radha epidural injections transitory relief pain return again right leg posterior radiation no clinical am stiffness no synovitis or dactylitis d/c gabapentin on meloxicam

## 2024-08-08 ENCOUNTER — APPOINTMENT (OUTPATIENT)
Dept: INTERNAL MEDICINE | Facility: CLINIC | Age: 74
End: 2024-08-08

## 2024-08-08 PROBLEM — R21 RASH: Status: ACTIVE | Noted: 2024-08-08

## 2024-08-08 PROCEDURE — 99213 OFFICE O/P EST LOW 20 MIN: CPT

## 2024-08-08 PROCEDURE — G2211 COMPLEX E/M VISIT ADD ON: CPT

## 2024-08-08 NOTE — PHYSICAL EXAM
[Normal] : no respiratory distress, lungs were clear to auscultation bilaterally and no accessory muscle use [Normal Rate] : normal rate  [Regular Rhythm] : with a regular rhythm [Normal S1, S2] : normal S1 and S2 [Coordination Grossly Intact] : coordination grossly intact [No Focal Deficits] : no focal deficits [Normal Gait] : normal gait [Speech Grossly Normal] : speech grossly normal [Normal Affect] : the affect was normal [Alert and Oriented x3] : oriented to person, place, and time [Normal Mood] : the mood was normal [Normal Insight/Judgement] : insight and judgment were intact [de-identified] : + macular, nonblanching, non tender erythematous rash on left forearm.

## 2024-08-08 NOTE — HISTORY OF PRESENT ILLNESS
[FreeTextEntry8] : Rash Left Forearm x 2 days. He was cutting bushes and pulled a vine out and then the rash started. Not open, + itchiness, macular, erythematous. Non tender, No fever.

## 2024-08-08 NOTE — HEALTH RISK ASSESSMENT
[No] : In the past 12 months have you used drugs other than those required for medical reasons? No [No falls in past year] : Patient reported no falls in the past year [0] : 2) Feeling down, depressed, or hopeless: Not at all (0) [Never] : Never [de-identified] : walking [de-identified] : regular diet  [[id number]] : 0

## 2024-09-17 ENCOUNTER — APPOINTMENT (OUTPATIENT)
Dept: PAIN MANAGEMENT | Facility: CLINIC | Age: 74
End: 2024-09-17

## 2024-09-18 ENCOUNTER — APPOINTMENT (OUTPATIENT)
Dept: ORTHOPEDIC SURGERY | Facility: CLINIC | Age: 74
End: 2024-09-18
Payer: MEDICARE

## 2024-09-18 VITALS — HEIGHT: 60 IN | BODY MASS INDEX: 23.56 KG/M2 | WEIGHT: 120 LBS

## 2024-09-18 DIAGNOSIS — M54.16 RADICULOPATHY, LUMBAR REGION: ICD-10-CM

## 2024-09-18 PROCEDURE — G2211 COMPLEX E/M VISIT ADD ON: CPT

## 2024-09-18 PROCEDURE — 99204 OFFICE O/P NEW MOD 45 MIN: CPT

## 2024-09-18 PROCEDURE — 72110 X-RAY EXAM L-2 SPINE 4/>VWS: CPT

## 2024-09-18 NOTE — PHYSICAL EXAM
[de-identified] : Patient is NAD, AAOX3 skin is intact, NTTP in LS SPINE mild pain with ROM 5/5 motor strength in BLE SILT L1-S1 BLE POSITIVE straight leg raise Negative ida equal patella/achilles reflex normal gait [de-identified] : X-rays and MRI from [facility] from 2022 demonstrates L4-5 severe stenosis L3-L4 moderate stenosis.  There is no spondylolisthesis

## 2024-09-18 NOTE — ASSESSMENT
[FreeTextEntry1] : I discussed at length with the patient nature of the condition.  The patient comes in with ongoing low back pain and claudication type symptoms and the MRI demonstrates L4-5 severe stenosis.  Pain score 7 out of 10.  The patient has tried conservative treatment occluding physical therapy medications and injections.  The patient is a candidate for surgery.  This would be an L4-5 decompression.  We will get updated MRI.  Surgery performed at [facility].  Patient would like to think about this.

## 2024-09-18 NOTE — HISTORY OF PRESENT ILLNESS
[de-identified] : The patient is a very pleasant 74-year-old here for evaluation of back and pain down both of his legs.  The patient has difficulty with standing and walking.  The patient reports having multiple injections with Dr. Bhardwaj without dramatic improvement.  The pain is worst on the right side.  Has pain to the buttock hamstring and posterior lateral calf.  Patient also has symptoms on the left side.  Right side is worse.  The patient has done physical therapy.  The patient does have a history of prostate cancer, psoriasis.  Has no issues with bowel bladder incontinence.

## 2024-09-21 ENCOUNTER — RESULT REVIEW (OUTPATIENT)
Age: 74
End: 2024-09-21

## 2024-10-02 ENCOUNTER — APPOINTMENT (OUTPATIENT)
Dept: ORTHOPEDIC SURGERY | Facility: CLINIC | Age: 74
End: 2024-10-02
Payer: MEDICARE

## 2024-10-02 VITALS — BODY MASS INDEX: 25.13 KG/M2 | HEIGHT: 60 IN | WEIGHT: 128 LBS

## 2024-10-02 DIAGNOSIS — M48.062 SPINAL STENOSIS, LUMBAR REGION WITH NEUROGENIC CLAUDICATION: ICD-10-CM

## 2024-10-02 DIAGNOSIS — M54.16 RADICULOPATHY, LUMBAR REGION: ICD-10-CM

## 2024-10-02 PROCEDURE — G2211 COMPLEX E/M VISIT ADD ON: CPT

## 2024-10-02 PROCEDURE — 99214 OFFICE O/P EST MOD 30 MIN: CPT

## 2024-10-02 NOTE — HISTORY OF PRESENT ILLNESS
[de-identified] : Patient is a very pleasant 74-year-old here for follow-up of his back and right leg issues.  The patient comes in with his MRI.  He continues to have pain down the right leg when he is standing and walking.  He is still conservative including physical therapy medications and an epidural about 2 months ago.  The epidural did not help him significantly.  No issues with gait or balance or bowel bladder incontinence today.

## 2024-10-02 NOTE — ASSESSMENT
[FreeTextEntry1] : I discussed at length with the patient nature of the condition.  The patient has severe stenosis at L4-L5.  He has failed therapy medications and injections. I discussed at length the nature of the patient's condition. the patient has failed extensive conservative treatment. I discussed the possibility of a lumbar laminectomy . Risks of the procedure were discussed which include but not limited to infection, bleeding, nerve injury, injury to dural sac, reherniation, nerve injury, foot drop, need for additional surgery, risk of anesthesia including death and paralysis. After discussion of risks and benefits, pt elected to proceed. The patient will need med clearance. Surgery will be performed in an outpatient manner. negative...

## 2024-10-02 NOTE — PHYSICAL EXAM
[de-identified] : Patient is NAD, AAOX3 skin is intact, NTTP in LS SPINE mild pain with ROM 5/5 motor strength in BLE SILT L1-S1 BLE POSITIVE straight leg raise Negative ida equal patella/achilles reflex normal gait [de-identified] : Imaging demonstrates severe stenosis at L4-L5.  There is multilevel degenerative disc disease.  There is no spondylolisthesis

## 2024-10-11 ENCOUNTER — RESULT REVIEW (OUTPATIENT)
Age: 74
End: 2024-10-11

## 2024-11-05 RX ORDER — ONDANSETRON 4 MG/1
4 TABLET, ORALLY DISINTEGRATING ORAL
Qty: 10 | Refills: 0 | Status: ACTIVE | COMMUNITY
Start: 2024-11-05 | End: 1900-01-01

## 2024-11-05 RX ORDER — DOCUSATE SODIUM 100 MG/1
100 CAPSULE ORAL TWICE DAILY
Qty: 20 | Refills: 0 | Status: ACTIVE | COMMUNITY
Start: 2024-11-05 | End: 1900-01-01

## 2024-11-05 RX ORDER — ACETAMINOPHEN 500 MG/1
500 TABLET ORAL
Qty: 50 | Refills: 0 | Status: ACTIVE | COMMUNITY
Start: 2024-11-05 | End: 1900-01-01

## 2024-11-05 RX ORDER — CYCLOBENZAPRINE HYDROCHLORIDE 5 MG/1
5 TABLET, FILM COATED ORAL
Qty: 30 | Refills: 0 | Status: ACTIVE | COMMUNITY
Start: 2024-11-05 | End: 1900-01-01

## 2024-11-05 RX ORDER — OXYCODONE 5 MG/1
5 TABLET ORAL
Qty: 30 | Refills: 0 | Status: ACTIVE | COMMUNITY
Start: 2024-11-05 | End: 1900-01-01

## 2024-11-07 ENCOUNTER — APPOINTMENT (OUTPATIENT)
Dept: ORTHOPEDIC SURGERY | Facility: HOSPITAL | Age: 74
End: 2024-11-07

## 2024-11-07 ENCOUNTER — RESULT REVIEW (OUTPATIENT)
Age: 74
End: 2024-11-07

## 2024-11-07 ENCOUNTER — TRANSCRIPTION ENCOUNTER (OUTPATIENT)
Age: 74
End: 2024-11-07

## 2024-11-07 PROCEDURE — 63047 LAM FACETEC & FORAMOT LUMBAR: CPT

## 2024-11-20 ENCOUNTER — APPOINTMENT (OUTPATIENT)
Dept: ORTHOPEDIC SURGERY | Facility: CLINIC | Age: 74
End: 2024-11-20
Payer: MEDICARE

## 2024-11-20 VITALS — WEIGHT: 127 LBS | HEIGHT: 60 IN | BODY MASS INDEX: 24.94 KG/M2

## 2024-11-20 DIAGNOSIS — M48.062 SPINAL STENOSIS, LUMBAR REGION WITH NEUROGENIC CLAUDICATION: ICD-10-CM

## 2024-11-20 PROCEDURE — 99024 POSTOP FOLLOW-UP VISIT: CPT

## 2024-11-20 RX ORDER — DOCUSATE SODIUM 100 MG/1
100 CAPSULE ORAL TWICE DAILY
Qty: 20 | Refills: 0 | Status: ACTIVE | COMMUNITY
Start: 2024-11-20 | End: 1900-01-01

## 2024-11-20 RX ORDER — ACETAMINOPHEN 500 MG/1
500 TABLET ORAL
Qty: 50 | Refills: 0 | Status: ACTIVE | COMMUNITY
Start: 2024-11-20 | End: 1900-01-01

## 2024-11-21 ENCOUNTER — APPOINTMENT (OUTPATIENT)
Dept: RHEUMATOLOGY | Facility: CLINIC | Age: 74
End: 2024-11-21
Payer: MEDICARE

## 2024-11-21 VITALS
HEIGHT: 60 IN | BODY MASS INDEX: 23.56 KG/M2 | WEIGHT: 120 LBS | HEART RATE: 68 BPM | OXYGEN SATURATION: 98 % | DIASTOLIC BLOOD PRESSURE: 60 MMHG | SYSTOLIC BLOOD PRESSURE: 110 MMHG

## 2024-11-21 DIAGNOSIS — L40.9 PSORIASIS, UNSPECIFIED: ICD-10-CM

## 2024-11-21 DIAGNOSIS — L40.50 ARTHROPATHIC PSORIASIS, UNSPECIFIED: ICD-10-CM

## 2024-11-21 PROCEDURE — 99214 OFFICE O/P EST MOD 30 MIN: CPT

## 2024-12-20 ENCOUNTER — APPOINTMENT (OUTPATIENT)
Dept: ORTHOPEDIC SURGERY | Facility: CLINIC | Age: 74
End: 2024-12-20
Payer: MEDICARE

## 2024-12-20 VITALS — SYSTOLIC BLOOD PRESSURE: 137 MMHG | HEART RATE: 64 BPM | DIASTOLIC BLOOD PRESSURE: 59 MMHG | OXYGEN SATURATION: 99 %

## 2024-12-20 DIAGNOSIS — M54.16 RADICULOPATHY, LUMBAR REGION: ICD-10-CM

## 2024-12-20 PROCEDURE — 99024 POSTOP FOLLOW-UP VISIT: CPT

## 2024-12-20 RX ORDER — ACETAMINOPHEN EXTRA STRENGTH 500 MG/1
500 TABLET ORAL
Qty: 40 | Refills: 0 | Status: ACTIVE | COMMUNITY
Start: 2024-12-20 | End: 1900-01-01

## 2025-01-22 ENCOUNTER — APPOINTMENT (OUTPATIENT)
Dept: INTERNAL MEDICINE | Facility: CLINIC | Age: 75
End: 2025-01-22
Payer: MEDICARE

## 2025-01-22 VITALS
BODY MASS INDEX: 23.56 KG/M2 | RESPIRATION RATE: 16 BRPM | TEMPERATURE: 97.2 F | SYSTOLIC BLOOD PRESSURE: 124 MMHG | HEIGHT: 60 IN | HEART RATE: 66 BPM | WEIGHT: 120 LBS | DIASTOLIC BLOOD PRESSURE: 70 MMHG | OXYGEN SATURATION: 99 %

## 2025-01-22 DIAGNOSIS — N20.0 CALCULUS OF KIDNEY: ICD-10-CM

## 2025-01-22 DIAGNOSIS — M54.50 LOW BACK PAIN, UNSPECIFIED: ICD-10-CM

## 2025-01-22 DIAGNOSIS — R73.09 OTHER ABNORMAL GLUCOSE: ICD-10-CM

## 2025-01-22 DIAGNOSIS — I77.810 THORACIC AORTIC ECTASIA: ICD-10-CM

## 2025-01-22 DIAGNOSIS — M54.16 RADICULOPATHY, LUMBAR REGION: ICD-10-CM

## 2025-01-22 DIAGNOSIS — M48.062 SPINAL STENOSIS, LUMBAR REGION WITH NEUROGENIC CLAUDICATION: ICD-10-CM

## 2025-01-22 DIAGNOSIS — L40.50 ARTHROPATHIC PSORIASIS, UNSPECIFIED: ICD-10-CM

## 2025-01-22 DIAGNOSIS — Z86.2 PERSONAL HISTORY OF DISEASES OF THE BLOOD AND BLOOD-FORMING ORGANS AND CERTAIN DISORDERS INVOLVING THE IMMUNE MECHANISM: ICD-10-CM

## 2025-01-22 DIAGNOSIS — I10 ESSENTIAL (PRIMARY) HYPERTENSION: ICD-10-CM

## 2025-01-22 DIAGNOSIS — E03.9 HYPOTHYROIDISM, UNSPECIFIED: ICD-10-CM

## 2025-01-22 DIAGNOSIS — E78.5 HYPERLIPIDEMIA, UNSPECIFIED: ICD-10-CM

## 2025-01-22 DIAGNOSIS — D22.9 MELANOCYTIC NEVI, UNSPECIFIED: ICD-10-CM

## 2025-01-22 DIAGNOSIS — Z23 ENCOUNTER FOR IMMUNIZATION: ICD-10-CM

## 2025-01-22 DIAGNOSIS — R21 RASH AND OTHER NONSPECIFIC SKIN ERUPTION: ICD-10-CM

## 2025-01-22 PROCEDURE — G0439: CPT

## 2025-01-22 PROCEDURE — 99213 OFFICE O/P EST LOW 20 MIN: CPT | Mod: 25

## 2025-01-22 PROCEDURE — G0008: CPT

## 2025-01-22 PROCEDURE — 36415 COLL VENOUS BLD VENIPUNCTURE: CPT

## 2025-01-22 PROCEDURE — 90662 IIV NO PRSV INCREASED AG IM: CPT

## 2025-01-23 LAB
CHOLEST SERPL-MCNC: 154 MG/DL
ESTIMATED AVERAGE GLUCOSE: 126 MG/DL
HBA1C MFR BLD HPLC: 6 %
HDLC SERPL-MCNC: 46 MG/DL
LDLC SERPL CALC-MCNC: 93 MG/DL
NONHDLC SERPL-MCNC: 109 MG/DL
TRIGL SERPL-MCNC: 81 MG/DL
TSH SERPL-ACNC: 2.99 UIU/ML

## 2025-02-07 ENCOUNTER — APPOINTMENT (OUTPATIENT)
Dept: ORTHOPEDIC SURGERY | Facility: CLINIC | Age: 75
End: 2025-02-07

## 2025-02-07 VITALS
BODY MASS INDEX: 23.56 KG/M2 | WEIGHT: 120 LBS | HEIGHT: 60 IN | DIASTOLIC BLOOD PRESSURE: 63 MMHG | OXYGEN SATURATION: 96 % | SYSTOLIC BLOOD PRESSURE: 109 MMHG | HEART RATE: 77 BPM

## 2025-02-07 PROCEDURE — 99213 OFFICE O/P EST LOW 20 MIN: CPT

## 2025-02-07 RX ORDER — ACETAMINOPHEN 500 MG/1
500 TABLET ORAL
Qty: 30 | Refills: 0 | Status: ACTIVE | COMMUNITY
Start: 2025-02-07 | End: 1900-01-01

## 2025-03-03 ENCOUNTER — APPOINTMENT (OUTPATIENT)
Dept: CARDIOLOGY | Facility: CLINIC | Age: 75
End: 2025-03-03
Payer: MEDICARE

## 2025-03-03 PROCEDURE — 93306 TTE W/DOPPLER COMPLETE: CPT

## 2025-03-04 DIAGNOSIS — Z86.79 PERSONAL HISTORY OF OTHER DISEASES OF THE CIRCULATORY SYSTEM: ICD-10-CM

## 2025-03-07 ENCOUNTER — APPOINTMENT (OUTPATIENT)
Dept: CARDIOLOGY | Facility: CLINIC | Age: 75
End: 2025-03-07
Payer: MEDICARE

## 2025-03-07 ENCOUNTER — NON-APPOINTMENT (OUTPATIENT)
Age: 75
End: 2025-03-07

## 2025-03-07 VITALS
OXYGEN SATURATION: 98 % | WEIGHT: 117 LBS | BODY MASS INDEX: 23.63 KG/M2 | SYSTOLIC BLOOD PRESSURE: 126 MMHG | HEART RATE: 65 BPM | DIASTOLIC BLOOD PRESSURE: 64 MMHG

## 2025-03-07 DIAGNOSIS — R60.0 LOCALIZED EDEMA: ICD-10-CM

## 2025-03-07 DIAGNOSIS — I38 ENDOCARDITIS, VALVE UNSPECIFIED: ICD-10-CM

## 2025-03-07 DIAGNOSIS — R00.2 PALPITATIONS: ICD-10-CM

## 2025-03-07 DIAGNOSIS — I10 ESSENTIAL (PRIMARY) HYPERTENSION: ICD-10-CM

## 2025-03-07 DIAGNOSIS — R06.02 SHORTNESS OF BREATH: ICD-10-CM

## 2025-03-07 DIAGNOSIS — R01.1 CARDIAC MURMUR, UNSPECIFIED: ICD-10-CM

## 2025-03-07 DIAGNOSIS — Z86.0100 PERSONAL HISTORY OF COLON POLYPS, UNSPECIFIED: ICD-10-CM

## 2025-03-07 DIAGNOSIS — I34.0 NONRHEUMATIC MITRAL (VALVE) INSUFFICIENCY: ICD-10-CM

## 2025-03-07 PROCEDURE — 99215 OFFICE O/P EST HI 40 MIN: CPT

## 2025-03-07 PROCEDURE — 93246 EXT ECG>7D<15D RECORDING: CPT

## 2025-03-07 PROCEDURE — 99205 OFFICE O/P NEW HI 60 MIN: CPT

## 2025-03-07 PROCEDURE — 93000 ELECTROCARDIOGRAM COMPLETE: CPT | Mod: 59

## 2025-03-08 PROBLEM — R60.0 PERIPHERAL EDEMA: Status: ACTIVE | Noted: 2025-03-08

## 2025-03-08 PROBLEM — I49.3 VENTRICULAR ECTOPY: Status: ACTIVE | Noted: 2025-03-07

## 2025-03-08 PROBLEM — R00.2 PALPITATIONS: Status: ACTIVE | Noted: 2025-03-08

## 2025-03-08 PROBLEM — I38 VALVULAR HEART DISEASE: Status: ACTIVE | Noted: 2025-03-08

## 2025-03-08 PROBLEM — I34.0 MITRAL REGURGITATION: Status: ACTIVE | Noted: 2025-03-08

## 2025-03-08 PROBLEM — R06.02 SHORTNESS OF BREATH: Status: ACTIVE | Noted: 2025-03-08

## 2025-03-08 PROBLEM — Z86.0100 HISTORY OF COLONIC POLYPS: Status: RESOLVED | Noted: 2025-03-08 | Resolved: 2025-03-08

## 2025-03-08 PROBLEM — R01.1 HEART MURMUR: Status: ACTIVE | Noted: 2025-03-08

## 2025-03-14 ENCOUNTER — NON-APPOINTMENT (OUTPATIENT)
Age: 75
End: 2025-03-14

## 2025-03-14 DIAGNOSIS — I49.3 VENTRICULAR PREMATURE DEPOLARIZATION: ICD-10-CM

## 2025-03-14 PROCEDURE — 93227 XTRNL ECG REC<48 HR R&I: CPT

## 2025-03-20 ENCOUNTER — APPOINTMENT (OUTPATIENT)
Dept: RHEUMATOLOGY | Facility: CLINIC | Age: 75
End: 2025-03-20
Payer: MEDICARE

## 2025-03-20 VITALS
HEIGHT: 60 IN | WEIGHT: 119 LBS | SYSTOLIC BLOOD PRESSURE: 120 MMHG | HEART RATE: 65 BPM | OXYGEN SATURATION: 98 % | DIASTOLIC BLOOD PRESSURE: 78 MMHG | BODY MASS INDEX: 23.36 KG/M2

## 2025-03-20 DIAGNOSIS — L40.50 ARTHROPATHIC PSORIASIS, UNSPECIFIED: ICD-10-CM

## 2025-03-20 DIAGNOSIS — L40.9 PSORIASIS, UNSPECIFIED: ICD-10-CM

## 2025-03-20 PROCEDURE — 99214 OFFICE O/P EST MOD 30 MIN: CPT

## 2025-03-23 LAB
ALBUMIN SERPL ELPH-MCNC: 4.5 G/DL
ALP BLD-CCNC: 101 U/L
ALT SERPL-CCNC: 19 U/L
ANION GAP SERPL CALC-SCNC: 11 MMOL/L
AST SERPL-CCNC: 25 U/L
BASOPHILS # BLD AUTO: 0.05 K/UL
BASOPHILS NFR BLD AUTO: 0.7 %
BILIRUB SERPL-MCNC: 0.4 MG/DL
BUN SERPL-MCNC: 21 MG/DL
CALCIUM SERPL-MCNC: 9.4 MG/DL
CHLORIDE SERPL-SCNC: 103 MMOL/L
CO2 SERPL-SCNC: 27 MMOL/L
CREAT SERPL-MCNC: 0.95 MG/DL
EGFRCR SERPLBLD CKD-EPI 2021: 83 ML/MIN/1.73M2
EOSINOPHIL # BLD AUTO: 0.25 K/UL
EOSINOPHIL NFR BLD AUTO: 3.4 %
GLUCOSE SERPL-MCNC: 101 MG/DL
HCT VFR BLD CALC: 42.8 %
HGB BLD-MCNC: 13.4 G/DL
IMM GRANULOCYTES NFR BLD AUTO: 0.1 %
LYMPHOCYTES # BLD AUTO: 2.9 K/UL
LYMPHOCYTES NFR BLD AUTO: 40 %
MAN DIFF?: NORMAL
MCHC RBC-ENTMCNC: 31.3 G/DL
MCHC RBC-ENTMCNC: 32.8 PG
MCV RBC AUTO: 104.6 FL
MONOCYTES # BLD AUTO: 0.51 K/UL
MONOCYTES NFR BLD AUTO: 7 %
NEUTROPHILS # BLD AUTO: 3.53 K/UL
NEUTROPHILS NFR BLD AUTO: 48.8 %
PLATELET # BLD AUTO: 197 K/UL
POTASSIUM SERPL-SCNC: 4.7 MMOL/L
PROT SERPL-MCNC: 6.6 G/DL
RBC # BLD: 4.09 M/UL
RBC # FLD: 14.2 %
SODIUM SERPL-SCNC: 142 MMOL/L
WBC # FLD AUTO: 7.25 K/UL

## 2025-04-14 ENCOUNTER — RX RENEWAL (OUTPATIENT)
Age: 75
End: 2025-04-14

## 2025-04-16 ENCOUNTER — APPOINTMENT (OUTPATIENT)
Dept: ORTHOPEDIC SURGERY | Facility: CLINIC | Age: 75
End: 2025-04-16
Payer: MEDICARE

## 2025-04-16 VITALS
OXYGEN SATURATION: 97 % | HEIGHT: 60 IN | BODY MASS INDEX: 23.36 KG/M2 | SYSTOLIC BLOOD PRESSURE: 124 MMHG | WEIGHT: 119 LBS | HEART RATE: 78 BPM | DIASTOLIC BLOOD PRESSURE: 74 MMHG

## 2025-04-16 DIAGNOSIS — M54.50 LOW BACK PAIN, UNSPECIFIED: ICD-10-CM

## 2025-04-16 PROCEDURE — 99214 OFFICE O/P EST MOD 30 MIN: CPT

## 2025-04-16 PROCEDURE — G2211 COMPLEX E/M VISIT ADD ON: CPT

## 2025-04-16 RX ORDER — ACETAMINOPHEN 500 MG/1
500 TABLET ORAL
Qty: 50 | Refills: 0 | Status: ACTIVE | COMMUNITY
Start: 2025-04-16 | End: 1900-01-01

## 2025-04-16 RX ORDER — MELOXICAM 15 MG/1
15 TABLET ORAL
Qty: 30 | Refills: 0 | Status: ACTIVE | COMMUNITY
Start: 2025-04-16 | End: 1900-01-01

## 2025-04-19 ENCOUNTER — RESULT REVIEW (OUTPATIENT)
Age: 75
End: 2025-04-19

## 2025-04-23 ENCOUNTER — APPOINTMENT (OUTPATIENT)
Dept: ORTHOPEDIC SURGERY | Facility: CLINIC | Age: 75
End: 2025-04-23
Payer: MEDICARE

## 2025-04-23 VITALS
HEIGHT: 60 IN | BODY MASS INDEX: 23.36 KG/M2 | DIASTOLIC BLOOD PRESSURE: 82 MMHG | WEIGHT: 119 LBS | SYSTOLIC BLOOD PRESSURE: 132 MMHG | OXYGEN SATURATION: 98 % | HEART RATE: 64 BPM

## 2025-04-23 DIAGNOSIS — M54.16 RADICULOPATHY, LUMBAR REGION: ICD-10-CM

## 2025-04-23 PROCEDURE — 99214 OFFICE O/P EST MOD 30 MIN: CPT

## 2025-04-23 PROCEDURE — G2211 COMPLEX E/M VISIT ADD ON: CPT

## 2025-05-14 ENCOUNTER — RX RENEWAL (OUTPATIENT)
Age: 75
End: 2025-05-14

## 2025-06-09 ENCOUNTER — APPOINTMENT (OUTPATIENT)
Dept: VASCULAR SURGERY | Facility: CLINIC | Age: 75
End: 2025-06-09

## 2025-06-10 ENCOUNTER — APPOINTMENT (OUTPATIENT)
Dept: INTERNAL MEDICINE | Facility: CLINIC | Age: 75
End: 2025-06-10
Payer: MEDICARE

## 2025-06-10 ENCOUNTER — APPOINTMENT (OUTPATIENT)
Dept: ENDOCRINOLOGY | Facility: CLINIC | Age: 75
End: 2025-06-10
Payer: MEDICARE

## 2025-06-10 VITALS — DIASTOLIC BLOOD PRESSURE: 72 MMHG | SYSTOLIC BLOOD PRESSURE: 148 MMHG

## 2025-06-10 VITALS
SYSTOLIC BLOOD PRESSURE: 160 MMHG | HEART RATE: 61 BPM | WEIGHT: 122 LBS | OXYGEN SATURATION: 97 % | HEIGHT: 60 IN | BODY MASS INDEX: 23.95 KG/M2 | DIASTOLIC BLOOD PRESSURE: 94 MMHG

## 2025-06-10 PROBLEM — J06.9 URI (UPPER RESPIRATORY INFECTION): Status: ACTIVE | Noted: 2025-06-10 | Resolved: 2025-07-10

## 2025-06-10 LAB
25(OH)D3 SERPL-MCNC: 27.6 NG/ML
ANION GAP SERPL CALC-SCNC: 13 MMOL/L
BUN SERPL-MCNC: 22 MG/DL
CALCIUM SERPL-MCNC: 9.7 MG/DL
CHLORIDE SERPL-SCNC: 104 MMOL/L
CO2 SERPL-SCNC: 25 MMOL/L
CORTIS SERPL-MCNC: 15.3 UG/DL
CREAT SERPL-MCNC: 0.92 MG/DL
EGFRCR SERPLBLD CKD-EPI 2021: 86 ML/MIN/1.73M2
GLUCOSE SERPL-MCNC: 104 MG/DL
POTASSIUM SERPL-SCNC: 4.3 MMOL/L
PSA FREE FLD-MCNC: NORMAL %
PSA FREE SERPL-MCNC: <0.01 NG/ML
PSA SERPL-MCNC: <0.01 NG/ML
SODIUM SERPL-SCNC: 142 MMOL/L
VIT B12 SERPL-MCNC: >2000 PG/ML

## 2025-06-10 PROCEDURE — G2211 COMPLEX E/M VISIT ADD ON: CPT

## 2025-06-10 PROCEDURE — 36415 COLL VENOUS BLD VENIPUNCTURE: CPT

## 2025-06-10 PROCEDURE — 99215 OFFICE O/P EST HI 40 MIN: CPT

## 2025-06-10 PROCEDURE — 99214 OFFICE O/P EST MOD 30 MIN: CPT

## 2025-06-10 RX ORDER — FLUTICASONE PROPIONATE 50 UG/1
50 SPRAY NASAL DAILY
Qty: 2 | Refills: 1 | Status: ACTIVE | COMMUNITY
Start: 2025-06-10 | End: 1900-01-01

## 2025-06-11 LAB
ACTH SER-ACNC: 22.4 PG/ML
ALDOSTERONE SERUM: 12.3 NG/DL
ESTIMATED AVERAGE GLUCOSE: 120 MG/DL
HBA1C MFR BLD HPLC: 5.8 %
RESP PATH DNA+RNA PNL NPH NAA+NON-PROBE: DETECTED
RV+EV RNA NPH QL NAA+NON-PROBE: DETECTED
SARS-COV-2 RNA RESP QL NAA+PROBE: NOT DETECTED

## 2025-06-16 LAB — RENIN ACTIVITY, PLASMA: 0.34 NG/ML/HR

## 2025-06-20 LAB
METANEPHRINE, PL: 57.7 PG/ML
NORMETANEPHRINE, PL: 55.5 PG/ML

## 2025-06-21 LAB — DHEA-SULFATE, SERUM: 27 UG/DL

## 2025-07-22 ENCOUNTER — APPOINTMENT (OUTPATIENT)
Dept: INTERNAL MEDICINE | Facility: CLINIC | Age: 75
End: 2025-07-22
Payer: MEDICARE

## 2025-07-22 VITALS
HEIGHT: 60 IN | HEART RATE: 60 BPM | RESPIRATION RATE: 16 BRPM | SYSTOLIC BLOOD PRESSURE: 134 MMHG | TEMPERATURE: 97.8 F | BODY MASS INDEX: 22.19 KG/M2 | OXYGEN SATURATION: 98 % | WEIGHT: 113 LBS | DIASTOLIC BLOOD PRESSURE: 70 MMHG

## 2025-07-22 DIAGNOSIS — I83.813 VARICOSE VEINS OF BILATERAL LOWER EXTREMITIES WITH PAIN: ICD-10-CM

## 2025-07-22 DIAGNOSIS — M54.50 LOW BACK PAIN, UNSPECIFIED: ICD-10-CM

## 2025-07-22 DIAGNOSIS — E53.8 DEFICIENCY OF OTHER SPECIFIED B GROUP VITAMINS: ICD-10-CM

## 2025-07-22 DIAGNOSIS — Z87.898 PERSONAL HISTORY OF OTHER SPECIFIED CONDITIONS: ICD-10-CM

## 2025-07-22 DIAGNOSIS — R63.4 ABNORMAL WEIGHT LOSS: ICD-10-CM

## 2025-07-22 DIAGNOSIS — Z86.39 PERSONAL HISTORY OF OTHER ENDOCRINE, NUTRITIONAL AND METABOLIC DISEASE: ICD-10-CM

## 2025-07-22 DIAGNOSIS — I10 ESSENTIAL (PRIMARY) HYPERTENSION: ICD-10-CM

## 2025-07-22 DIAGNOSIS — E03.9 HYPOTHYROIDISM, UNSPECIFIED: ICD-10-CM

## 2025-07-22 PROCEDURE — G2211 COMPLEX E/M VISIT ADD ON: CPT

## 2025-07-22 PROCEDURE — 99214 OFFICE O/P EST MOD 30 MIN: CPT

## 2025-07-22 PROCEDURE — 36415 COLL VENOUS BLD VENIPUNCTURE: CPT

## 2025-07-23 ENCOUNTER — APPOINTMENT (OUTPATIENT)
Dept: ORTHOPEDIC SURGERY | Facility: CLINIC | Age: 75
End: 2025-07-23
Payer: MEDICARE

## 2025-07-23 VITALS
OXYGEN SATURATION: 100 % | BODY MASS INDEX: 22.19 KG/M2 | SYSTOLIC BLOOD PRESSURE: 130 MMHG | DIASTOLIC BLOOD PRESSURE: 94 MMHG | HEIGHT: 60 IN | HEART RATE: 65 BPM | WEIGHT: 113 LBS

## 2025-07-23 LAB
ALBUMIN SERPL ELPH-MCNC: 4.4 G/DL
ALP BLD-CCNC: 101 U/L
ALT SERPL-CCNC: 20 U/L
ANION GAP SERPL CALC-SCNC: 14 MMOL/L
AST SERPL-CCNC: 29 U/L
BASOPHILS # BLD AUTO: 0.05 K/UL
BASOPHILS NFR BLD AUTO: 0.9 %
BILIRUB SERPL-MCNC: 0.4 MG/DL
BUN SERPL-MCNC: 20 MG/DL
CALCIUM SERPL-MCNC: 9.6 MG/DL
CHLORIDE SERPL-SCNC: 105 MMOL/L
CO2 SERPL-SCNC: 23 MMOL/L
CREAT SERPL-MCNC: 0.86 MG/DL
EGFRCR SERPLBLD CKD-EPI 2021: 90 ML/MIN/1.73M2
EOSINOPHIL # BLD AUTO: 0.22 K/UL
EOSINOPHIL NFR BLD AUTO: 4 %
GLUCOSE SERPL-MCNC: 99 MG/DL
HCT VFR BLD CALC: 41.5 %
HGB BLD-MCNC: 12.8 G/DL
IMM GRANULOCYTES NFR BLD AUTO: 0.4 %
LYMPHOCYTES # BLD AUTO: 2.56 K/UL
LYMPHOCYTES NFR BLD AUTO: 46.2 %
MAN DIFF?: NORMAL
MCHC RBC-ENTMCNC: 30.8 G/DL
MCHC RBC-ENTMCNC: 31.8 PG
MCV RBC AUTO: 103 FL
MONOCYTES # BLD AUTO: 0.32 K/UL
MONOCYTES NFR BLD AUTO: 5.8 %
NEUTROPHILS # BLD AUTO: 2.37 K/UL
NEUTROPHILS NFR BLD AUTO: 42.7 %
PLATELET # BLD AUTO: 174 K/UL
POTASSIUM SERPL-SCNC: 4.4 MMOL/L
PROT SERPL-MCNC: 6.8 G/DL
RBC # BLD: 4.03 M/UL
RBC # FLD: 14.2 %
SODIUM SERPL-SCNC: 142 MMOL/L
T4 FREE SERPL-MCNC: 1.4 NG/DL
TSH SERPL-ACNC: 2.43 UIU/ML
WBC # FLD AUTO: 5.54 K/UL

## 2025-07-23 PROCEDURE — G2211 COMPLEX E/M VISIT ADD ON: CPT

## 2025-07-23 PROCEDURE — 99214 OFFICE O/P EST MOD 30 MIN: CPT

## 2025-07-23 RX ORDER — TRAMADOL HYDROCHLORIDE 50 MG/1
50 TABLET, COATED ORAL EVERY 6 HOURS
Qty: 20 | Refills: 0 | Status: ACTIVE | COMMUNITY
Start: 2025-07-23 | End: 1900-01-01

## 2025-07-24 ENCOUNTER — APPOINTMENT (OUTPATIENT)
Dept: RHEUMATOLOGY | Facility: CLINIC | Age: 75
End: 2025-07-24
Payer: MEDICARE

## 2025-07-24 VITALS
OXYGEN SATURATION: 98 % | WEIGHT: 113 LBS | SYSTOLIC BLOOD PRESSURE: 126 MMHG | DIASTOLIC BLOOD PRESSURE: 68 MMHG | HEIGHT: 60 IN | HEART RATE: 76 BPM | BODY MASS INDEX: 22.19 KG/M2

## 2025-07-24 DIAGNOSIS — L40.50 ARTHROPATHIC PSORIASIS, UNSPECIFIED: ICD-10-CM

## 2025-07-24 DIAGNOSIS — L40.9 PSORIASIS, UNSPECIFIED: ICD-10-CM

## 2025-07-24 PROCEDURE — 99214 OFFICE O/P EST MOD 30 MIN: CPT

## 2025-07-24 PROCEDURE — G2211 COMPLEX E/M VISIT ADD ON: CPT

## 2025-07-25 ENCOUNTER — APPOINTMENT (OUTPATIENT)
Dept: PAIN MANAGEMENT | Facility: CLINIC | Age: 75
End: 2025-07-25
Payer: MEDICARE

## 2025-07-25 VITALS
BODY MASS INDEX: 22.19 KG/M2 | SYSTOLIC BLOOD PRESSURE: 136 MMHG | WEIGHT: 113 LBS | DIASTOLIC BLOOD PRESSURE: 77 MMHG | HEIGHT: 60 IN

## 2025-07-25 DIAGNOSIS — M48.062 SPINAL STENOSIS, LUMBAR REGION WITH NEUROGENIC CLAUDICATION: ICD-10-CM

## 2025-07-25 DIAGNOSIS — M25.561 PAIN IN RIGHT KNEE: ICD-10-CM

## 2025-07-25 DIAGNOSIS — M79.18 MYALGIA, OTHER SITE: ICD-10-CM

## 2025-07-25 DIAGNOSIS — M54.16 RADICULOPATHY, LUMBAR REGION: ICD-10-CM

## 2025-07-25 PROCEDURE — 99214 OFFICE O/P EST MOD 30 MIN: CPT

## 2025-07-25 PROCEDURE — G2211 COMPLEX E/M VISIT ADD ON: CPT

## 2025-08-13 ENCOUNTER — APPOINTMENT (OUTPATIENT)
Dept: INTERNAL MEDICINE | Facility: CLINIC | Age: 75
End: 2025-08-13
Payer: MEDICARE

## 2025-08-13 VITALS
WEIGHT: 113.25 LBS | DIASTOLIC BLOOD PRESSURE: 72 MMHG | SYSTOLIC BLOOD PRESSURE: 120 MMHG | BODY MASS INDEX: 22.23 KG/M2 | HEART RATE: 70 BPM | OXYGEN SATURATION: 97 % | HEIGHT: 60 IN

## 2025-08-13 DIAGNOSIS — R21 RASH AND OTHER NONSPECIFIC SKIN ERUPTION: ICD-10-CM

## 2025-08-13 PROCEDURE — G2211 COMPLEX E/M VISIT ADD ON: CPT

## 2025-08-13 PROCEDURE — 99213 OFFICE O/P EST LOW 20 MIN: CPT

## 2025-08-13 RX ORDER — LEVOTHYROXINE SODIUM 25 UG/1
25 TABLET ORAL
Qty: 90 | Refills: 3 | Status: ACTIVE | COMMUNITY
Start: 2025-08-13

## 2025-08-13 RX ORDER — HYDROCORTISONE 25 MG/G
2.5 OINTMENT TOPICAL TWICE DAILY
Qty: 1 | Refills: 1 | Status: ACTIVE | COMMUNITY
Start: 2025-08-13 | End: 1900-01-01

## 2025-08-20 ENCOUNTER — RX RENEWAL (OUTPATIENT)
Age: 75
End: 2025-08-20

## 2025-08-26 ENCOUNTER — APPOINTMENT (OUTPATIENT)
Dept: PAIN MANAGEMENT | Facility: CLINIC | Age: 75
End: 2025-08-26
Payer: MEDICARE

## 2025-08-26 VITALS
OXYGEN SATURATION: 96 % | RESPIRATION RATE: 16 BRPM | DIASTOLIC BLOOD PRESSURE: 78 MMHG | HEART RATE: 65 BPM | SYSTOLIC BLOOD PRESSURE: 160 MMHG

## 2025-08-26 DIAGNOSIS — M48.062 SPINAL STENOSIS, LUMBAR REGION WITH NEUROGENIC CLAUDICATION: ICD-10-CM

## 2025-08-26 PROCEDURE — 62323 NJX INTERLAMINAR LMBR/SAC: CPT

## 2025-08-26 RX ADMIN — IOHEXOL 0 MG/ML: 180 INJECTION INTRAVENOUS at 00:00

## 2025-08-26 RX ADMIN — LIDOCAINE HYDROCHLORIDE %: 10 INJECTION, SOLUTION INFILTRATION; PERINEURAL at 00:00

## 2025-08-26 RX ADMIN — METHYLPREDNISOLONE ACETATE 0 MG/ML: 40 INJECTION, SUSPENSION INTRA-ARTICULAR; INTRALESIONAL; INTRAMUSCULAR; SOFT TISSUE at 00:00

## 2025-08-28 ENCOUNTER — APPOINTMENT (OUTPATIENT)
Dept: VASCULAR SURGERY | Facility: CLINIC | Age: 75
End: 2025-08-28
Payer: MEDICARE

## 2025-08-28 VITALS — HEIGHT: 60 IN | WEIGHT: 115 LBS | BODY MASS INDEX: 22.58 KG/M2

## 2025-08-28 DIAGNOSIS — I83.813 VARICOSE VEINS OF BILATERAL LOWER EXTREMITIES WITH PAIN: ICD-10-CM

## 2025-08-28 PROCEDURE — 99203 OFFICE O/P NEW LOW 30 MIN: CPT

## 2025-09-10 ENCOUNTER — APPOINTMENT (OUTPATIENT)
Dept: PAIN MANAGEMENT | Facility: CLINIC | Age: 75
End: 2025-09-10
Payer: MEDICARE

## 2025-09-10 VITALS
SYSTOLIC BLOOD PRESSURE: 130 MMHG | DIASTOLIC BLOOD PRESSURE: 62 MMHG | HEIGHT: 60 IN | WEIGHT: 115 LBS | BODY MASS INDEX: 22.58 KG/M2

## 2025-09-10 DIAGNOSIS — M79.18 MYALGIA, OTHER SITE: ICD-10-CM

## 2025-09-10 DIAGNOSIS — M48.062 SPINAL STENOSIS, LUMBAR REGION WITH NEUROGENIC CLAUDICATION: ICD-10-CM

## 2025-09-10 DIAGNOSIS — M54.16 RADICULOPATHY, LUMBAR REGION: ICD-10-CM

## 2025-09-10 PROCEDURE — G2211 COMPLEX E/M VISIT ADD ON: CPT

## 2025-09-10 PROCEDURE — 99214 OFFICE O/P EST MOD 30 MIN: CPT

## 2025-09-23 PROBLEM — I45.10 RIGHT BUNDLE-BRANCH BLOCK: Status: ACTIVE | Noted: 2025-09-22

## 2025-09-23 PROBLEM — Z01.810 PREOPERATIVE CARDIOVASCULAR EXAMINATION: Status: ACTIVE | Noted: 2025-09-23

## 2025-09-23 PROBLEM — I49.3 VENTRICULAR ECTOPY: Status: ACTIVE | Noted: 2025-09-23
